# Patient Record
Sex: MALE | Race: BLACK OR AFRICAN AMERICAN | Employment: FULL TIME | URBAN - NONMETROPOLITAN AREA
[De-identification: names, ages, dates, MRNs, and addresses within clinical notes are randomized per-mention and may not be internally consistent; named-entity substitution may affect disease eponyms.]

---

## 2020-09-14 ENCOUNTER — HOSPITAL ENCOUNTER (OUTPATIENT)
Dept: CARDIAC REHAB | Age: 50
Discharge: HOME | End: 2020-09-14
Payer: COMMERCIAL

## 2020-09-14 ENCOUNTER — APPOINTMENT (OUTPATIENT)
Dept: CARDIAC REHAB | Age: 50
End: 2020-09-14

## 2020-09-14 VITALS — WEIGHT: 236.8 LBS

## 2020-09-14 PROCEDURE — 93798 PHYS/QHP OP CAR RHAB W/ECG: CPT

## 2020-09-16 ENCOUNTER — APPOINTMENT (OUTPATIENT)
Dept: CARDIAC REHAB | Age: 50
End: 2020-09-16

## 2020-09-18 ENCOUNTER — APPOINTMENT (OUTPATIENT)
Dept: CARDIAC REHAB | Age: 50
End: 2020-09-18
Payer: COMMERCIAL

## 2020-09-18 ENCOUNTER — APPOINTMENT (OUTPATIENT)
Dept: CARDIAC REHAB | Age: 50
End: 2020-09-18

## 2020-09-21 ENCOUNTER — APPOINTMENT (OUTPATIENT)
Dept: CARDIAC REHAB | Age: 50
End: 2020-09-21

## 2020-09-21 ENCOUNTER — HOSPITAL ENCOUNTER (OUTPATIENT)
Dept: CARDIAC REHAB | Age: 50
Discharge: HOME | End: 2020-09-21
Payer: COMMERCIAL

## 2020-09-21 VITALS — WEIGHT: 236.8 LBS

## 2020-09-21 PROCEDURE — 93798 PHYS/QHP OP CAR RHAB W/ECG: CPT

## 2020-09-23 ENCOUNTER — HOSPITAL ENCOUNTER (OUTPATIENT)
Dept: CARDIAC REHAB | Age: 50
Discharge: HOME | End: 2020-09-23
Payer: COMMERCIAL

## 2020-09-23 ENCOUNTER — APPOINTMENT (OUTPATIENT)
Dept: CARDIAC REHAB | Age: 50
End: 2020-09-23

## 2020-09-23 VITALS — WEIGHT: 236 LBS

## 2020-09-23 PROCEDURE — 93797 PHYS/QHP OP CAR RHAB WO ECG: CPT

## 2020-09-23 PROCEDURE — 93798 PHYS/QHP OP CAR RHAB W/ECG: CPT

## 2020-09-25 ENCOUNTER — APPOINTMENT (OUTPATIENT)
Dept: CARDIAC REHAB | Age: 50
End: 2020-09-25

## 2020-09-25 ENCOUNTER — HOSPITAL ENCOUNTER (OUTPATIENT)
Dept: CARDIAC REHAB | Age: 50
Discharge: HOME | End: 2020-09-25
Payer: COMMERCIAL

## 2020-09-25 VITALS — WEIGHT: 237.2 LBS

## 2020-09-25 PROCEDURE — 93798 PHYS/QHP OP CAR RHAB W/ECG: CPT

## 2020-09-25 NOTE — CARDIO/PULMONARY
Malik Prasad Completed phase II cardiac rehab and attended 36 sessions from 6/16/20- 9//25/2020. Malik Prasad is interested in maintaining optimal health and will work with Jose Luis Cagle MD. Malik Prasad has improved his endurance and stamina through regular exercise. Blood pressure is WNL. He/She has also improved his/her nutrition, [employer] and depression scores and these were reviewed with patient. Malik Prasad plans to continue exercising (at home, gym, etc) and has set revised goals that include walking 50 minutes twice daily. Christophe Manzanares RN 
9/25/2020

## 2020-09-28 ENCOUNTER — APPOINTMENT (OUTPATIENT)
Dept: CARDIAC REHAB | Age: 50
End: 2020-09-28
Payer: COMMERCIAL

## 2020-09-28 ENCOUNTER — APPOINTMENT (OUTPATIENT)
Dept: CARDIAC REHAB | Age: 50
End: 2020-09-28

## 2020-09-30 ENCOUNTER — APPOINTMENT (OUTPATIENT)
Dept: CARDIAC REHAB | Age: 50
End: 2020-09-30
Payer: COMMERCIAL

## 2020-09-30 ENCOUNTER — APPOINTMENT (OUTPATIENT)
Dept: CARDIAC REHAB | Age: 50
End: 2020-09-30

## 2020-10-02 ENCOUNTER — APPOINTMENT (OUTPATIENT)
Dept: CARDIAC REHAB | Age: 50
End: 2020-10-02

## 2020-10-05 ENCOUNTER — APPOINTMENT (OUTPATIENT)
Dept: CARDIAC REHAB | Age: 50
End: 2020-10-05

## 2020-10-07 ENCOUNTER — APPOINTMENT (OUTPATIENT)
Dept: CARDIAC REHAB | Age: 50
End: 2020-10-07

## 2020-10-09 ENCOUNTER — APPOINTMENT (OUTPATIENT)
Dept: CARDIAC REHAB | Age: 50
End: 2020-10-09

## 2020-10-12 ENCOUNTER — APPOINTMENT (OUTPATIENT)
Dept: CARDIAC REHAB | Age: 50
End: 2020-10-12

## 2020-10-14 ENCOUNTER — APPOINTMENT (OUTPATIENT)
Dept: CARDIAC REHAB | Age: 50
End: 2020-10-14

## 2020-10-16 ENCOUNTER — APPOINTMENT (OUTPATIENT)
Dept: CARDIAC REHAB | Age: 50
End: 2020-10-16

## 2020-10-19 ENCOUNTER — APPOINTMENT (OUTPATIENT)
Dept: CARDIAC REHAB | Age: 50
End: 2020-10-19

## 2020-10-21 ENCOUNTER — APPOINTMENT (OUTPATIENT)
Dept: CARDIAC REHAB | Age: 50
End: 2020-10-21

## 2020-10-23 ENCOUNTER — APPOINTMENT (OUTPATIENT)
Dept: CARDIAC REHAB | Age: 50
End: 2020-10-23

## 2020-10-26 ENCOUNTER — APPOINTMENT (OUTPATIENT)
Dept: CARDIAC REHAB | Age: 50
End: 2020-10-26

## 2020-10-28 ENCOUNTER — APPOINTMENT (OUTPATIENT)
Dept: CARDIAC REHAB | Age: 50
End: 2020-10-28

## 2020-10-30 ENCOUNTER — APPOINTMENT (OUTPATIENT)
Dept: CARDIAC REHAB | Age: 50
End: 2020-10-30

## 2020-11-04 ENCOUNTER — APPOINTMENT (OUTPATIENT)
Dept: CARDIAC REHAB | Age: 50
End: 2020-11-04

## 2020-11-11 ENCOUNTER — APPOINTMENT (OUTPATIENT)
Dept: CARDIAC REHAB | Age: 50
End: 2020-11-11

## 2021-03-16 PROBLEM — I10 HYPERTENSION: Status: ACTIVE | Noted: 2020-01-29

## 2021-03-16 PROBLEM — I50.9 CONGESTIVE HEART FAILURE (HCC): Status: ACTIVE | Noted: 2020-03-25

## 2021-03-16 PROBLEM — M48.00 SPINAL STENOSIS: Status: ACTIVE | Noted: 2020-01-29

## 2021-03-16 PROBLEM — N40.0 BENIGN PROSTATIC HYPERPLASIA: Status: ACTIVE | Noted: 2021-03-16

## 2021-03-16 PROBLEM — N41.9 PROSTATITIS: Status: ACTIVE | Noted: 2021-03-16

## 2021-03-16 PROBLEM — K46.9 ABDOMINAL HERNIA: Status: ACTIVE | Noted: 2020-01-29

## 2021-03-16 PROBLEM — I25.10 CORONARY ARTERIOSCLEROSIS: Status: ACTIVE | Noted: 2020-03-25

## 2021-03-16 PROBLEM — E66.9 OBESITY: Status: ACTIVE | Noted: 2021-03-16

## 2021-03-16 PROBLEM — I25.5 GENERALIZED ISCHEMIC MYOCARDIAL DYSFUNCTION: Status: ACTIVE | Noted: 2021-03-16

## 2021-03-16 PROBLEM — E11.9 DIABETES MELLITUS (HCC): Status: ACTIVE | Noted: 2021-03-16

## 2021-03-16 PROBLEM — Q61.3 CONGENITAL POLYCYSTIC KIDNEY: Status: ACTIVE | Noted: 2021-03-16

## 2021-03-16 PROBLEM — N28.9 KIDNEY DISORDER: Status: ACTIVE | Noted: 2020-01-29

## 2021-03-19 ENCOUNTER — OFFICE VISIT (OUTPATIENT)
Dept: PRIMARY CARE CLINIC | Age: 51
End: 2021-03-19
Payer: COMMERCIAL

## 2021-03-19 DIAGNOSIS — N40.0 BENIGN PROSTATIC HYPERPLASIA WITHOUT LOWER URINARY TRACT SYMPTOMS: ICD-10-CM

## 2021-03-19 DIAGNOSIS — I50.22 CHRONIC SYSTOLIC CONGESTIVE HEART FAILURE (HCC): Primary | ICD-10-CM

## 2021-03-19 DIAGNOSIS — I10 ESSENTIAL HYPERTENSION: ICD-10-CM

## 2021-03-19 DIAGNOSIS — R73.03 PREDIABETES: ICD-10-CM

## 2021-03-19 DIAGNOSIS — I25.10 CAD, MULTIPLE VESSEL: ICD-10-CM

## 2021-03-19 DIAGNOSIS — M48.00 SPINAL STENOSIS, UNSPECIFIED SPINAL REGION: ICD-10-CM

## 2021-03-19 DIAGNOSIS — N18.32 STAGE 3B CHRONIC KIDNEY DISEASE (HCC): ICD-10-CM

## 2021-03-19 DIAGNOSIS — E78.2 MIXED HYPERLIPIDEMIA: ICD-10-CM

## 2021-03-19 DIAGNOSIS — E66.09 CLASS 1 OBESITY DUE TO EXCESS CALORIES WITH SERIOUS COMORBIDITY AND BODY MASS INDEX (BMI) OF 31.0 TO 31.9 IN ADULT: ICD-10-CM

## 2021-03-19 PROCEDURE — 99204 OFFICE O/P NEW MOD 45 MIN: CPT | Performed by: NURSE PRACTITIONER

## 2021-03-19 RX ORDER — CARVEDILOL 25 MG/1
1 TABLET ORAL 2 TIMES DAILY
COMMUNITY
Start: 2021-03-09 | End: 2021-11-16 | Stop reason: ALTCHOICE

## 2021-03-19 RX ORDER — HYDRALAZINE HYDROCHLORIDE 10 MG/1
20 TABLET, FILM COATED ORAL 3 TIMES DAILY
COMMUNITY
Start: 2021-01-19 | End: 2022-05-04 | Stop reason: SDUPTHER

## 2021-03-19 RX ORDER — TAMSULOSIN HYDROCHLORIDE 0.4 MG/1
CAPSULE ORAL
COMMUNITY
Start: 2021-02-28

## 2021-03-19 RX ORDER — HYDROCODONE BITARTRATE AND ACETAMINOPHEN 5; 325 MG/1; MG/1
TABLET ORAL
COMMUNITY
Start: 2021-02-19

## 2021-03-19 RX ORDER — ISOSORBIDE MONONITRATE 30 MG/1
TABLET, EXTENDED RELEASE ORAL
COMMUNITY
Start: 2021-01-19

## 2021-03-19 RX ORDER — CARVEDILOL 12.5 MG/1
TABLET ORAL
COMMUNITY
Start: 2020-12-23 | End: 2021-03-19 | Stop reason: SDUPTHER

## 2021-03-19 RX ORDER — NITROGLYCERIN 0.4 MG/1
TABLET SUBLINGUAL
COMMUNITY
Start: 2021-01-10

## 2021-03-19 RX ORDER — ATORVASTATIN CALCIUM 20 MG/1
TABLET, FILM COATED ORAL
COMMUNITY
Start: 2021-02-12 | End: 2022-04-09

## 2021-03-19 RX ORDER — POTASSIUM CHLORIDE 750 MG/1
20 TABLET, FILM COATED, EXTENDED RELEASE ORAL
COMMUNITY
Start: 2021-02-13 | End: 2022-07-24 | Stop reason: ALTCHOICE

## 2021-03-19 RX ORDER — EZETIMIBE 10 MG/1
TABLET ORAL
COMMUNITY
Start: 2021-01-20

## 2021-03-19 RX ORDER — CARVEDILOL 12.5 MG/1
TABLET ORAL
Qty: 180 TAB | Refills: 0 | Status: SHIPPED | OUTPATIENT
Start: 2021-03-19 | End: 2021-12-16 | Stop reason: ALTCHOICE

## 2021-03-19 RX ORDER — PRASUGREL 10 MG/1
TABLET, FILM COATED ORAL
COMMUNITY
Start: 2021-01-16

## 2021-03-19 RX ORDER — ASPIRIN 81 MG/1
TABLET ORAL
COMMUNITY
Start: 2021-02-12

## 2021-03-19 RX ORDER — FUROSEMIDE 40 MG/1
80 TABLET ORAL DAILY
COMMUNITY
Start: 2021-01-08 | End: 2021-11-29 | Stop reason: ALTCHOICE

## 2021-03-19 RX ORDER — FUROSEMIDE 20 MG/1
TABLET ORAL
COMMUNITY
Start: 2021-01-08 | End: 2021-11-16

## 2021-03-19 NOTE — PROGRESS NOTES
1. Have you been to the ER, urgent care clinic since your last visit? Hospitalized since your last visit? No    2. Have you seen or consulted any other health care providers outside of the [address] [place] [address] since your last visit? Include any pap smears or colon screening.  No    Chief Complaint   Patient presents with    Establish Care     Needs PCP, history of heart attack, hypertension

## 2021-03-19 NOTE — PROGRESS NOTES
Tamiko Bruner is a 52 y.o. male who presents to the office today for the following:    Chief Complaint   Patient presents with   Ottoniel Loser Establish Care     Needs PCP, history of heart attack, hypertension    Hypertension    Cholesterol Problem    Pain (Chronic)       Past Medical History:   Diagnosis Date    Abdominal hernia 1/29/2020    Benign prostatic hyperplasia 3/16/2021    Congenital polycystic kidney 3/16/2021    Congestive heart failure ([facility] [address] 3/25/2020    Coronary arteriosclerosis 3/25/2020    Heart attack ([facility] [address] 3/18/2020 and 06/30/2020    History of COVID-19 3/28/2021    Hypertension     Prediabetes 3/28/2021    Spinal stenosis        Past Surgical History:   Procedure Laterality Date    HX HERNIA REPAIR      [place] CARDIAC SURG PROCEDURE UNLIST  06/30/2020    Stent placement        History reviewed. No pertinent family history. Social History     Tobacco Use    Smoking status: Never Smoker    Smokeless tobacco: Never Used   Substance Use Topics    Alcohol use: Not Currently    Drug use: Never        HPI  Patient here today to establish care with PMH of CAD w/ stents, hyperlipidemia, hypertension, CHF, BPH, prediabetes, CKD, covid 19,chronic back pain and obesity. Reports compliance with medications that are noted in chart. States that he is followed by pain specialist in [place] as well as nephrologist and cardiologist at [facility]. Has not had a primary care due to seeing multiple specialist. No specific concerns or complaints today. Current Outpatient Medications on File Prior to Visit   Medication Sig    aspirin delayed-release 81 mg tablet TAKE 1 TABLET BY MOUTH DAILY    atorvastatin (LIPITOR) 20 mg tablet TAKE 1 TABLET BY MOUTH DAILY    carvediloL (COREG) 25 mg tablet Take 1 Tab by mouth two (2) times a day.     ezetimibe (ZETIA) 10 mg tablet TAKE 1 TABLET BY MOUTH DAILY    furosemide (LASIX) 20 mg tablet TAKE 1 TABLET BY MOUTH DAILY    furosemide (LASIX) 40 mg tablet TAKE 2 TABLETS BY MOUTH TWICE DAILY    hydrALAZINE (APRESOLINE) 10 mg tablet TAKE 2 TABLETS BY MOUTH EVERY 8 HOURS    isosorbide mononitrate ER (IMDUR) 30 mg tablet TAKE 1 TABLET BY MOUTH DAILY    potassium chloride SR (KLOR-CON 10) 10 mEq tablet TAKE 2 TABLETS BY MOUTH TWICE DAILY    prasugreL (EFFIENT) 10 mg tablet TAKE 1 TABLET BY MOUTH DAILY    tamsulosin (FLOMAX) 0.4 mg capsule TAKE 1 CAPSULE BY MOUTH DAILY    HYDROcodone-acetaminophen (NORCO) 5-325 mg per tablet TAKE 1 TABLET BY MOUTH TWICE DAILY    nitroglycerin (NITROSTAT) 0.4 mg SL tablet ONE TABLET UNDER TONGUE AS NEEDED FOR CHEST PAIN     No current facility-administered medications on file prior to visit. Medications Ordered Today   Medications    carvediloL (COREG) 12.5 mg tablet     Sig: TAKE 1 TABLET BY MOUTH TWICE DAILY     Dispense:  180 Tab     Refill:  0        Review of Systems   Constitutional: Negative. Eyes: Negative. Respiratory: Positive for shortness of breath. Negative for cough, hemoptysis, sputum production and wheezing. Cardiovascular: Positive for leg swelling. Negative for chest pain, palpitations, claudication and PND. Gastrointestinal: Negative. Genitourinary: Negative. Musculoskeletal: Positive for back pain and myalgias. Negative for falls. Skin: Negative. Neurological: Negative. Psychiatric/Behavioral: Negative. Visit Vitals  /89 (BP 1 Location: Left upper arm, BP Patient Position: Sitting)   Pulse 94   Temp 97.7 °F (36.5 °C) (Temporal)   Resp 18   Ht 6' 3\" (1.905 m)   Wt 249 lb 2 oz (113 kg)   SpO2 97%   BMI 31.14 kg/m²       Physical Exam  Vitals signs and nursing note reviewed. Constitutional:       Appearance: Normal appearance. He is obese. Eyes:      Pupils: Pupils are equal, round, and reactive to light. Neck:      Vascular: No carotid bruit. Cardiovascular:      Rate and Rhythm: Normal rate and regular rhythm. Pulses: Normal pulses.    Pulmonary:      Effort: Pulmonary effort is normal.      Breath sounds: Normal breath sounds. Abdominal:      General: Bowel sounds are normal.      Palpations: Abdomen is soft. Tenderness: There is no abdominal tenderness. Musculoskeletal: Normal range of motion. Right lower leg: Edema (trace) present. Left lower leg: Edema (trace) present. Lymphadenopathy:      Cervical: No cervical adenopathy. Skin:     General: Skin is warm and dry. Neurological:      Mental Status: He is alert and oriented to person, place, and time. Mental status is at baseline. Psychiatric:         Mood and Affect: Mood normal.         Behavior: Behavior normal.            1. Chronic systolic congestive heart failure [facility])  Patient reports EF 35% in 12/2020  Follows with Dr. Penelope Franz at [facility] cardiology and will request records  - carvediloL (COREG) 12.5 mg tablet; TAKE 1 TABLET BY MOUTH TWICE DAILY  Dispense: 180 Tab; Refill: 0    2. CAD, multiple vessel  Patient has h/o MI and 3 stents placed in 6/2020 per patient   Follows with Dr. Michelle Yoder at [facility] cardiology and will request records    3. Essential hypertension  Blood pressure is controlled and continue medication as directed  Monitor at home and notify provider if > 140/90    4. Benign prostatic hyperplasia without lower urinary tract symptoms  Symptoms reported as stable    5. Prediabetes  Do not have any recent A1c and patient declines having this done today  Has controlled with diet     6. Stage 3b chronic kidney disease  Recent labs available per patient phone shows  CR 2.74 from 12/2020  Follows with Dr. David Gonzales at [facility] and will request last consult notes and labs    7. Spinal stenosis, unspecified spinal region  Symptoms reported as stable and is on chronic pain medication  Follows with pain specialist in [place]    8. Class 1 obesity due to excess calories with serious comorbidity and body mass index (BMI) of 31.0 to 31.9 in adult  Continue to encourage weight loss.  Recommend decreasing excess fat, salt and sugar in diet along with getting regular exercise 3-5 times weekly     9. Mixed hyperlipidemia  Last LDL per patient phone records was 78 in 12/2020 and continues on statin medication as directed            Patient verbalizes understanding of plan of care as discussed above    Follow-up and Dispositions    · Return in about 3 months (around 6/19/2021) for or sooner for worsening symptoms.

## 2021-03-28 VITALS
OXYGEN SATURATION: 97 % | TEMPERATURE: 97.7 F | DIASTOLIC BLOOD PRESSURE: 89 MMHG | SYSTOLIC BLOOD PRESSURE: 139 MMHG | RESPIRATION RATE: 18 BRPM | HEART RATE: 94 BPM | HEIGHT: 75 IN | WEIGHT: 249.13 LBS | BODY MASS INDEX: 30.98 KG/M2

## 2021-03-28 PROBLEM — N40.0 BENIGN PROSTATIC HYPERPLASIA: Status: RESOLVED | Noted: 2021-03-16 | Resolved: 2021-03-28

## 2021-03-28 PROBLEM — Z86.16 HISTORY OF COVID-19: Status: ACTIVE | Noted: 2021-03-28

## 2021-03-28 PROBLEM — R73.03 PREDIABETES: Status: ACTIVE | Noted: 2021-03-28

## 2021-03-28 PROBLEM — E11.9 DIABETES MELLITUS (HCC): Status: RESOLVED | Noted: 2021-03-16 | Resolved: 2021-03-28

## 2021-03-28 PROBLEM — Z86.16 HISTORY OF COVID-19: Status: RESOLVED | Noted: 2021-03-28 | Resolved: 2021-03-28

## 2021-09-21 ENCOUNTER — OFFICE VISIT (OUTPATIENT)
Dept: PRIMARY CARE CLINIC | Age: 51
End: 2021-09-21
Payer: COMMERCIAL

## 2021-09-21 VITALS
OXYGEN SATURATION: 98 % | BODY MASS INDEX: 30.7 KG/M2 | TEMPERATURE: 97.9 F | DIASTOLIC BLOOD PRESSURE: 83 MMHG | HEART RATE: 90 BPM | SYSTOLIC BLOOD PRESSURE: 129 MMHG | RESPIRATION RATE: 18 BRPM | WEIGHT: 245.6 LBS

## 2021-09-21 DIAGNOSIS — N48.1 BALANITIS: ICD-10-CM

## 2021-09-21 DIAGNOSIS — M48.00 SPINAL STENOSIS, UNSPECIFIED SPINAL REGION: ICD-10-CM

## 2021-09-21 DIAGNOSIS — E78.2 MIXED HYPERLIPIDEMIA: ICD-10-CM

## 2021-09-21 DIAGNOSIS — I10 ESSENTIAL HYPERTENSION: ICD-10-CM

## 2021-09-21 DIAGNOSIS — I50.22 CHRONIC SYSTOLIC CONGESTIVE HEART FAILURE (HCC): Primary | ICD-10-CM

## 2021-09-21 DIAGNOSIS — E66.09 CLASS 1 OBESITY DUE TO EXCESS CALORIES WITH SERIOUS COMORBIDITY AND BODY MASS INDEX (BMI) OF 31.0 TO 31.9 IN ADULT: ICD-10-CM

## 2021-09-21 DIAGNOSIS — R73.03 PREDIABETES: ICD-10-CM

## 2021-09-21 DIAGNOSIS — N18.32 STAGE 3B CHRONIC KIDNEY DISEASE (HCC): ICD-10-CM

## 2021-09-21 DIAGNOSIS — N40.0 BENIGN PROSTATIC HYPERPLASIA WITHOUT LOWER URINARY TRACT SYMPTOMS: ICD-10-CM

## 2021-09-21 DIAGNOSIS — K40.90 LEFT INGUINAL HERNIA: ICD-10-CM

## 2021-09-21 DIAGNOSIS — I25.10 CAD, MULTIPLE VESSEL: ICD-10-CM

## 2021-09-21 PROCEDURE — 99214 OFFICE O/P EST MOD 30 MIN: CPT | Performed by: NURSE PRACTITIONER

## 2021-09-21 RX ORDER — CHLORPHENIRAMINE MALEATE 4 MG
TABLET ORAL 2 TIMES DAILY
Qty: 30 G | Refills: 0 | Status: SHIPPED | OUTPATIENT
Start: 2021-09-21 | End: 2021-12-16 | Stop reason: ALTCHOICE

## 2021-09-21 RX ORDER — HYDROCORTISONE 1 %
CREAM (GRAM) TOPICAL 2 TIMES DAILY
Qty: 30 G | Refills: 0 | Status: SHIPPED | OUTPATIENT
Start: 2021-09-21 | End: 2021-12-16 | Stop reason: ALTCHOICE

## 2021-09-21 NOTE — PROGRESS NOTES
Colin Stewart is a 54 y.o. male who presents to the office today for the following:    Chief Complaint   Patient presents with    Hypertension       Past Medical History:   Diagnosis Date    Abdominal hernia 1/29/2020    Benign prostatic hyperplasia 3/16/2021    Congenital polycystic kidney 3/16/2021    Congestive heart failure ([facility] [address] 3/25/2020    Coronary arteriosclerosis 3/25/2020    Heart attack ([facility] [address] 3/18/2020 and 06/30/2020    History of COVID-19 3/28/2021    Hypertension     Prediabetes 3/28/2021    Spinal stenosis        Past Surgical History:   Procedure Laterality Date    HX HERNIA REPAIR      [place] CARDIAC SURG PROCEDURE UNLIST  06/30/2020    Stent placement        History reviewed. No pertinent family history. Social History     Tobacco Use    Smoking status: Never Smoker    Smokeless tobacco: Never Used   Vaping Use    Vaping Use: Never used   Substance Use Topics    Alcohol use: Not Currently    Drug use: Never      HPI  Patient here today to establish care with PMH of CAD w/ stents, hyperlipidemia, hypertension, CHF, BPH, prediabetes, CKD, covid 19,chronic back pain and obesity. Reports compliance with medications that are noted in chart. States that he is followed by pain specialist in [place] as well as nephrologist and cardiologist at [facility]. He reports that for the past several weeks has had white discharge and irritation to penis. States that he has had similar problem in the past but usually clears up on its own. Also wants to discuss having hernia repair done in left groin. Was supposed to have this done prior to have last stent placed but surgery had to be put on hold. Is uncomfortable at times but is reproducible.       Current Outpatient Medications on File Prior to Visit   Medication Sig    aspirin delayed-release 81 mg tablet TAKE 1 TABLET BY MOUTH DAILY    atorvastatin (LIPITOR) 20 mg tablet TAKE 1 TABLET BY MOUTH DAILY    carvediloL (COREG) 25 mg tablet Take 1 Tab by mouth two (2) times a day.  ezetimibe (ZETIA) 10 mg tablet TAKE 1 TABLET BY MOUTH DAILY    furosemide (LASIX) 20 mg tablet TAKE 1 TABLET BY MOUTH DAILY    furosemide (LASIX) 40 mg tablet TAKE 2 TABLETS BY MOUTH TWICE DAILY    hydrALAZINE (APRESOLINE) 10 mg tablet TAKE 2 TABLETS BY MOUTH EVERY 8 HOURS    isosorbide mononitrate ER (IMDUR) 30 mg tablet TAKE 1 TABLET BY MOUTH DAILY    potassium chloride SR (KLOR-CON 10) 10 mEq tablet TAKE 2 TABLETS BY MOUTH TWICE DAILY    prasugreL (EFFIENT) 10 mg tablet TAKE 1 TABLET BY MOUTH DAILY    tamsulosin (FLOMAX) 0.4 mg capsule TAKE 1 CAPSULE BY MOUTH DAILY    HYDROcodone-acetaminophen (NORCO) 5-325 mg per tablet Dr Bryson Lee nitroglycerin (NITROSTAT) 0.4 mg SL tablet ONE TABLET UNDER TONGUE AS NEEDED FOR CHEST PAIN    carvediloL (COREG) 12.5 mg tablet TAKE 1 TABLET BY MOUTH TWICE DAILY     No current facility-administered medications on file prior to visit. Medications Ordered Today   Medications    clotrimazole (LOTRIMIN) 1 % topical cream     Sig: Apply  to affected area two (2) times a day. Dispense:  30 g     Refill:  0    hydrocortisone (CORTAID) 1 % topical cream     Sig: Apply  to affected area two (2) times a day. use thin layer     Dispense:  30 g     Refill:  0        Review of Systems   Constitutional: Negative. Eyes: Negative. Respiratory: Positive for shortness of breath. Negative for cough, hemoptysis, sputum production and wheezing. Cardiovascular: Positive for leg swelling. Negative for chest pain, palpitations, claudication and PND. Gastrointestinal: Negative. Genitourinary: Negative. Negative for dysuria, frequency and urgency. Discharge and rash to penis   Musculoskeletal: Positive for back pain and myalgias. Negative for falls. Neurological: Negative. Psychiatric/Behavioral: Negative.         Visit Vitals  /83 (BP 1 Location: Left upper arm, BP Patient Position: Sitting, BP Cuff Size: Adult)   Pulse 90   Temp 97.9 °F (36.6 °C) (Temporal)   Resp 18   Wt 245 lb 9.6 oz (111.4 kg)   SpO2 98%   BMI 30.70 kg/m²       Physical Exam  Vitals and nursing note reviewed. Exam conducted with a chaperone present. Constitutional:       Appearance: Normal appearance. He is obese. Eyes:      Pupils: Pupils are equal, round, and reactive to light. Neck:      Vascular: No carotid bruit. Cardiovascular:      Rate and Rhythm: Normal rate and regular rhythm. Pulses: Normal pulses. Pulmonary:      Effort: Pulmonary effort is normal.      Breath sounds: Normal breath sounds. Abdominal:      General: Bowel sounds are normal.      Palpations: Abdomen is soft. Tenderness: There is no abdominal tenderness. Hernia: A hernia (left inguinal extending into scrotum) is present. Genitourinary:     Penis: Uncircumcised. Erythema (mild erythema) and discharge present. Comments: Foreskin retracts  Musculoskeletal:         General: Normal range of motion. Right lower leg: Edema (trace) present. Left lower leg: Edema (trace) present. Lymphadenopathy:      Cervical: No cervical adenopathy. Skin:     General: Skin is warm and dry. Neurological:      Mental Status: He is alert and oriented to person, place, and time. Mental status is at baseline. Psychiatric:         Mood and Affect: Mood normal.         Behavior: Behavior normal.            1. Chronic systolic congestive heart failure (HCC)   EF 25-35%   Follows with Dr. Sabi Sparks at [facility] cardiology and reviewed most recent note from 2/2021  Continue care per cardiologist and next appointment 10/28/21      2. CAD, multiple vessel  Patient has h/o MI and 3 stents with last placed in 6/2020  Follows with Dr. Russell Mota at [facility] cardiology and reviewed most recent note from 2/2021  Continue care per cardiologist and next appointment 10/28/21    3.  Essential hypertension  Blood pressure is controlled and continue medication as directed  Monitor at home and notify provider if > 140/90    4. Benign prostatic hyperplasia without lower urinary tract symptoms  Symptoms reported as stable    5. Prediabetes  Do not have any recent A1c but will check today  Has controlled with diet     6. Stage 3b chronic kidney disease  No recent labs available but will request   Follows with Dr. Daniela Leigh at [facility]     7. Spinal stenosis, unspecified spinal region  Symptoms reported as stable and is on chronic pain medication  Follows with pain specialist in [place]    8. Class 1 obesity due to excess calories with serious comorbidity and body mass index (BMI) of 31.0 to 31.9 in adult  Continue to encourage weight loss. Recommend decreasing excess fat, salt and sugar in diet along with getting regular exercise 3-5 times weekly     9. Mixed hyperlipidemia  Last LDL per patient phone records was 78 in 12/2020 and continues on statin medication as directed    10. Left inguinal hernia  This has been present and was supposed to have repair in 7/2020 but had stents placed prior to getting procedure and could not proceed  He does report intermittent discomfort but no change in size   Wants to seek possible intervention now but will need to discuss with cardiologist  Will get him set up to see Dr. Robert Carreon    11. Balanitis  Treat with topical lotrimin and hydrocortisone as directed  Re-evaluate in 1 week or sooner for any worsening symptoms  - URINALYSIS W/ RFLX MICROSCOPIC  - CULTURE, URINE  - clotrimazole (LOTRIMIN) 1 % topical cream; Apply  to affected area two (2) times a day. Dispense: 30 g; Refill: 0  - hydrocortisone (CORTAID) 1 % topical cream; Apply  to affected area two (2) times a day. use thin layer  Dispense: 30 g; Refill: 0      Patient verbalizes understanding of plan of care as discussed above    Follow-up and Dispositions    · Return in about 1 week (around 9/28/2021) for or sooner for worsening symptoms.

## 2021-09-21 NOTE — PROGRESS NOTES
Chief Complaint   Patient presents with    Hypertension     Pt did not bring meds but went over with pt and wife stated everything was correct

## 2021-09-23 LAB
APPEARANCE UR: CLEAR
BACTERIA #/AREA URNS HPF: NORMAL /[HPF]
BACTERIA UR CULT: NORMAL
BILIRUB UR QL STRIP: NEGATIVE
CASTS URNS QL MICRO: NORMAL /LPF
COLOR UR: YELLOW
EPI CELLS #/AREA URNS HPF: NORMAL /HPF (ref 0–10)
GLUCOSE UR QL: ABNORMAL
HGB UR QL STRIP: ABNORMAL
KETONES UR QL STRIP: NEGATIVE
LEUKOCYTE ESTERASE UR QL STRIP: NEGATIVE
MICRO URNS: ABNORMAL
NITRITE UR QL STRIP: NEGATIVE
PH UR STRIP: 6.5 [PH] (ref 5–7.5)
PROT UR QL STRIP: ABNORMAL
RBC #/AREA URNS HPF: NORMAL /HPF (ref 0–2)
SP GR UR: 1.01 (ref 1–1.03)
UROBILINOGEN UR STRIP-MCNC: 0.2 MG/DL (ref 0.2–1)
WBC #/AREA URNS HPF: NORMAL /HPF (ref 0–5)

## 2021-09-30 ENCOUNTER — OFFICE VISIT (OUTPATIENT)
Dept: SURGERY | Age: 51
End: 2021-09-30
Payer: COMMERCIAL

## 2021-09-30 VITALS
WEIGHT: 245 LBS | OXYGEN SATURATION: 98 % | TEMPERATURE: 98 F | BODY MASS INDEX: 30.46 KG/M2 | DIASTOLIC BLOOD PRESSURE: 90 MMHG | RESPIRATION RATE: 14 BRPM | SYSTOLIC BLOOD PRESSURE: 140 MMHG | HEIGHT: 75 IN | HEART RATE: 85 BPM

## 2021-09-30 DIAGNOSIS — K40.90 NON-RECURRENT UNILATERAL INGUINAL HERNIA WITHOUT OBSTRUCTION OR GANGRENE: Primary | ICD-10-CM

## 2021-09-30 PROCEDURE — 99204 OFFICE O/P NEW MOD 45 MIN: CPT | Performed by: COLON & RECTAL SURGERY

## 2021-09-30 NOTE — PROGRESS NOTES
OFFICE VISIT NOTE    Kian Morales is a 54 y.o. male who presents to the office today for:    Chief Complaint   Patient presents with    New Patient    Inguinal Hernia     L inguinal hernia       Ms. Ruben Romero is a very pleasant 14-year-old -American gentleman who was referred for evaluation and surgical management of a left inguinal hernia. He states that the hernia has been present for quite some time and is actually scheduled to have surgery July 2020 however he had an MI and required a cardiac catheterization with stent placement June 2020. He does have a fairly extensive cardiac history. He has a history of hypertension, ischemic myocardial dysfunction, congestive heart failure. He is followed by Dr. Ángel Gonzalez at [facility]. In terms of his hernia he reports discomfort at the site denies any obstructive symptoms. He states he is able to reduce the hernia manually but does not spontaneously reduce. His health history is otherwise significant for history of prostatitis, BPH, congenital polycystic kidney, obesity.       Past Medical History:   Diagnosis Date    Abdominal hernia 1/29/2020    Benign prostatic hyperplasia 3/16/2021    Congenital polycystic kidney 3/16/2021    Congestive heart failure ([address] 3/25/2020    Coronary arteriosclerosis 3/25/2020    Heart attack ([facility] [address] 3/18/2020 and 06/30/2020    History of COVID-19 3/28/2021    Hypertension     Inguinal hernia     Prediabetes 3/28/2021    Spinal stenosis        Past Surgical History:   Procedure Laterality Date    HX HERNIA REPAIR  [address]    um[address] hernia    [place] CARDIAC SURG PROCEDURE UNLIST  06/30/2020    Stent placement       Family History   Problem Relation Age of Onset    Hypertension Mother     Diabetes Mother     Kidney Disease Mother     Heart Disease Father        Social History Socioeconomic History    Marital status:      Spouse name: Not on file    Number of children: Not on file    Years of education: Not on file    Highest education level: Not on file   Occupational History    Not on file   Tobacco Use    Smoking status: Never Smoker    Smokeless tobacco: Never Used   Vaping Use    Vaping Use: Never used   Substance and Sexual Activity    Alcohol use: Not Currently    Drug use: Never    Sexual activity: Yes     Partners: Female   Other Topics Concern    Not on file   Social History Narrative    Not on file     Social Determinants of Health     Financial Resource Strain:     Difficulty of Paying Living Expenses:    Food Insecurity:     Worried About Running Out of Food in the Last Year:     [address] in the Last Year:    Transportation Needs:     Lack of Transportation (Medical):  Lack of Transportation (Non-Medical):    Physical Activity:     Days of Exercise per Week:     Minutes of Exercise per Session:    Stress:     Feeling of Stress :    Social Connections:     Frequency of Communication with Friends and Family:     Frequency of Social Gatherings with Friends and Family:     Attends Taoist Services:     Active Member of Clubs or Organizations:     Attends Club or Organization Meetings:     Marital Status:    Intimate Partner Violence:     Fear of Current or Ex-Partner:     Emotionally Abused:     Physically Abused:     Sexually Abused: Allergies   Allergen Reactions    Adhesive Tape-Silicones Rash     Paper tape causes  large blisters       Current Outpatient Medications   Medication Sig    clotrimazole (LOTRIMIN) 1 % topical cream Apply  to affected area two (2) times a day.  hydrocortisone (CORTAID) 1 % topical cream Apply  to affected area two (2) times a day.  use thin layer    aspirin delayed-release 81 mg tablet TAKE 1 TABLET BY MOUTH DAILY    atorvastatin (LIPITOR) 20 mg tablet TAKE 1 TABLET BY MOUTH DAILY  carvediloL (COREG) 25 mg tablet Take 1 Tab by mouth two (2) times a day.  ezetimibe (ZETIA) 10 mg tablet TAKE 1 TABLET BY MOUTH DAILY    furosemide (LASIX) 20 mg tablet TAKE 1 TABLET BY MOUTH DAILY    furosemide (LASIX) 40 mg tablet TAKE 2 TABLETS BY MOUTH TWICE DAILY    hydrALAZINE (APRESOLINE) 10 mg tablet TAKE 2 TABLETS BY MOUTH EVERY 8 HOURS    isosorbide mononitrate ER (IMDUR) 30 mg tablet TAKE 1 TABLET BY MOUTH DAILY    potassium chloride SR (KLOR-CON 10) 10 mEq tablet TAKE 2 TABLETS BY MOUTH TWICE DAILY    prasugreL (EFFIENT) 10 mg tablet TAKE 1 TABLET BY MOUTH DAILY    tamsulosin (FLOMAX) 0.4 mg capsule TAKE 1 CAPSULE BY MOUTH DAILY    HYDROcodone-acetaminophen (NORCO) 5-325 mg per tablet Dr Rissa Natarajan nitroglycerin (NITROSTAT) 0.4 mg SL tablet ONE TABLET UNDER TONGUE AS NEEDED FOR CHEST PAIN    carvediloL (COREG) 12.5 mg tablet TAKE 1 TABLET BY MOUTH TWICE DAILY     No current facility-administered medications for this visit. Review of Systems   All other systems reviewed and are negative. BP (!) 140/90 (BP 1 Location: Left upper arm, BP Patient Position: Sitting, BP Cuff Size: Adult)   Pulse 85   Temp 98 °F (36.7 °C) (Temporal)   Resp 14   Ht 6' 3\" (1.905 m)   Wt 245 lb (111.1 kg)   SpO2 98% Comment: room air  BMI 30.62 kg/m²     Physical Exam  Constitutional:       General: He is not in acute distress. Appearance: Normal appearance. He is obese. He is not ill-appearing. HENT:      Head: Normocephalic and atraumatic. Cardiovascular:      Rate and Rhythm: Normal rate and regular rhythm. Pulmonary:      Effort: Pulmonary effort is normal.      Breath sounds: Normal breath sounds. Abdominal:      General: There is no distension. Palpations: Abdomen is soft. Tenderness: There is no abdominal tenderness. Hernia: A hernia (Large left inguinal hernia reducible) is present. Musculoskeletal:      Cervical back: Normal range of motion.    Skin:     General: Skin is dry. Neurological:      General: No focal deficit present. Mental Status: He is alert and oriented to person, place, and time. Psychiatric:         Mood and Affect: Mood normal.         Thought Content: Thought content normal.         Problem List Items Addressed This Visit        Other    Inguinal hernia - Primary          Assessment and Plan:  I told the patient given the size of the hernia and the fact that is symptomatic and causing discomfort that is reasonable to proceed with repair. He will have have to get clearance from his cardiologist.  He is scheduled to see Dr. Obi Mohr on October 28, 2021. Hopefully he will clear him for surgery. He will need to stop his anticoagulation, will be able to continue on 81 mg aspirin. He will contact my office once he has received clearance. The procedure at length with him and reviewed the risk and benefits of and the risk of infection, bleeding, wound complications, mesh complications, recurrence which I told him is higher in his case because he does smoke, injury to cord structures, testicular loss and or atrophy. Also told him that it would be beneficial if he can stop smoking prior to surgery.             Melissa Colorado MD

## 2021-10-08 DIAGNOSIS — E87.6 HYPOKALEMIA: Primary | ICD-10-CM

## 2021-10-09 DIAGNOSIS — E11.65 TYPE 2 DIABETES MELLITUS WITH HYPERGLYCEMIA, WITHOUT LONG-TERM CURRENT USE OF INSULIN (HCC): Primary | ICD-10-CM

## 2021-10-09 LAB
BUN SERPL-MCNC: 25 MG/DL (ref 6–24)
BUN/CREAT SERPL: 9 (ref 9–20)
CALCIUM SERPL-MCNC: 9.4 MG/DL (ref 8.7–10.2)
CHLORIDE SERPL-SCNC: 88 MMOL/L (ref 96–106)
CO2 SERPL-SCNC: 25 MMOL/L (ref 20–29)
CREAT SERPL-MCNC: 2.69 MG/DL (ref 0.76–1.27)
GLUCOSE SERPL-MCNC: 796 MG/DL (ref 65–99)
POTASSIUM SERPL-SCNC: 4.6 MMOL/L (ref 3.5–5.2)
SODIUM SERPL-SCNC: 128 MMOL/L (ref 134–144)

## 2021-10-09 NOTE — PROGRESS NOTES
Received call from [employer] this morning with a panic value for his glucose of 796. He is not on any diabetic medications and does not have a specific symptom for this although he has CKD and may not notice polyuria. Will need Insulin short term and likely long term as he has the CKD. Discussed this with the patient and the charge nurse at [facility] Department. He agrees to go to the ER for further treatment. Discussed the high risk of life threatening [place] if he does not go to the ER and he voices understanding. I reviewed the history as we know it as well as all the lab values on the Chemistry panel with the charge nurse.

## 2021-10-11 ENCOUNTER — TELEPHONE (OUTPATIENT)
Dept: PRIMARY CARE CLINIC | Age: 51
End: 2021-10-11

## 2021-10-11 NOTE — PROGRESS NOTES
Dr. Krystal Romano has already contacted patient and referred to emergency department. Please follow up with him to see if home and would like to see him for appointment.

## 2021-10-11 NOTE — TELEPHONE ENCOUNTER
Spoke with pt wife and the pt never went to the ED as recommended, the wife is going to make an appt with you, ad she stated he has anppt at the end of this month with his kidney doc and cardio

## 2021-10-11 NOTE — TELEPHONE ENCOUNTER
Inform her that his sugar is in a life threatening range and he could die from this. I explained this to him. He cannot wait until he sees his kidney or heart doctor. If [facility] has an opening tomorrow or Wednesday that it the only option other then going to the ER. We will not be responsible if he dies before then. Make sure you let them know this. I will be retiring and will be unavailable to take care of him down the road.  He could come by for a POC glucose to see if it is any lower then 800

## 2021-10-29 ENCOUNTER — TELEPHONE (OUTPATIENT)
Dept: PRIMARY CARE CLINIC | Age: 51
End: 2021-10-29

## 2021-10-29 NOTE — TELEPHONE ENCOUNTER
Patient has his labs done with specialist and I have not been managing or following for anemia. In past, Dr. Stanislaw Marquez prefers this to be done with hematologist as there is risk of reactions that can occur with this type infusion. I feel they could probably set up in [facility] but to my knowledge any provider can send orders for this to [facility] outpatient.

## 2021-10-29 NOTE — TELEPHONE ENCOUNTER
Ralph Latif called (LVM) from the [facility] heart failure clinic and asked about you helping them get the pt iron infusions, they tried to do it with [facility] but was told the orders had to come from the PCP they would not accept their orders.  I called her back an dLVM for her to call me back to get more details

## 2021-11-07 ENCOUNTER — HOSPITAL ENCOUNTER (EMERGENCY)
Age: 51
Discharge: ANOTHER HOSPITAL | End: 2021-11-08
Attending: EMERGENCY MEDICINE
Payer: COMMERCIAL

## 2021-11-07 ENCOUNTER — APPOINTMENT (OUTPATIENT)
Dept: GENERAL RADIOLOGY | Age: 51
End: 2021-11-07
Attending: EMERGENCY MEDICINE
Payer: COMMERCIAL

## 2021-11-07 DIAGNOSIS — I24.9 ACS (ACUTE CORONARY SYNDROME) (HCC): Primary | ICD-10-CM

## 2021-11-07 LAB
ALBUMIN SERPL-MCNC: 2.6 G/DL (ref 3.5–5)
ALBUMIN/GLOB SERPL: 0.7 {RATIO} (ref 1.1–2.2)
ALP SERPL-CCNC: 74 U/L (ref 45–117)
ALT SERPL-CCNC: 23 U/L (ref 12–78)
ANION GAP SERPL CALC-SCNC: 7 MMOL/L (ref 5–15)
AST SERPL W P-5'-P-CCNC: 17 U/L (ref 15–37)
BASOPHILS # BLD: 0 K/UL (ref 0–0.2)
BASOPHILS NFR BLD: 0 % (ref 0–2.5)
BILIRUB SERPL-MCNC: 0.3 MG/DL (ref 0.2–1)
BNP SERPL-MCNC: 4538 PG/ML
BUN SERPL-MCNC: 29 MG/DL (ref 6–20)
BUN/CREAT SERPL: 7 (ref 12–20)
CA-I BLD-MCNC: 8.1 MG/DL (ref 8.5–10.1)
CHLORIDE SERPL-SCNC: 99 MMOL/L (ref 97–108)
CO2 SERPL-SCNC: 28 MMOL/L (ref 21–32)
CREAT SERPL-MCNC: 4.02 MG/DL (ref 0.7–1.3)
EOSINOPHIL # BLD: 0.2 K/UL (ref 0–0.7)
EOSINOPHIL NFR BLD: 2 % (ref 0.9–2.9)
ERYTHROCYTE [DISTWIDTH] IN BLOOD BY AUTOMATED COUNT: 14.2 % (ref 11.5–14.5)
GLOBULIN SER CALC-MCNC: 3.5 G/DL (ref 2–4)
GLUCOSE BLD STRIP.AUTO-MCNC: 504 MG/DL (ref 65–117)
GLUCOSE BLD STRIP.AUTO-MCNC: 519 MG/DL (ref 65–117)
GLUCOSE SERPL-MCNC: 522 MG/DL (ref 65–100)
HCT VFR BLD AUTO: 37.2 % (ref 41–53)
HGB BLD-MCNC: 12.2 G/DL (ref 13.5–17.5)
INR PPP: 1.1 (ref 0.9–1.1)
LYMPHOCYTES # BLD: 1.1 K/UL (ref 1–4.8)
LYMPHOCYTES NFR BLD: 11 % (ref 20.5–51.1)
MCH RBC QN AUTO: 26.3 PG (ref 31–34)
MCHC RBC AUTO-ENTMCNC: 33 G/DL (ref 31–36)
MCV RBC AUTO: 80 FL (ref 80–100)
MONOCYTES # BLD: 0.5 K/UL (ref 0.2–2.4)
MONOCYTES NFR BLD: 6 % (ref 1.7–9.3)
NEUTS SEG # BLD: 8.2 K/UL (ref 1.8–7.7)
NEUTS SEG NFR BLD: 81 % (ref 42–75)
NRBC # BLD: 0.01 K/UL
NRBC BLD-RTO: 0.1 PER 100 WBC
PERFORMED BY, TECHID: ABNORMAL
PERFORMED BY, TECHID: ABNORMAL
PLATELET # BLD AUTO: 175 K/UL (ref 150–400)
PMV BLD AUTO: 9 FL (ref 6.5–11.5)
POTASSIUM SERPL-SCNC: 4.3 MMOL/L (ref 3.5–5.1)
PROT SERPL-MCNC: 6.1 G/DL (ref 6.4–8.2)
PROTHROMBIN TIME: 10.4 SEC (ref 9–11.1)
RBC # BLD AUTO: 4.7 M/UL (ref 4.5–5.9)
SODIUM SERPL-SCNC: 134 MMOL/L (ref 136–145)
TROPONIN-HIGH SENSITIVITY: 319 NG/L (ref 0–76)
TROPONIN-HIGH SENSITIVITY: 38 NG/L (ref 0–76)
WBC # BLD AUTO: 10.1 K/UL (ref 4.4–11.3)

## 2021-11-07 PROCEDURE — 85610 PROTHROMBIN TIME: CPT

## 2021-11-07 PROCEDURE — 96375 TX/PRO/DX INJ NEW DRUG ADDON: CPT

## 2021-11-07 PROCEDURE — 96374 THER/PROPH/DIAG INJ IV PUSH: CPT

## 2021-11-07 PROCEDURE — 74011000250 HC RX REV CODE- 250: Performed by: EMERGENCY MEDICINE

## 2021-11-07 PROCEDURE — 82962 GLUCOSE BLOOD TEST: CPT

## 2021-11-07 PROCEDURE — 84484 ASSAY OF TROPONIN QUANT: CPT

## 2021-11-07 PROCEDURE — 93005 ELECTROCARDIOGRAM TRACING: CPT

## 2021-11-07 PROCEDURE — 71045 X-RAY EXAM CHEST 1 VIEW: CPT

## 2021-11-07 PROCEDURE — 85730 THROMBOPLASTIN TIME PARTIAL: CPT

## 2021-11-07 PROCEDURE — 85025 COMPLETE CBC W/AUTO DIFF WBC: CPT

## 2021-11-07 PROCEDURE — 36415 COLL VENOUS BLD VENIPUNCTURE: CPT

## 2021-11-07 PROCEDURE — 83880 ASSAY OF NATRIURETIC PEPTIDE: CPT

## 2021-11-07 PROCEDURE — 99285 EMERGENCY DEPT VISIT HI MDM: CPT

## 2021-11-07 PROCEDURE — 74011636637 HC RX REV CODE- 636/637: Performed by: EMERGENCY MEDICINE

## 2021-11-07 PROCEDURE — 74011250636 HC RX REV CODE- 250/636: Performed by: EMERGENCY MEDICINE

## 2021-11-07 PROCEDURE — 80053 COMPREHEN METABOLIC PANEL: CPT

## 2021-11-07 RX ORDER — NITROGLYCERIN 20 MG/100ML
0-20 INJECTION INTRAVENOUS
Status: DISCONTINUED | OUTPATIENT
Start: 2021-11-07 | End: 2021-11-08 | Stop reason: HOSPADM

## 2021-11-07 RX ORDER — ONDANSETRON 2 MG/ML
4 INJECTION INTRAMUSCULAR; INTRAVENOUS ONCE
Status: COMPLETED | OUTPATIENT
Start: 2021-11-07 | End: 2021-11-07

## 2021-11-07 RX ORDER — MORPHINE SULFATE 4 MG/ML
4 INJECTION INTRAVENOUS ONCE
Status: COMPLETED | OUTPATIENT
Start: 2021-11-07 | End: 2021-11-07

## 2021-11-07 RX ADMIN — INSULIN HUMAN 14 UNITS: 100 INJECTION, SOLUTION PARENTERAL at 22:38

## 2021-11-07 RX ADMIN — NITROGLYCERIN 15 MCG/MIN: 20 INJECTION INTRAVENOUS at 21:41

## 2021-11-07 RX ADMIN — ONDANSETRON 4 MG: 2 INJECTION INTRAMUSCULAR; INTRAVENOUS at 21:41

## 2021-11-07 RX ADMIN — MORPHINE SULFATE 4 MG: 4 INJECTION, SOLUTION INTRAMUSCULAR; INTRAVENOUS at 21:32

## 2021-11-08 VITALS
HEART RATE: 78 BPM | HEIGHT: 75 IN | WEIGHT: 240 LBS | BODY MASS INDEX: 29.84 KG/M2 | OXYGEN SATURATION: 96 % | SYSTOLIC BLOOD PRESSURE: 122 MMHG | TEMPERATURE: 98.2 F | DIASTOLIC BLOOD PRESSURE: 85 MMHG | RESPIRATION RATE: 18 BRPM

## 2021-11-08 LAB
APTT PPP: 22.3 SEC (ref 22.1–31)
THERAPEUTIC RANGE,PTTT: NORMAL SEC (ref 82–109)

## 2021-11-08 PROCEDURE — 93005 ELECTROCARDIOGRAM TRACING: CPT

## 2021-11-08 PROCEDURE — 96376 TX/PRO/DX INJ SAME DRUG ADON: CPT

## 2021-11-08 PROCEDURE — 96375 TX/PRO/DX INJ NEW DRUG ADDON: CPT

## 2021-11-08 PROCEDURE — 74011250636 HC RX REV CODE- 250/636: Performed by: EMERGENCY MEDICINE

## 2021-11-08 RX ORDER — HEPARIN SODIUM 10000 [USP'U]/100ML
12-25 INJECTION, SOLUTION INTRAVENOUS
Status: DISCONTINUED | OUTPATIENT
Start: 2021-11-08 | End: 2021-11-08 | Stop reason: HOSPADM

## 2021-11-08 RX ORDER — HEPARIN SODIUM 1000 [USP'U]/ML
4000 INJECTION, SOLUTION INTRAVENOUS; SUBCUTANEOUS ONCE
Status: COMPLETED | OUTPATIENT
Start: 2021-11-08 | End: 2021-11-08

## 2021-11-08 RX ORDER — HEPARIN SODIUM 1000 [USP'U]/ML
4000 INJECTION, SOLUTION INTRAVENOUS; SUBCUTANEOUS AS NEEDED
Status: DISCONTINUED | OUTPATIENT
Start: 2021-11-08 | End: 2021-11-08 | Stop reason: HOSPADM

## 2021-11-08 RX ORDER — HEPARIN SODIUM 10000 [USP'U]/100ML
12-25 INJECTION, SOLUTION INTRAVENOUS
Status: DISCONTINUED | OUTPATIENT
Start: 2021-11-08 | End: 2021-11-08

## 2021-11-08 RX ORDER — HEPARIN SODIUM 1000 [USP'U]/ML
2000 INJECTION, SOLUTION INTRAVENOUS; SUBCUTANEOUS AS NEEDED
Status: DISCONTINUED | OUTPATIENT
Start: 2021-11-08 | End: 2021-11-08 | Stop reason: HOSPADM

## 2021-11-08 RX ADMIN — HEPARIN SODIUM 12 UNITS/KG/HR: 10000 INJECTION, SOLUTION INTRAVENOUS at 01:00

## 2021-11-08 RX ADMIN — HEPARIN SODIUM 4000 UNITS: 1000 INJECTION INTRAVENOUS; SUBCUTANEOUS at 00:58

## 2021-11-08 NOTE — ED TRIAGE NOTES
Patient presents to ED via EMS reporting substernal chest pain that started a couple hours PTA. EMS administered 324mg of ASA and 0.4mg sublingual nitro tabs x7 with minimal relief. Patient denies shortness of breath, cough, and fever.

## 2021-11-08 NOTE — ED PROVIDER NOTES
EMERGENCY DEPARTMENT HISTORY AND PHYSICAL EXAM  ?    Date: 11/7/2021  Patient Name: Mckayla Thomas    History of Presenting Illness    Patient presents with:  Chest Pain      History Provided By: Patient    HPI: Mckayla Thomas, 54 y.o. male with a past medical history significant diabetes, hypertension, hyperlipidemia and myocardial infarction presents to the ED with cc of substernal chest pressure that started 1-1/2 hours ago. Pain was rated 10 out of 10 radiating to the left shoulder. That improved to 6 out of 10 with several and sublingual nitroglycerin. There are no other complaints, changes, or physical findings at this time. PCP: Lauro Lesch, NP    Current Facility-Administered Medications:  morphine injection 4 mg, 4 mg, IntraVENous, ONCE, Mike Necessary, MD  ondansetron [facility]) injection 4 mg, 4 mg, IntraVENous, ONCE, Mike Necessary, MD  nitroglycerin (Tridil) 200 mcg/ml infusion, 0-20 mcg/min, IntraVENous, TITRATE, Mike Necessary, MD    Current Outpatient Medications:  clotrimazole (LOTRIMIN) 1 % topical cream, Apply  to affected area two (2) times a day., Disp: 30 g, Rfl: 0  hydrocortisone (CORTAID) 1 % topical cream, Apply  to affected area two (2) times a day.  use thin layer, Disp: 30 g, Rfl: 0  aspirin delayed-release 81 mg tablet, TAKE 1 TABLET BY MOUTH DAILY, Disp: , Rfl:   atorvastatin (LIPITOR) 20 mg tablet, TAKE 1 TABLET BY MOUTH DAILY, Disp: , Rfl:   carvediloL (COREG) 25 mg tablet, Take 1 Tab by mouth two (2) times a day., Disp: , Rfl:   ezetimibe (ZETIA) 10 mg tablet, TAKE 1 TABLET BY MOUTH DAILY, Disp: , Rfl:   furosemide (LASIX) 20 mg tablet, TAKE 1 TABLET BY MOUTH DAILY, Disp: , Rfl:   furosemide (LASIX) 40 mg tablet, TAKE 2 TABLETS BY MOUTH TWICE DAILY, Disp: , Rfl:   hydrALAZINE (APRESOLINE) 10 mg tablet, TAKE 2 TABLETS BY MOUTH EVERY 8 HOURS, Disp: , Rfl:   isosorbide mononitrate ER (IMDUR) 30 mg tablet, TAKE 1 TABLET BY MOUTH DAILY, Disp: , Rfl:   potassium chloride SR (KLOR-CON 10) 10 mEq tablet, TAKE 2 TABLETS BY MOUTH TWICE DAILY, Disp: , Rfl:   prasugreL (EFFIENT) 10 mg tablet, TAKE 1 TABLET BY MOUTH DAILY, Disp: , Rfl:   tamsulosin (FLOMAX) 0.4 mg capsule, TAKE 1 CAPSULE BY MOUTH DAILY, Disp: , Rfl:   HYDROcodone-acetaminophen (NORCO) 5-325 mg per tablet, Dr Fadi Valiente, Disp: , Rfl:   nitroglycerin (NITROSTAT) 0.4 mg SL tablet, ONE TABLET UNDER TONGUE AS NEEDED FOR CHEST PAIN, Disp: , Rfl:   carvediloL (COREG) 12.5 mg tablet, TAKE 1 TABLET BY MOUTH TWICE DAILY, Disp: 180 Tab, Rfl: 0        Past History    Past Medical History:  Past Medical History:  1/29/2020:  Abdominal hernia  3/16/2021: Benign prostatic hyperplasia  3/16/2021: Congenital polycystic kidney  3/25/2020: Congestive heart failure ([facility] [address]  3/25/2020: Coronary arteriosclerosis  3/18/2020 and 06/30/2020: Heart attack ([facility] [address]  3/28/2021: History of COVID-19  No date: Hypertension  No date: Inguinal hernia  3/28/2021: Prediabetes  No date: Spinal stenosis    Past Surgical History:  Past Surgical History:  1999: HX HERNIA REPAIR     Comment:  umbiblical hernia  95/01/7873: [place] CARDIAC SURG PROCEDURE UNLIST     Comment:  Stent placement    Family History:  Review of patient's family history indicates:  Problem: Hypertension     Relation: Mother         Age of Onset: (Not Specified)  Problem: Diabetes     Relation: Mother         Age of Onset: (Not Specified)  Problem: Kidney Disease     Relation: Mother         Age of Onset: (Not Specified)  Problem: Heart Disease     Relation: Father         Age of Onset: (Not Specified)      Social History:  Social History   Tobacco Use     Smoking status: Never Smoker     Smokeless tobacco: Never Used   Vaping Use     Vaping Use: Never used   Alcohol use: Not Currently   Drug use: Never      Allergies:  -- Adhesive Tape-Silicones -- Rash   --  Paper tape causes  large blisters      Review of Systems  [unfilled]    Physical Exam  [unfilled]    Diagnostic Study Results    Labs -  Recent Results (from the past 15 hour(s))  -EKG, 12 LEAD, INITIAL:   Collection Time: 11/07/21  9:56 PM      Result                      Value             Ref Range          Ventricular Rate            83                BPM                Atrial Rate                 83                BPM                P-R Interval                191               ms                 QRS Duration                121               ms                 Q-T Interval                394               ms                 QTC Calculation (Bezet)     463               ms                 Calculated P Axis           39                degrees            Calculated R Axis           14                degrees            Calculated T Axis           121               degrees            Diagnosis                                                    Sinus rhythm Probable left atrial enlargement Nonspecific intraventricular conduction delay Probable anterior infarct, age indeterminate Baseline wander in lead(s) III     Radiologic Studies -   XR CHEST PORT  Final Result   No evidence of an acute cardiopulmonary process. CT Results  (Last 48 hours)   None     CXR Results  (Last 48 hours)             11/07/21 2116  XR CHEST PORT Final result   Impression:  No evidence of an acute cardiopulmonary process. Narrative:  XR CHEST PORT      Comparison:  6/16/2015. Single view: The lungs are clear. No pneumothorax or pleural effusion   apparent. The cardiomediastinum is unremarkable. There is no evidence of acute   cardiac   decompensation. Medical Decision Making and ED Course  I am the first provider for this patient. I reviewed the vital signs, available nursing notes, past medical history, past surgical history, family history and social history. Vital Signs-Reviewed the patient's vital signs. Empty flowsheet group. EKG interpretation: (Preliminary)  Rhythm: normal sinus rhythm; and regular . Rate (approx.): 100;  Axis: normal; PA interval: normal; QRS interval: normal ; ST/T wave: elevated and V1 V2 V3; Other findings: IVCD, ST elevation anteriorly. Records Reviewed: Nursing Notes, Old Medical Records and Previous electrocardiograms    Provider Notes (Medical Decision Making):   Rule out ACS    The patient presents with differential diagnosis of ACS, chest wall pain, GERD. ED Course:   First EKG image was sent to Dr. Edenilson Martinez for review. His interpretation was - no STEMI, IVCD. Physical exam shows reproducible tenderness at the left lower sternal border which patient says is the pain he is having. Pain is not improving with IV nitroglycerin. Repeat troponin is markedly elevated. Patient agrees to transfer to [facility] for further evaluation. Initial assessment performed. The patients presenting problems have been discussed, and they are in agreement with the care plan formulated and outlined with them. I have encouraged them to ask questions as they arise throughout their visit. CRITICAL CARE NOTE :  9:34 PM  Amount of Critical Care Time: __45__(minutes)__    IMPENDING DETERIORATION -Cardiovascular  ASSOCIATED RISK FACTORS - Dysrhythmia and Vascular Compromise  MANAGEMENT- Bedside Assessment, supervision, transfer  INTERPRETATION -  ECG, Blood Pressure and Cardiac Output Measures   INTERVENTIONS - hemodynamic mngmt and Metobolic interventions  CASE REVIEW -ER physician  TREATMENT RESPONSE -Stable  PERFORMED BY - Self    NOTES   :  I have spent critical care time involved in lab review, consultations with specialist, family decision- making, bedside attention and documentation. This time excludes time spent in any separate billed procedures. During this entire length of time I was immediately available to the patient .     Yoan Miller MD        Disposition    Transfer to another facility              Diagnosis    Clinical Impression:  ACS        Yoan Miller MD    Please note that this dictation was completed with Fondu, the ProNerve voice recognition software. Quite often unanticipated grammatical, syntax, homophones, and other interpretive errors are inadvertently transcribed by the computer software. Please disregard these errors. Please excuse any errors that have escaped final proofreading. Thank you. Past Medical History:   Diagnosis Date    Abdominal hernia 1/29/2020    Benign prostatic hyperplasia 3/16/2021    Congenital polycystic kidney 3/16/2021    Congestive heart failure ([facility] [address] 3/25/2020    Coronary arteriosclerosis 3/25/2020    Heart attack ([facility] [address] 3/18/2020 and 06/30/2020    History of COVID-19 3/28/2021    Hypertension     Inguinal hernia     Prediabetes 3/28/2021    Spinal stenosis        Past Surgical History:   Procedure Laterality Date    HX HERNIA REPAIR  5490    umbiblical hernia    [place] CARDIAC SURG PROCEDURE UNLIST  06/30/2020    Stent placement         Family History:   Problem Relation Age of Onset    Hypertension Mother     Diabetes Mother     Kidney Disease Mother     Heart Disease Father        Social History     Socioeconomic History    Marital status:      Spouse name: Not on file    Number of children: Not on file    Years of education: Not on file    Highest education level: Not on file   Occupational History    Not on file   Tobacco Use    Smoking status: Never Smoker    Smokeless tobacco: Never Used   Vaping Use    Vaping Use: Never used   Substance and Sexual Activity    Alcohol use: Not Currently    Drug use: Never    Sexual activity: Yes     Partners: Female   Other Topics Concern    Not on file   Social History Narrative    Not on file     Social Determinants of Health     Financial Resource Strain:     Difficulty of Paying Living Expenses: Not on file   Food Insecurity:     Worried About Running Out of Food in the Last Year: Not on file    Shira of Food in the Last Year: Not on file   Transportation Needs:     Lack of Transportation (Medical):  Not on file    Lack of Transportation (Non-Medical): Not on file   Physical Activity:     Days of Exercise per Week: Not on file    Minutes of Exercise per Session: Not on file   Stress:     Feeling of Stress : Not on file   Social Connections:     Frequency of Communication with Friends and Family: Not on file    Frequency of Social Gatherings with Friends and Family: Not on file    Attends [employer] Services: Not on file    Active Member of [address] [place] [address] or Organizations: Not on file    Attends Club or Organization Meetings: Not on file    Marital Status: Not on file   Intimate Partner Violence:     Fear of Current or Ex-Partner: Not on file    Emotionally Abused: Not on file    Physically Abused: Not on file    Sexually Abused: Not on file   Housing Stability:     Unable to Pay for Housing in the Last Year: Not on file    Number of [place] in the Last Year: Not on file    Unstable Housing in the Last Year: Not on file         ALLERGIES: Adhesive tape-silicones    Review of Systems   Constitutional: Negative. HENT: Negative. Eyes: Negative. Respiratory: Negative. Cardiovascular: Positive for chest pain. Negative for palpitations and leg swelling. Gastrointestinal: Negative. Endocrine: Negative. Genitourinary: Negative. Musculoskeletal: Negative. Skin: Negative. Neurological: Negative. Hematological: Negative. Vitals:    11/07/21 2057   BP: (!) 138/99   Pulse: 84   Resp: 16   Temp: 98.2 °F (36.8 °C)   SpO2: 98%   Weight: 108.9 kg (240 lb)   Height: 6' 3\" (1.905 m)            Physical Exam  Vitals and nursing note reviewed. Constitutional:       General: He is in acute distress. Appearance: Normal appearance. He is well-developed and normal weight. HENT:      Head: Normocephalic and atraumatic.       Right Ear: Tympanic membrane, ear canal and external ear normal.      Left Ear: Tympanic membrane, ear canal and external ear normal.      Nose: Nose normal. Mouth/Throat:      Mouth: Mucous membranes are moist.      Pharynx: Oropharynx is clear. Eyes:      Extraocular Movements: Extraocular movements intact. Conjunctiva/sclera: Conjunctivae normal.      Pupils: Pupils are equal, round, and reactive to light. Cardiovascular:      Rate and Rhythm: Normal rate and regular rhythm. Pulses: Normal pulses. Heart sounds: Normal heart sounds. Pulmonary:      Effort: Pulmonary effort is normal.      Breath sounds: Normal breath sounds. Chest:      Chest wall: Tenderness present. Comments: Reproducible tenderness  Abdominal:      General: Abdomen is flat. Bowel sounds are normal.      Palpations: Abdomen is soft. Musculoskeletal:         General: Normal range of motion. Cervical back: Normal range of motion and neck supple. Skin:     General: Skin is warm and dry. Neurological:      General: No focal deficit present. Mental Status: He is alert and oriented to person, place, and time.           MDM  Number of Diagnoses or Management Options     Amount and/or Complexity of Data Reviewed  Clinical lab tests: reviewed  Tests in the radiology section of CPT®: reviewed    Risk of Complications, Morbidity, and/or Mortality  Presenting problems: high  Diagnostic procedures: high  Management options: high    Patient Progress  Patient progress: stable         Procedures

## 2021-11-08 NOTE — ED NOTES
Report called to Jonn Kilpatrick RN at [address] [place] [address] ED. No further questions or concerns noted. Patient transported to [address] [place] [address] ED via [facility] in stable condition at this time.

## 2021-11-09 LAB
ATRIAL RATE: 100 BPM
ATRIAL RATE: 83 BPM
CALCULATED P AXIS, ECG09: 39 DEGREES
CALCULATED P AXIS, ECG09: 50 DEGREES
CALCULATED R AXIS, ECG10: -33 DEGREES
CALCULATED R AXIS, ECG10: 14 DEGREES
CALCULATED T AXIS, ECG11: 121 DEGREES
CALCULATED T AXIS, ECG11: 148 DEGREES
DIAGNOSIS, 93000: NORMAL
DIAGNOSIS, 93000: NORMAL
P-R INTERVAL, ECG05: 191 MS
P-R INTERVAL, ECG05: 194 MS
Q-T INTERVAL, ECG07: 350 MS
Q-T INTERVAL, ECG07: 394 MS
QRS DURATION, ECG06: 121 MS
QRS DURATION, ECG06: 121 MS
QTC CALCULATION (BEZET), ECG08: 452 MS
QTC CALCULATION (BEZET), ECG08: 463 MS
VENTRICULAR RATE, ECG03: 100 BPM
VENTRICULAR RATE, ECG03: 83 BPM

## 2021-11-16 ENCOUNTER — OFFICE VISIT (OUTPATIENT)
Dept: PRIMARY CARE CLINIC | Age: 51
End: 2021-11-16
Payer: COMMERCIAL

## 2021-11-16 VITALS
RESPIRATION RATE: 18 BRPM | TEMPERATURE: 97.8 F | DIASTOLIC BLOOD PRESSURE: 75 MMHG | OXYGEN SATURATION: 98 % | WEIGHT: 245 LBS | SYSTOLIC BLOOD PRESSURE: 121 MMHG | BODY MASS INDEX: 30.62 KG/M2 | HEART RATE: 92 BPM

## 2021-11-16 DIAGNOSIS — I50.22 CHRONIC SYSTOLIC CONGESTIVE HEART FAILURE (HCC): ICD-10-CM

## 2021-11-16 DIAGNOSIS — I10 ESSENTIAL HYPERTENSION: ICD-10-CM

## 2021-11-16 DIAGNOSIS — I25.10 CAD, MULTIPLE VESSEL: ICD-10-CM

## 2021-11-16 DIAGNOSIS — M48.00 SPINAL STENOSIS, UNSPECIFIED SPINAL REGION: ICD-10-CM

## 2021-11-16 DIAGNOSIS — E78.2 MIXED HYPERLIPIDEMIA: ICD-10-CM

## 2021-11-16 DIAGNOSIS — N18.4 CKD (CHRONIC KIDNEY DISEASE) STAGE 4, GFR 15-29 ML/MIN (HCC): Primary | ICD-10-CM

## 2021-11-16 DIAGNOSIS — E11.65 TYPE 2 DIABETES MELLITUS WITH HYPERGLYCEMIA, WITHOUT LONG-TERM CURRENT USE OF INSULIN (HCC): ICD-10-CM

## 2021-11-16 DIAGNOSIS — E27.40 ALDOSTERONE DEFICIENCY (HCC): ICD-10-CM

## 2021-11-16 DIAGNOSIS — K40.90 LEFT INGUINAL HERNIA: ICD-10-CM

## 2021-11-16 DIAGNOSIS — E66.09 CLASS 1 OBESITY DUE TO EXCESS CALORIES WITH SERIOUS COMORBIDITY AND BODY MASS INDEX (BMI) OF 31.0 TO 31.9 IN ADULT: ICD-10-CM

## 2021-11-16 DIAGNOSIS — N40.0 BENIGN PROSTATIC HYPERPLASIA WITHOUT LOWER URINARY TRACT SYMPTOMS: ICD-10-CM

## 2021-11-16 DIAGNOSIS — Z09 HOSPITAL DISCHARGE FOLLOW-UP: ICD-10-CM

## 2021-11-16 PROBLEM — E78.5 HYPERLIPIDEMIA, UNSPECIFIED: Status: ACTIVE | Noted: 2021-11-16

## 2021-11-16 PROCEDURE — 1111F DSCHRG MED/CURRENT MED MERGE: CPT | Performed by: NURSE PRACTITIONER

## 2021-11-16 PROCEDURE — 99214 OFFICE O/P EST MOD 30 MIN: CPT | Performed by: NURSE PRACTITIONER

## 2021-11-16 RX ORDER — DAPAGLIFLOZIN 10 MG/1
10 TABLET, FILM COATED ORAL DAILY
COMMUNITY
Start: 2021-10-28 | End: 2021-12-16 | Stop reason: ALTCHOICE

## 2021-11-16 RX ORDER — INSULIN GLARGINE 100 [IU]/ML
INJECTION, SOLUTION SUBCUTANEOUS
COMMUNITY
Start: 2021-11-10 | End: 2022-07-24 | Stop reason: ALTCHOICE

## 2021-11-16 NOTE — LETTER
NOTIFICATION RETURN TO WORK / SCHOOL    11/16/2021 3:54 PM    Mr. Veronica Hawkins  [address] [address]      To Whom It May Concern:    Veronica Hawkins is currently under the care of [facility]. He will return to work/school on: 11/22/21    If there are questions or concerns please have the patient contact our office.         Sincerely,      Benny Simmonds, NP

## 2021-11-17 LAB
ALBUMIN SERPL-MCNC: 3.6 G/DL (ref 4–5)
BASOPHILS # BLD AUTO: 0 X10E3/UL (ref 0–0.2)
BASOPHILS NFR BLD AUTO: 0 %
BUN SERPL-MCNC: 38 MG/DL (ref 6–24)
BUN/CREAT SERPL: 10 (ref 9–20)
CALCIUM SERPL-MCNC: 9 MG/DL (ref 8.7–10.2)
CHLORIDE SERPL-SCNC: 99 MMOL/L (ref 96–106)
CO2 SERPL-SCNC: 23 MMOL/L (ref 20–29)
CREAT SERPL-MCNC: 3.66 MG/DL (ref 0.76–1.27)
EOSINOPHIL # BLD AUTO: 0.2 X10E3/UL (ref 0–0.4)
EOSINOPHIL NFR BLD AUTO: 2 %
ERYTHROCYTE [DISTWIDTH] IN BLOOD BY AUTOMATED COUNT: 13.4 % (ref 11.6–15.4)
FERRITIN SERPL-MCNC: 41 NG/ML (ref 30–400)
GLUCOSE SERPL-MCNC: 254 MG/DL (ref 65–99)
HCT VFR BLD AUTO: 38.6 % (ref 37.5–51)
HGB BLD-MCNC: 12.2 G/DL (ref 13–17.7)
IMM GRANULOCYTES # BLD AUTO: 0 X10E3/UL (ref 0–0.1)
IMM GRANULOCYTES NFR BLD AUTO: 0 %
LYMPHOCYTES # BLD AUTO: 1.2 X10E3/UL (ref 0.7–3.1)
LYMPHOCYTES NFR BLD AUTO: 13 %
MCH RBC QN AUTO: 25.7 PG (ref 26.6–33)
MCHC RBC AUTO-ENTMCNC: 31.6 G/DL (ref 31.5–35.7)
MCV RBC AUTO: 81 FL (ref 79–97)
MONOCYTES # BLD AUTO: 1 X10E3/UL (ref 0.1–0.9)
MONOCYTES NFR BLD AUTO: 11 %
NEUTROPHILS # BLD AUTO: 6.7 X10E3/UL (ref 1.4–7)
NEUTROPHILS NFR BLD AUTO: 74 %
PHOSPHATE SERPL-MCNC: 5.2 MG/DL (ref 2.8–4.1)
PLATELET # BLD AUTO: 200 X10E3/UL (ref 150–450)
POTASSIUM SERPL-SCNC: 4 MMOL/L (ref 3.5–5.2)
RBC # BLD AUTO: 4.74 X10E6/UL (ref 4.14–5.8)
SODIUM SERPL-SCNC: 136 MMOL/L (ref 134–144)
WBC # BLD AUTO: 9.1 X10E3/UL (ref 3.4–10.8)

## 2021-11-22 NOTE — PROGRESS NOTES
Please let patient know that anemia is stable. His kidney functions are improved from 2 weeks ago but still not returned to prior baseline. I have sent referral to see Dr. Rob Barger as this will be closer for him. Please make sure patient has appointment and that he carries a copy of these labs.

## 2021-11-26 ENCOUNTER — TELEPHONE (OUTPATIENT)
Dept: SURGERY | Age: 51
End: 2021-11-26

## 2021-11-26 NOTE — TELEPHONE ENCOUNTER
Spoke with MrHector And  patient is back on his blood thinner since he had another heart attack.  signed off for cardiac clearance but wrote that the patient should've stopped his blood thinner but that is not the case.  is following up with  on November 30th so we will wait until then. I informed the patient I will also discuss this with  and give them a call after their appointment with Topher Phillip.

## 2021-11-29 NOTE — PROGRESS NOTES
Gemma Daniels is a 54 y.o. male who presents to the office today for the following:    Chief Complaint   Patient presents with   [facility] Follow Up    Surgical Follow-up       Past Medical History:   Diagnosis Date    Abdominal hernia 1/29/2020    Benign prostatic hyperplasia 3/16/2021    Congenital polycystic kidney 3/16/2021    Congestive heart failure ([facility] [address] 3/25/2020    Coronary arteriosclerosis 3/25/2020    Heart attack ([facility] [address] 3/18/2020 and 06/30/2020    History of COVID-19 3/28/2021    Hypertension     Inguinal hernia     Prediabetes 3/28/2021    Spinal stenosis        Past Surgical History:   Procedure Laterality Date    HX HERNIA REPAIR  4717    umbiblical hernia    [place] CARDIAC SURG PROCEDURE UNLIST  06/30/2020    Stent placement        Family History   Problem Relation Age of Onset    Hypertension Mother     Diabetes Mother     Kidney Disease Mother     Heart Disease Father         Social History     Tobacco Use    Smoking status: Never Smoker    Smokeless tobacco: Never Used   Vaping Use    Vaping Use: Never used   Substance Use Topics    Alcohol use: Not Currently    Drug use: Never      HPI  Patient  with PMH of CAD w/ stents, hyperlipidemia, hypertension, CHF, BPH, prediabetes, CKD, covid 19,chronic back pain and obesity who is here for hospital follow up at [address] [place] [address] due to uncontrolled sugars and NSTEMI on 11/8/21-11/10/21. States that he is feeling better since hospitalization and taking medicines as directed. Wife states that sugars have been down below 200 since being home. No further chest pain. Is seeing his cardiologist and pain specialist. Has not had recent follow up with nephrologist and wants to be set up with someone closer than [place]. Current Outpatient Medications on File Prior to Visit   Medication Sig    Lantus Solostar U-100 Insulin 100 unit/mL (3 mL) inpn ADMINISTER 30 UNITS UNDER THE SKIN DAILY    Farxiga 10 mg tab tablet Take 10 mg by mouth daily.     clotrimazole (LOTRIMIN) 1 % topical cream Apply  to affected area two (2) times a day.  hydrocortisone (CORTAID) 1 % topical cream Apply  to affected area two (2) times a day. use thin layer    aspirin delayed-release 81 mg tablet TAKE 1 TABLET BY MOUTH DAILY    atorvastatin (LIPITOR) 20 mg tablet TAKE 1 TABLET BY MOUTH DAILY    ezetimibe (ZETIA) 10 mg tablet TAKE 1 TABLET BY MOUTH DAILY    hydrALAZINE (APRESOLINE) 10 mg tablet TAKE 2 TABLETS BY MOUTH EVERY 8 HOURS    isosorbide mononitrate ER (IMDUR) 30 mg tablet TAKE 1 TABLET BY MOUTH DAILY    potassium chloride SR (KLOR-CON 10) 10 mEq tablet TAKE 2 TABLETS BY MOUTH TWICE DAILY    prasugreL (EFFIENT) 10 mg tablet TAKE 1 TABLET BY MOUTH DAILY    tamsulosin (FLOMAX) 0.4 mg capsule TAKE 1 CAPSULE BY MOUTH DAILY    HYDROcodone-acetaminophen (NORCO) 5-325 mg per tablet Dr Andres Flores nitroglycerin (NITROSTAT) 0.4 mg SL tablet ONE TABLET UNDER TONGUE AS NEEDED FOR CHEST PAIN    carvediloL (COREG) 12.5 mg tablet TAKE 1 TABLET BY MOUTH TWICE DAILY    [DISCONTINUED] furosemide (LASIX) 40 mg tablet 80 mg daily. No current facility-administered medications on file prior to visit. No orders of the defined types were placed in this encounter. Review of Systems   Constitutional: Negative. Eyes: Negative. Respiratory: Positive for shortness of breath. Negative for cough, hemoptysis, sputum production and wheezing. Cardiovascular: Positive for leg swelling. Negative for chest pain, palpitations, claudication and PND. Gastrointestinal: Negative. Genitourinary: Negative. Negative for dysuria, frequency and urgency. Musculoskeletal: Positive for back pain and myalgias. Negative for falls. Neurological: Negative. Psychiatric/Behavioral: Negative.         Visit Vitals  /75 (BP 1 Location: Left upper arm, BP Patient Position: Sitting, BP Cuff Size: Adult)   Pulse 92   Temp 97.8 °F (36.6 °C) (Temporal)   Resp 18   Wt 245 lb (111.1 kg)   SpO2 98%   BMI 30.62 kg/m²       Physical Exam  Vitals and nursing note reviewed. Constitutional:       Appearance: Normal appearance. He is obese. Eyes:      Pupils: Pupils are equal, round, and reactive to light. Neck:      Vascular: No carotid bruit. Cardiovascular:      Rate and Rhythm: Normal rate and regular rhythm. Pulses: Normal pulses. Pulmonary:      Effort: Pulmonary effort is normal.      Breath sounds: Normal breath sounds. Abdominal:      General: Bowel sounds are normal.      Palpations: Abdomen is soft. Tenderness: There is no abdominal tenderness. Hernia: A hernia (left inguinal extending into scrotum) is present. Musculoskeletal:         General: Normal range of motion. Right lower leg: Edema (trace) present. Left lower leg: Edema (trace) present. Lymphadenopathy:      Cervical: No cervical adenopathy. Skin:     General: Skin is warm and dry. Neurological:      Mental Status: He is alert and oriented to person, place, and time. Mental status is at baseline. Psychiatric:         Mood and Affect: Mood normal.         Behavior: Behavior normal.            1. Chronic systolic congestive heart failure (HCC)   EF 20-25% on review of notes from [facility] 11/8/21  Euvolemic on exam and lasix was reduced with starting farxiga 10mg during hospital stay  Follows with Dr. Ingrid To at [address] [place] [address] cardiology and will continue care with them    2. CAD, multiple vessel  Patient has h/o MI and 3 stents with last 2 placed in 6/2020  Positive troponin and admitted for evaluation and monitoring  Symptoms improved  Remains on effient and asa  Follows with Dr. Wilda Sagastume at [address] [place] [address] cardiology and will continue care with them    3. Essential hypertension  Blood pressure is controlled and continue medication as directed  Monitor at home and notify provider if > 140/90    4. Benign prostatic hyperplasia without lower urinary tract symptoms  Symptoms reported as stable    5.  Type 2 diabetes mellitus with hyperglycemia, without long-term current use of insulin ([facility])  A1c 11/8/21 > 14 at [facility] with prior 6.5 in 7/2021  Started on farxiga 10mg daily and tolerating well  Also started on lantus 30 units nightly  Fasting sugars are under 200 now with no low sugars reported  He is working on following diabetic diet and staying active  Discussed diabetic eye and foot exams  Encourage to check sugars 1-2 times daily and bring to next visit in 1 month    6. Spinal stenosis, unspecified spinal region  Symptoms reported as stable and is on chronic pain medication  Follows with pain specialist in [place]    7. Class 1 obesity due to excess calories with serious comorbidity and body mass index (BMI) of 31.0 to 31.9 in adult  Continue to encourage weight loss. Recommend decreasing excess fat, salt and sugar in diet along with getting regular exercise 3-5 times weekly     8. Mixed hyperlipidemia  Last LDL per patient phone records was 79 in 12/2020 and continues on atorvastatin 40mg as directed (highest dose tolerated). 9. Left inguinal hernia  Has discomfort at site still and has to manually reduce  Was referred to Dr. Pamela Boston who has recommended repair but will have to see cardiac clearance is provided by Dr. Bryson Castillo given recent hospitalization. 10. Stage 3b chronic kidney disease ([address]  Lab Results   Component Value Date/Time    Creatinine 3.66 (H) 11/16/2021 04:14 PM   Will set up with [place] kidney specialist for follow up  - CBC WITH AUTOMATED DIFF  - RENAL FUNCTION PANEL  - FERRITIN  - REFERRAL TO NEPHROLOGY    11. Aldosterone deficiency ([facility] [address]  Was recommended by endocrinology at [facility] to have further work up for potential aldosterone insufficiency. [address] [place] [address] discharge follow up    13.  Anemia  Lab Results   Component Value Date/Time    WBC 9.1 11/16/2021 04:14 PM    HGB 12.2 (L) 11/16/2021 04:14 PM    HCT 38.6 11/16/2021 04:14 PM    PLATELET 315 76/83/8928 04:14 PM    MCV 81 11/16/2021 04:14 PM Lab Results   Component Value Date/Time    Ferritin 41 11/16/2021 04:14 PM   Stable    Patient/wife verbalizes understanding of plan of care as discussed above    Follow-up and Dispositions    · Return in about 4 weeks (around 12/14/2021) for or sooner for worsening symptoms.

## 2021-11-30 ENCOUNTER — HOSPITAL ENCOUNTER (OUTPATIENT)
Dept: CARDIAC REHAB | Age: 51
Discharge: HOME | End: 2021-11-30
Payer: COMMERCIAL

## 2021-11-30 VITALS — BODY MASS INDEX: 30.66 KG/M2 | WEIGHT: 246.6 LBS | HEIGHT: 75 IN

## 2021-11-30 PROCEDURE — 93797 PHYS/QHP OP CAR RHAB WO ECG: CPT

## 2021-11-30 PROCEDURE — 93798 PHYS/QHP OP CAR RHAB W/ECG: CPT

## 2021-11-30 RX ORDER — FUROSEMIDE 40 MG/1
40 TABLET ORAL DAILY
COMMUNITY
End: 2022-04-09

## 2021-11-30 RX ORDER — FUROSEMIDE 20 MG/1
20 TABLET ORAL DAILY
Status: ON HOLD | COMMUNITY
End: 2022-04-09 | Stop reason: SDUPTHER

## 2021-11-30 NOTE — CARDIO/PULMONARY
Dominik Chang   54 y.o.    presented to cardiac rehab for orientation and exercise tolerance test today with a primary diagnosis of PCI (thrombosis to LAD). Dominik Chang has a history of MI, PCI, HF, DM, congenital polycystic kidney disease, obesity, prostatitis, BPH, CAD, HTN, CKD stage 4, spinal stenosis. Cardiac risk factors include DM, HTN, hyperlipdemia, obesity. Dominik Cahng is   and lives with Rio Pastor his wife. PHQ9, depression score, is 0 and this is considered normal. The result was discussed with patient who affirms score to be accurate and no intervention required. Patient denied chest pain or SOB during 6 minute walk test and was in SR-ST w/ BBB occ PVC. Exercise prescription developed around patient stated goals, to be supplemented with home exercise recommendations. EF is 25-30%. Education manual given to patient and reviewed.  Patient will attend cardiac rehab 2-3 times/week and education

## 2021-12-01 ENCOUNTER — HOSPITAL ENCOUNTER (OUTPATIENT)
Dept: CARDIAC REHAB | Age: 51
Discharge: HOME | End: 2021-12-01
Payer: COMMERCIAL

## 2021-12-01 VITALS — BODY MASS INDEX: 30.5 KG/M2 | WEIGHT: 244 LBS

## 2021-12-01 PROCEDURE — 93798 PHYS/QHP OP CAR RHAB W/ECG: CPT

## 2021-12-02 NOTE — TELEPHONE ENCOUNTER
Spoke with  she states that  has to reschedule his appointment with  due to an emergency  had. She is still waiting for a call from their office and once she hears from them she will give me a call back. I told her that I would stay in touch as well.

## 2021-12-08 ENCOUNTER — APPOINTMENT (OUTPATIENT)
Dept: CARDIAC REHAB | Age: 51
End: 2021-12-08
Payer: COMMERCIAL

## 2021-12-13 ENCOUNTER — APPOINTMENT (OUTPATIENT)
Dept: CARDIAC REHAB | Age: 51
End: 2021-12-13
Payer: COMMERCIAL

## 2021-12-15 ENCOUNTER — APPOINTMENT (OUTPATIENT)
Dept: CARDIAC REHAB | Age: 51
End: 2021-12-15
Payer: COMMERCIAL

## 2021-12-16 ENCOUNTER — OFFICE VISIT (OUTPATIENT)
Dept: PRIMARY CARE CLINIC | Age: 51
End: 2021-12-16
Payer: COMMERCIAL

## 2021-12-16 VITALS
TEMPERATURE: 97.6 F | BODY MASS INDEX: 31.62 KG/M2 | RESPIRATION RATE: 18 BRPM | HEART RATE: 92 BPM | WEIGHT: 253 LBS | SYSTOLIC BLOOD PRESSURE: 132 MMHG | OXYGEN SATURATION: 98 % | DIASTOLIC BLOOD PRESSURE: 81 MMHG

## 2021-12-16 DIAGNOSIS — E11.65 TYPE 2 DIABETES MELLITUS WITH HYPERGLYCEMIA, WITHOUT LONG-TERM CURRENT USE OF INSULIN (HCC): Primary | ICD-10-CM

## 2021-12-16 PROCEDURE — 99213 OFFICE O/P EST LOW 20 MIN: CPT | Performed by: NURSE PRACTITIONER

## 2021-12-16 RX ORDER — CARVEDILOL 12.5 MG/1
3 TABLET ORAL 2 TIMES DAILY WITH MEALS
COMMUNITY

## 2021-12-16 NOTE — PROGRESS NOTES
Marcelle Winkler is a 54 y.o. male who presents to the office today for the following:    Chief Complaint   Patient presents with    Diabetes       Past Medical History:   Diagnosis Date    Abdominal hernia 1/29/2020    Benign prostatic hyperplasia 3/16/2021    Chronic kidney disease     congenital polycytic kidneys    Congenital polycystic kidney 3/16/2021    Congestive heart failure ([address] 3/25/2020    Coronary arteriosclerosis 3/25/2020    Heart attack ([address] 3/18/2020 and 06/30/2020    History of COVID-19 3/28/2021    Hypertension     Ill-defined condition     BPH, prostatitis    Ill-defined condition     hernia    Ill-defined condition     ischemic cardiomyopathy    Ill-defined condition     (r) knee pain    Inguinal hernia     Morbid obesity ([address]     Prediabetes 3/28/2021    Spinal stenosis        Past Surgical History:   Procedure Laterality Date    HX HERNIA REPAIR  3118    umbiblical hernia    [place] CARDIAC SURG PROCEDURE UNLIST  06/30/2020    Stent placement        Family History   Problem Relation Age of Onset    Hypertension Mother     Diabetes Mother     Kidney Disease Mother     Heart Disease Father     Hypertension Sister     Diabetes Brother     Kidney Disease Brother     No Known Problems Sister         Social History     Tobacco Use    Smoking status: Never Smoker    Smokeless tobacco: Never Used   Vaping Use    Vaping Use: Never used   Substance Use Topics    Alcohol use: Not Currently    Drug use: Never      HPI  Patient here today for 1 month follow up of elevated sugars with  PMH of CAD w/ stents, hyperlipidemia, hypertension, CHF, BPH, prediabetes, CKD, covid 19,chronic back pain and obesity. Reports compliance with medications that are noted in chart but did stop the farxiga that was previously started. States that he did not \"feel well\" with taking. Is taking the lantus however. Sugars have been under 200 fasting even being off the farxiga.  No sugars > 350 or < 70.    Current Outpatient Medications on File Prior to Visit   Medication Sig    carvediloL (COREG) 12.5 mg tablet Take 3 Tablets by mouth two (2) times daily (with meals).  furosemide (LASIX) 40 mg tablet Take 40 mg by mouth daily.  furosemide (LASIX) 20 mg tablet Take 20 mg by mouth daily. 1200    Lantus Solostar U-100 Insulin 100 unit/mL (3 mL) inpn ADMINISTER 30 UNITS UNDER THE SKIN DAILY    aspirin delayed-release 81 mg tablet TAKE 1 TABLET BY MOUTH DAILY    atorvastatin (LIPITOR) 20 mg tablet TAKE 1 TABLET BY MOUTH DAILY    ezetimibe (ZETIA) 10 mg tablet TAKE 1 TABLET BY MOUTH DAILY    hydrALAZINE (APRESOLINE) 10 mg tablet Take 20 mg by mouth three (3) times daily.  isosorbide mononitrate ER (IMDUR) 30 mg tablet TAKE 1 TABLET BY MOUTH DAILY    potassium chloride SR (KLOR-CON 10) 10 mEq tablet 20 mEq.  prasugreL (EFFIENT) 10 mg tablet TAKE 1 TABLET BY MOUTH DAILY    tamsulosin (FLOMAX) 0.4 mg capsule TAKE 1 CAPSULE BY MOUTH DAILY    HYDROcodone-acetaminophen (NORCO) 5-325 mg per tablet Dr Vivi Caputo nitroglycerin (NITROSTAT) 0.4 mg SL tablet ONE TABLET UNDER TONGUE AS NEEDED FOR CHEST PAIN     No current facility-administered medications on file prior to visit. No orders of the defined types were placed in this encounter. Review of Systems   Constitutional: Negative. Eyes: Negative. Respiratory: Negative for cough, hemoptysis, sputum production and wheezing. Cardiovascular: Positive for leg swelling. Negative for chest pain, palpitations, claudication and PND. Gastrointestinal: Negative. Genitourinary: Negative. Negative for dysuria, frequency and urgency. Musculoskeletal: Positive for back pain and myalgias. Negative for falls. Neurological: Negative. Psychiatric/Behavioral: Negative.         Visit Vitals  /81 (BP 1 Location: Left upper arm, BP Patient Position: Sitting, BP Cuff Size: Adult)   Pulse 92   Temp 97.6 °F (36.4 °C) (Temporal)   Resp 18   Wt 253 lb (114.8 kg)   SpO2 98%   BMI 31.62 kg/m²       Physical Exam  Vitals and nursing note reviewed. Exam conducted with a chaperone present. Constitutional:       Appearance: Normal appearance. He is obese. Cardiovascular:      Rate and Rhythm: Normal rate and regular rhythm. Pulses: Normal pulses. Pulmonary:      Effort: Pulmonary effort is normal.      Breath sounds: Normal breath sounds. Abdominal:      General: Bowel sounds are normal.      Palpations: Abdomen is soft. Tenderness: There is no abdominal tenderness. Musculoskeletal:         General: Normal range of motion. Right lower leg: Edema (trace) present. Left lower leg: Edema (trace) present. Skin:     General: Skin is warm and dry. Neurological:      Mental Status: He is alert and oriented to person, place, and time. Mental status is at baseline. 5. Type 2 diabetes mellitus with hyperglycemia, without long-term current use of insulin ([facility])  A1c 11/8/21 > 14 at [facility] with prior 6.5 in 7/2021  Stopped Lauree Humbles due to side effects and declines trial with another medication  Reports fasting sugars < 200  Is continuing the lantus 30 units nightly  Also continuing to work on  following diabetic diet and staying active  Advised if fastings are going above 200 or less than 70, follow up with provider  Encourage regular eye  and foot exams  Encourage to check sugars 1-2 times daily and bring to next visit in 2 months       Patient/wife verbalizes understanding of plan of care as discussed above    Follow-up and Dispositions    · Return in about 2 months (around 2/16/2022), or if symptoms worsen or fail to improve.

## 2021-12-20 ENCOUNTER — APPOINTMENT (OUTPATIENT)
Dept: CARDIAC REHAB | Age: 51
End: 2021-12-20
Payer: COMMERCIAL

## 2021-12-22 ENCOUNTER — APPOINTMENT (OUTPATIENT)
Dept: CARDIAC REHAB | Age: 51
End: 2021-12-22
Payer: COMMERCIAL

## 2021-12-27 ENCOUNTER — APPOINTMENT (OUTPATIENT)
Dept: CARDIAC REHAB | Age: 51
End: 2021-12-27
Payer: COMMERCIAL

## 2021-12-29 ENCOUNTER — APPOINTMENT (OUTPATIENT)
Dept: CARDIAC REHAB | Age: 51
End: 2021-12-29
Payer: COMMERCIAL

## 2022-01-03 ENCOUNTER — APPOINTMENT (OUTPATIENT)
Dept: CARDIAC REHAB | Age: 52
End: 2022-01-03

## 2022-01-05 ENCOUNTER — APPOINTMENT (OUTPATIENT)
Dept: CARDIAC REHAB | Age: 52
End: 2022-01-05

## 2022-01-10 ENCOUNTER — APPOINTMENT (OUTPATIENT)
Dept: CARDIAC REHAB | Age: 52
End: 2022-01-10

## 2022-01-12 ENCOUNTER — APPOINTMENT (OUTPATIENT)
Dept: CARDIAC REHAB | Age: 52
End: 2022-01-12

## 2022-01-17 ENCOUNTER — APPOINTMENT (OUTPATIENT)
Dept: CARDIAC REHAB | Age: 52
End: 2022-01-17

## 2022-01-19 ENCOUNTER — APPOINTMENT (OUTPATIENT)
Dept: CARDIAC REHAB | Age: 52
End: 2022-01-19

## 2022-01-24 ENCOUNTER — APPOINTMENT (OUTPATIENT)
Dept: CARDIAC REHAB | Age: 52
End: 2022-01-24

## 2022-01-26 ENCOUNTER — APPOINTMENT (OUTPATIENT)
Dept: CARDIAC REHAB | Age: 52
End: 2022-01-26

## 2022-01-31 ENCOUNTER — APPOINTMENT (OUTPATIENT)
Dept: CARDIAC REHAB | Age: 52
End: 2022-01-31

## 2022-02-02 ENCOUNTER — APPOINTMENT (OUTPATIENT)
Dept: CARDIAC REHAB | Age: 52
End: 2022-02-02

## 2022-02-07 ENCOUNTER — APPOINTMENT (OUTPATIENT)
Dept: CARDIAC REHAB | Age: 52
End: 2022-02-07

## 2022-02-09 ENCOUNTER — APPOINTMENT (OUTPATIENT)
Dept: CARDIAC REHAB | Age: 52
End: 2022-02-09

## 2022-02-14 ENCOUNTER — APPOINTMENT (OUTPATIENT)
Dept: CARDIAC REHAB | Age: 52
End: 2022-02-14

## 2022-02-16 ENCOUNTER — APPOINTMENT (OUTPATIENT)
Dept: CARDIAC REHAB | Age: 52
End: 2022-02-16

## 2022-02-21 ENCOUNTER — APPOINTMENT (OUTPATIENT)
Dept: CARDIAC REHAB | Age: 52
End: 2022-02-21

## 2022-02-23 ENCOUNTER — APPOINTMENT (OUTPATIENT)
Dept: CARDIAC REHAB | Age: 52
End: 2022-02-23

## 2022-02-28 ENCOUNTER — APPOINTMENT (OUTPATIENT)
Dept: CARDIAC REHAB | Age: 52
End: 2022-02-28

## 2022-03-02 ENCOUNTER — APPOINTMENT (OUTPATIENT)
Dept: CARDIAC REHAB | Age: 52
End: 2022-03-02

## 2022-03-07 ENCOUNTER — APPOINTMENT (OUTPATIENT)
Dept: CARDIAC REHAB | Age: 52
End: 2022-03-07

## 2022-03-09 ENCOUNTER — APPOINTMENT (OUTPATIENT)
Dept: CARDIAC REHAB | Age: 52
End: 2022-03-09

## 2022-03-14 ENCOUNTER — APPOINTMENT (OUTPATIENT)
Dept: CARDIAC REHAB | Age: 52
End: 2022-03-14

## 2022-03-16 ENCOUNTER — APPOINTMENT (OUTPATIENT)
Dept: CARDIAC REHAB | Age: 52
End: 2022-03-16

## 2022-03-18 PROBLEM — I25.5 GENERALIZED ISCHEMIC MYOCARDIAL DYSFUNCTION: Status: ACTIVE | Noted: 2021-03-16

## 2022-03-18 PROBLEM — Q61.3 CONGENITAL POLYCYSTIC KIDNEY: Status: ACTIVE | Noted: 2021-03-16

## 2022-03-18 PROBLEM — N41.9 PROSTATITIS: Status: ACTIVE | Noted: 2021-03-16

## 2022-03-19 PROBLEM — K46.9 ABDOMINAL HERNIA: Status: ACTIVE | Noted: 2020-01-29

## 2022-03-19 PROBLEM — E78.5 HYPERLIPIDEMIA, UNSPECIFIED: Status: ACTIVE | Noted: 2021-11-16

## 2022-03-19 PROBLEM — R73.03 PREDIABETES: Status: ACTIVE | Noted: 2021-03-28

## 2022-03-19 PROBLEM — I10 HYPERTENSION: Status: ACTIVE | Noted: 2020-01-29

## 2022-03-19 PROBLEM — E66.9 OBESITY: Status: ACTIVE | Noted: 2021-03-16

## 2022-03-19 PROBLEM — M48.00 SPINAL STENOSIS: Status: ACTIVE | Noted: 2020-01-29

## 2022-03-19 PROBLEM — I25.10 CORONARY ARTERIOSCLEROSIS: Status: ACTIVE | Noted: 2020-03-25

## 2022-03-20 PROBLEM — I50.9 CONGESTIVE HEART FAILURE (HCC): Status: ACTIVE | Noted: 2020-03-25

## 2022-03-20 PROBLEM — N40.0 BENIGN PROSTATIC HYPERPLASIA: Status: ACTIVE | Noted: 2021-03-16

## 2022-03-21 ENCOUNTER — APPOINTMENT (OUTPATIENT)
Dept: CARDIAC REHAB | Age: 52
End: 2022-03-21

## 2022-03-21 ENCOUNTER — TELEPHONE (OUTPATIENT)
Dept: PRIMARY CARE CLINIC | Age: 52
End: 2022-03-21

## 2022-03-21 ENCOUNTER — OFFICE VISIT (OUTPATIENT)
Dept: PRIMARY CARE CLINIC | Age: 52
End: 2022-03-21
Payer: COMMERCIAL

## 2022-03-21 VITALS
SYSTOLIC BLOOD PRESSURE: 141 MMHG | HEART RATE: 94 BPM | HEIGHT: 75 IN | BODY MASS INDEX: 31.21 KG/M2 | DIASTOLIC BLOOD PRESSURE: 88 MMHG | RESPIRATION RATE: 18 BRPM | WEIGHT: 251 LBS | OXYGEN SATURATION: 96 % | TEMPERATURE: 98.4 F

## 2022-03-21 DIAGNOSIS — M48.00 SPINAL STENOSIS, UNSPECIFIED SPINAL REGION: ICD-10-CM

## 2022-03-21 DIAGNOSIS — I50.22 CHRONIC SYSTOLIC CONGESTIVE HEART FAILURE (HCC): ICD-10-CM

## 2022-03-21 DIAGNOSIS — I10 ESSENTIAL HYPERTENSION: ICD-10-CM

## 2022-03-21 DIAGNOSIS — N40.0 BENIGN PROSTATIC HYPERPLASIA WITHOUT LOWER URINARY TRACT SYMPTOMS: ICD-10-CM

## 2022-03-21 DIAGNOSIS — D64.9 ANEMIA, UNSPECIFIED TYPE: ICD-10-CM

## 2022-03-21 DIAGNOSIS — J01.00 ACUTE NON-RECURRENT MAXILLARY SINUSITIS: ICD-10-CM

## 2022-03-21 DIAGNOSIS — E27.40 ALDOSTERONE DEFICIENCY (HCC): ICD-10-CM

## 2022-03-21 DIAGNOSIS — E78.2 MIXED HYPERLIPIDEMIA: ICD-10-CM

## 2022-03-21 DIAGNOSIS — E66.09 CLASS 1 OBESITY DUE TO EXCESS CALORIES WITH SERIOUS COMORBIDITY AND BODY MASS INDEX (BMI) OF 31.0 TO 31.9 IN ADULT: ICD-10-CM

## 2022-03-21 DIAGNOSIS — I25.10 CAD, MULTIPLE VESSEL: ICD-10-CM

## 2022-03-21 DIAGNOSIS — K40.90 LEFT INGUINAL HERNIA: ICD-10-CM

## 2022-03-21 DIAGNOSIS — N18.4 CKD (CHRONIC KIDNEY DISEASE) STAGE 4, GFR 15-29 ML/MIN (HCC): ICD-10-CM

## 2022-03-21 DIAGNOSIS — E11.65 TYPE 2 DIABETES MELLITUS WITH HYPERGLYCEMIA, WITHOUT LONG-TERM CURRENT USE OF INSULIN (HCC): Primary | ICD-10-CM

## 2022-03-21 LAB — HBA1C MFR BLD HPLC: 14 %

## 2022-03-21 PROCEDURE — 99214 OFFICE O/P EST MOD 30 MIN: CPT | Performed by: NURSE PRACTITIONER

## 2022-03-21 PROCEDURE — 3046F HEMOGLOBIN A1C LEVEL >9.0%: CPT | Performed by: NURSE PRACTITIONER

## 2022-03-21 PROCEDURE — 83036 HEMOGLOBIN GLYCOSYLATED A1C: CPT | Performed by: NURSE PRACTITIONER

## 2022-03-21 RX ORDER — DOXYCYCLINE 100 MG/1
100 TABLET ORAL 2 TIMES DAILY
Qty: 20 TABLET | Refills: 0 | Status: SHIPPED | OUTPATIENT
Start: 2022-03-21 | End: 2022-03-31

## 2022-03-21 RX ORDER — INSULIN LISPRO 100 [IU]/ML
INJECTION, SOLUTION INTRAVENOUS; SUBCUTANEOUS
Qty: 4050 EACH | Refills: 0 | Status: SHIPPED | OUTPATIENT
Start: 2022-03-21 | End: 2022-03-24 | Stop reason: ALTCHOICE

## 2022-03-21 NOTE — PROGRESS NOTES
Aristides Calles is a 54 y.o. male who presents to the office today for the following:    Chief Complaint   Patient presents with    Diabetes     f/u     Cold Symptoms       Past Medical History:   Diagnosis Date    Abdominal hernia 1/29/2020    Benign prostatic hyperplasia 3/16/2021    Chronic kidney disease     congenital polycytic kidneys    Congenital polycystic kidney 3/16/2021    Congestive heart failure ([address] 3/25/2020    Coronary arteriosclerosis 3/25/2020    Heart attack ([facility] [address] 3/18/2020 and 06/30/2020    History of COVID-19 3/28/2021    Hypertension     Ill-defined condition     BPH, prostatitis    Ill-defined condition     hernia    Ill-defined condition     ischemic cardiomyopathy    Ill-defined condition     (r) knee pain    Inguinal hernia     Morbid obesity ([address]     Prediabetes 3/28/2021    Spinal stenosis        Past Surgical History:   Procedure Laterality Date    HX HERNIA REPAIR  2834    umbiblical hernia    MT CARDIAC SURG PROCEDURE UNLIST  06/30/2020    Stent placement        Family History   Problem Relation Age of Onset    Hypertension Mother     Diabetes Mother     Kidney Disease Mother     Heart Disease Father     Hypertension Sister     Diabetes Brother     Kidney Disease Brother     No Known Problems Sister         Social History     Tobacco Use    Smoking status: Never Smoker    Smokeless tobacco: Never Used   Vaping Use    Vaping Use: Never used   Substance Use Topics    Alcohol use: Not Currently    Drug use: Never      HPI  Patient  with PMH of CAD w/ stents, hyperlipidemia, hypertension, CHF, BPH, prediabetes, CKD, covid 19,chronic back pain, hernia and obesity who is here for follow up of chronic conditions. States that he is taking medications as directed. Follows with cardiology and nephrology. Also has recently seen surgeon about hernia.  Reports that sugars have been doing better in the last couple of weeks but had been fluctuating up in the 350-400 range. Admits that he had not been monitoring his diet as closely and was eating more carbs. Since changing diet again, feels that his morning sugars have been under 200 when checking. Is complaining of increased sinus congestion, post nasal drip and cough which hae been worsening in the past few weeks. Feels that he has had a cough since having covid but now is more productive. Denies any recent fever or increased sob. Current Outpatient Medications on File Prior to Visit   Medication Sig    insulin glargine (LANTUS,BASAGLAR) 100 unit/mL (3 mL) inpn 40 Units by SubCUTAneous route daily.  carvediloL (COREG) 12.5 mg tablet Take 3 Tablets by mouth two (2) times daily (with meals).  furosemide (LASIX) 40 mg tablet Take 40 mg by mouth daily.  furosemide (LASIX) 20 mg tablet Take 20 mg by mouth daily. 1200    Lantus Solostar U-100 Insulin 100 unit/mL (3 mL) inpn ADMINISTER 30 UNITS UNDER THE SKIN DAILY    aspirin delayed-release 81 mg tablet TAKE 1 TABLET BY MOUTH DAILY    atorvastatin (LIPITOR) 20 mg tablet TAKE 1 TABLET BY MOUTH DAILY    ezetimibe (ZETIA) 10 mg tablet TAKE 1 TABLET BY MOUTH DAILY    hydrALAZINE (APRESOLINE) 10 mg tablet Take 20 mg by mouth three (3) times daily.  isosorbide mononitrate ER (IMDUR) 30 mg tablet TAKE 1 TABLET BY MOUTH DAILY    potassium chloride SR (KLOR-CON 10) 10 mEq tablet 20 mEq.  prasugreL (EFFIENT) 10 mg tablet TAKE 1 TABLET BY MOUTH DAILY    tamsulosin (FLOMAX) 0.4 mg capsule TAKE 1 CAPSULE BY MOUTH DAILY    HYDROcodone-acetaminophen (NORCO) 5-325 mg per tablet Dr Baron Jj nitroglycerin (NITROSTAT) 0.4 mg SL tablet ONE TABLET UNDER TONGUE AS NEEDED FOR CHEST PAIN     No current facility-administered medications on file prior to visit. Medications Ordered Today   Medications    doxycycline (ADOXA) 100 mg tablet     Sig: Take 1 Tablet by mouth two (2) times a day for 10 days.      Dispense:  20 Tablet     Refill:  0    DISCONTD: insulin lispro (HUMALOG) 100 unit/mL kwikpen     Sig: Inject insulin subcutaneously three times daily before meals per sliding scale. If blood sugar < 150- 0 units, if 150-199- 3 units, if 200-249-6 units, 250-299-9 units, 300-349-12 units, > 350 15 units and contact provider. Total daily units is 45. Dispense:  4258 Each     Refill:  0        Review of Systems   Constitutional: Negative. HENT: Positive for congestion and sinus pain. Negative for ear pain and sore throat. Eyes: Negative. Respiratory: Positive for cough and shortness of breath. Negative for hemoptysis, wheezing and stridor. Cardiovascular: Positive for leg swelling. Negative for chest pain, palpitations, claudication and PND. Gastrointestinal: Negative. Genitourinary: Negative. Negative for dysuria, frequency and urgency. Musculoskeletal: Positive for back pain and myalgias. Negative for falls. Neurological: Negative. Psychiatric/Behavioral: Negative. Visit Vitals  BP (!) 141/88 (BP 1 Location: Left arm, BP Patient Position: Sitting)   Pulse 94   Temp 98.4 °F (36.9 °C) (Oral)   Resp 18   Ht 6' 3\" (1.905 m)   Wt 251 lb (113.9 kg)   SpO2 96%   BMI 31.37 kg/m²       Physical Exam  Vitals and nursing note reviewed. Constitutional:       Appearance: Normal appearance. He is obese. HENT:      Right Ear: Tympanic membrane normal.      Left Ear: Tympanic membrane normal.      Nose: Congestion present. Mouth/Throat:      Mouth: Mucous membranes are moist.      Pharynx: Oropharynx is clear. Eyes:      Pupils: Pupils are equal, round, and reactive to light. Neck:      Vascular: No carotid bruit. Cardiovascular:      Rate and Rhythm: Normal rate and regular rhythm. Pulses: Normal pulses. Pulmonary:      Effort: Pulmonary effort is normal.      Breath sounds: Normal breath sounds. Abdominal:      General: Bowel sounds are normal.      Palpations: Abdomen is soft. Tenderness: There is no abdominal tenderness. Hernia: A hernia (left inguinal extending into scrotum) is present. Musculoskeletal:         General: Normal range of motion. Right lower leg: Edema (trace) present. Left lower leg: Edema (trace) present. Lymphadenopathy:      Cervical: No cervical adenopathy. Skin:     General: Skin is warm and dry. Neurological:      Mental Status: He is alert and oriented to person, place, and time. Mental status is at baseline. Psychiatric:         Mood and Affect: Mood normal.         Behavior: Behavior normal.            1. Chronic systolic congestive heart failure (HCC)   EF 20-25% on review of notes from [facility] 11/8/21  Euvolemic on exam and lasix was reduced with starting farxiga 10mg during hospital stay  Follows with Dr. Patrick Vasquez at [address] [place] [address] and will continue care with them    2. CAD, multiple vessel  Patient has h/o MI and 3 stents with last 2 placed in 6/2020  Remains on effient and asa  Follows with Dr. Leta Nicole at [address] [place] [address] cardiology and will continue care with them    3. Essential hypertension  Blood pressure is essentially controlled and continue medication as directed  Monitor at home and notify provider if > 140/90    4. Benign prostatic hyperplasia without lower urinary tract symptoms  No current symptoms and is on flomax    5.  Type 2 diabetes mellitus with hyperglycemia, without long-term current use of insulin ([facility])  Lab Results   Component Value Date/Time    Hemoglobin A1c (POC) 14.0 03/21/2022 02:23 PM     A1c 11/8/21 > 14 at [address] [place] [address] with prior 6.5 in 7/2021  Did tolerate farxiga or Pat Comment previously  Was reporting fasting sugars under 200 last visit but today A1c remains high  No polyuria or polydipsia sx  Has not been monitoring diet as closely but did start reducing his carbs again in the past couple of weeks which he feels has helped sugars again    Increase lantus to 40 units nightly  Add sliding scale humalog as directed prior to meals  Check sugars 2-3 times daily and bring meter to next appointment  Counseled dietary changes that I feel will be beneficial as well as encourage regular exercise 3-5 times weekly  Encourage diabetic eye and foot exams    6. Spinal stenosis, unspecified spinal region  Symptoms reported as stable and is on chronic pain medication  Follows with pain specialist in [place]    7. Class 1 obesity due to excess calories with serious comorbidity and body mass index (BMI) of 31.0 to 31.9 in adult  Continue to encourage weight loss. Recommend decreasing excess fat, salt and sugar in diet along with getting regular exercise 3-5 times weekly     8. Mixed hyperlipidemia  ontinues on atorvastatin 40mg as directed (highest dose tolerated). 9. Left inguinal hernia  Has discomfort at site still and has to manually reduce  Was referred to Dr. Lesia Gore who has recommended repair but  Will have to have cardiac clearance from Dr. Pat Nova    10. Stage 4 chronic kidney disease ([facility] [address]  Lab Results   Component Value Date/Time    Creatinine 3.66 (H) 11/16/2021 04:14 PM   Has been referred to nephrology and will request copy of notes and labs    11. Aldosterone deficiency ([facility] [address]  Was recommended by endocrinology at [facility] to have further work up for potential aldosterone insufficiency but has still not contacted to schedule with endocrinology    13. Anemia  Lab Results   Component Value Date/Time    WBC 9.1 11/16/2021 04:14 PM    HGB 12.2 (L) 11/16/2021 04:14 PM    HCT 38.6 11/16/2021 04:14 PM    PLATELET 259 15/00/8496 04:14 PM    MCV 81 11/16/2021 04:14 PM     Lab Results   Component Value Date/Time    Ferritin 41 11/16/2021 04:14 PM       13. Acute non-recurrent maxillary sinusitis  Will treat for sinusitis w/ antibiotic due to prolonged symptoms. Recommended using OTC mucinex to help clear congestion. Supportive care encouraged including rest, increased oral fluids, cool mist humidifier and Tylenol prn.    Advised to follow up for persistent or worsening symptoms  - doxycycline (ADOXA) 100 mg tablet; Take 1 Tablet by mouth two (2) times a day for 10 days. Dispense: 20 Tablet; Refill: 0      Patient/wife verbalizes understanding of plan of care as discussed above    Follow-up and Dispositions    · Return in about 2 weeks (around 4/4/2022) for or sooner for worsening symptoms.

## 2022-03-21 NOTE — PROGRESS NOTES
Chief Complaint   Patient presents with    Diabetes     f/u     Cold Symptoms     non productive cough since Covid-19     1. Have you been to the ER, urgent care clinic since your last visit? Hospitalized since your last visit? No    2. Have you seen or consulted any other health care providers outside of the [address] [place] [address] since your last visit? Include any pap smears or colon screening. No   Visit Vitals  BP (!) 141/88 (BP 1 Location: Left arm, BP Patient Position: Sitting)   Pulse 94   Temp 98.4 °F (36.9 °C) (Oral)   Resp 18   Ht 6' 3\" (1.905 m)   Wt 251 lb (113.9 kg)   SpO2 96%   BMI 31.37 kg/m²       refused colonoscopy at this time. refused Covid, flu and pnemo as well.

## 2022-03-23 ENCOUNTER — APPOINTMENT (OUTPATIENT)
Dept: CARDIAC REHAB | Age: 52
End: 2022-03-23

## 2022-03-24 ENCOUNTER — TELEPHONE (OUTPATIENT)
Dept: PRIMARY CARE CLINIC | Age: 52
End: 2022-03-24

## 2022-03-24 DIAGNOSIS — E11.65 TYPE 2 DIABETES MELLITUS WITH HYPERGLYCEMIA, WITHOUT LONG-TERM CURRENT USE OF INSULIN (HCC): Primary | ICD-10-CM

## 2022-03-24 PROCEDURE — 3046F HEMOGLOBIN A1C LEVEL >9.0%: CPT | Performed by: NURSE PRACTITIONER

## 2022-03-24 RX ORDER — INSULIN ASPART 100 [IU]/ML
INJECTION, SUSPENSION SUBCUTANEOUS
Qty: 15 ADJUSTABLE DOSE PRE-FILLED PEN SYRINGE | Refills: 1 | OUTPATIENT
Start: 2022-03-24

## 2022-03-24 RX ORDER — INSULIN ASPART 100 [IU]/ML
INJECTION, SOLUTION INTRAVENOUS; SUBCUTANEOUS
Qty: 45 ML | Refills: 1 | Status: SHIPPED | OUTPATIENT
Start: 2022-03-24 | End: 2022-04-09

## 2022-03-24 NOTE — TELEPHONE ENCOUNTER
Received notification from pharmacy that insurance will not pay for Lispro(humalog). They will however pay for Novolog according to [employer] . Can you please send. Thank you. How Severe Are Your Lesions?: mild Is This A New Presentation, Or A Follow-Up?: Follow Up Sebaceous Hyperplasia

## 2022-03-27 RX ORDER — INSULIN GLARGINE 100 [IU]/ML
40 INJECTION, SOLUTION SUBCUTANEOUS DAILY
COMMUNITY
End: 2022-04-09

## 2022-03-28 ENCOUNTER — TELEPHONE (OUTPATIENT)
Dept: PRIMARY CARE CLINIC | Age: 52
End: 2022-03-28

## 2022-03-28 ENCOUNTER — APPOINTMENT (OUTPATIENT)
Dept: CARDIAC REHAB | Age: 52
End: 2022-03-28

## 2022-03-28 NOTE — TELEPHONE ENCOUNTER
----- Message from Alisa Roman NP sent at 3/27/2022  8:18 PM EDT -----  Please call patient and find out when he had nephrology appointment and labs so we can get copy.  Looking for some more recent than 11/2021

## 2022-04-06 DIAGNOSIS — N18.4 CKD (CHRONIC KIDNEY DISEASE) STAGE 4, GFR 15-29 ML/MIN (HCC): Primary | ICD-10-CM

## 2022-04-08 ENCOUNTER — APPOINTMENT (OUTPATIENT)
Dept: GENERAL RADIOLOGY | Age: 52
End: 2022-04-08
Attending: EMERGENCY MEDICINE
Payer: COMMERCIAL

## 2022-04-08 ENCOUNTER — HOSPITAL ENCOUNTER (EMERGENCY)
Age: 52
Discharge: ANOTHER HOSPITAL | End: 2022-04-08
Attending: EMERGENCY MEDICINE
Payer: COMMERCIAL

## 2022-04-08 ENCOUNTER — HOSPITAL ENCOUNTER (INPATIENT)
Age: 52
LOS: 1 days | Discharge: HOME | DRG: 247 | End: 2022-04-09
Attending: EMERGENCY MEDICINE | Admitting: INTERNAL MEDICINE
Payer: COMMERCIAL

## 2022-04-08 VITALS
SYSTOLIC BLOOD PRESSURE: 118 MMHG | HEART RATE: 73 BPM | DIASTOLIC BLOOD PRESSURE: 95 MMHG | OXYGEN SATURATION: 96 % | TEMPERATURE: 97.8 F | RESPIRATION RATE: 19 BRPM

## 2022-04-08 DIAGNOSIS — E87.70 HYPERVOLEMIA, UNSPECIFIED HYPERVOLEMIA TYPE: ICD-10-CM

## 2022-04-08 DIAGNOSIS — R60.9 PERIPHERAL EDEMA: ICD-10-CM

## 2022-04-08 DIAGNOSIS — R77.8 ELEVATED TROPONIN: ICD-10-CM

## 2022-04-08 DIAGNOSIS — I20.0 UNSTABLE ANGINA (HCC): ICD-10-CM

## 2022-04-08 DIAGNOSIS — I24.9 ACS (ACUTE CORONARY SYNDROME) (HCC): Primary | ICD-10-CM

## 2022-04-08 DIAGNOSIS — R07.9 ACUTE CHEST PAIN: Primary | ICD-10-CM

## 2022-04-08 DIAGNOSIS — N28.9 ACUTE RENAL INSUFFICIENCY: ICD-10-CM

## 2022-04-08 LAB
ALBUMIN SERPL-MCNC: 2.6 G/DL (ref 3.5–5)
ALBUMIN/GLOB SERPL: 0.7 {RATIO} (ref 1.1–2.2)
ALP SERPL-CCNC: 65 U/L (ref 45–117)
ALT SERPL-CCNC: 27 U/L (ref 12–78)
ANION GAP SERPL CALC-SCNC: 8 MMOL/L (ref 5–15)
APTT PPP: 29.4 SEC (ref 21.2–34.1)
APTT PPP: 41.8 SEC (ref 21.2–34.1)
APTT PPP: 58.4 SEC (ref 21.2–34.1)
AST SERPL W P-5'-P-CCNC: 18 U/L (ref 15–37)
ATRIAL RATE: 80 BPM
BASOPHILS # BLD: 0 K/UL (ref 0–0.1)
BASOPHILS # BLD: 0.1 K/UL (ref 0–0.2)
BASOPHILS NFR BLD: 0 % (ref 0–1)
BASOPHILS NFR BLD: 1 % (ref 0–2.5)
BILIRUB SERPL-MCNC: 0.7 MG/DL (ref 0.2–1)
BUN SERPL-MCNC: 53 MG/DL (ref 6–20)
BUN/CREAT SERPL: 14 (ref 12–20)
CA-I BLD-MCNC: 7.9 MG/DL (ref 8.5–10.1)
CALCULATED P AXIS, ECG09: 36 DEGREES
CALCULATED R AXIS, ECG10: 34 DEGREES
CALCULATED T AXIS, ECG11: -140 DEGREES
CHLORIDE SERPL-SCNC: 104 MMOL/L (ref 97–108)
CO2 SERPL-SCNC: 25 MMOL/L (ref 21–32)
CREAT SERPL-MCNC: 3.71 MG/DL (ref 0.7–1.3)
DIAGNOSIS, 93000: NORMAL
DIFFERENTIAL METHOD BLD: ABNORMAL
EOSINOPHIL # BLD: 0.1 K/UL (ref 0–0.4)
EOSINOPHIL # BLD: 0.1 K/UL (ref 0–0.7)
EOSINOPHIL NFR BLD: 1 % (ref 0.9–2.9)
EOSINOPHIL NFR BLD: 1 % (ref 0–7)
ERYTHROCYTE [DISTWIDTH] IN BLOOD BY AUTOMATED COUNT: 13.7 % (ref 11.5–14.5)
ERYTHROCYTE [DISTWIDTH] IN BLOOD BY AUTOMATED COUNT: 14.3 % (ref 11.5–14.5)
GLOBULIN SER CALC-MCNC: 3.7 G/DL (ref 2–4)
GLUCOSE BLD STRIP.AUTO-MCNC: 184 MG/DL (ref 65–117)
GLUCOSE BLD STRIP.AUTO-MCNC: 194 MG/DL (ref 65–117)
GLUCOSE BLD STRIP.AUTO-MCNC: 273 MG/DL (ref 65–117)
GLUCOSE SERPL-MCNC: 294 MG/DL (ref 65–100)
HCT VFR BLD AUTO: 35.1 % (ref 41–53)
HCT VFR BLD AUTO: 38.8 % (ref 36.6–50.3)
HGB BLD-MCNC: 10.9 G/DL (ref 13.5–17.5)
HGB BLD-MCNC: 12 G/DL (ref 12.1–17)
IMM GRANULOCYTES # BLD AUTO: 0 K/UL (ref 0–0.04)
IMM GRANULOCYTES NFR BLD AUTO: 0 % (ref 0–0.5)
LYMPHOCYTES # BLD: 0.7 K/UL (ref 1–4.8)
LYMPHOCYTES # BLD: 1 K/UL (ref 0.8–3.5)
LYMPHOCYTES NFR BLD: 11 % (ref 12–49)
LYMPHOCYTES NFR BLD: 8 % (ref 20.5–51.1)
MCH RBC QN AUTO: 23.5 PG (ref 31–34)
MCH RBC QN AUTO: 24.4 PG (ref 26–34)
MCHC RBC AUTO-ENTMCNC: 30.9 G/DL (ref 30–36.5)
MCHC RBC AUTO-ENTMCNC: 30.9 G/DL (ref 31–36)
MCV RBC AUTO: 76.1 FL (ref 80–100)
MCV RBC AUTO: 79 FL (ref 80–99)
MONOCYTES # BLD: 0.7 K/UL (ref 0.2–2.4)
MONOCYTES # BLD: 0.7 K/UL (ref 0–1)
MONOCYTES NFR BLD: 8 % (ref 1.7–9.3)
MONOCYTES NFR BLD: 8 % (ref 5–13)
NEUTS SEG # BLD: 6.9 K/UL (ref 1.8–8)
NEUTS SEG # BLD: 7.2 K/UL (ref 1.8–7.7)
NEUTS SEG NFR BLD: 80 % (ref 32–75)
NEUTS SEG NFR BLD: 82 % (ref 42–75)
NRBC # BLD: 0 K/UL
NRBC # BLD: 0 K/UL (ref 0–0.01)
NRBC BLD-RTO: 0 PER 100 WBC
NRBC BLD-RTO: 0 PER 100 WBC
P-R INTERVAL, ECG05: 197 MS
PERFORMED BY, TECHID: ABNORMAL
PLATELET # BLD AUTO: 184 K/UL (ref 150–400)
PLATELET # BLD AUTO: 254 K/UL (ref 150–400)
PMV BLD AUTO: 12.5 FL (ref 8.9–12.9)
PMV BLD AUTO: 9.3 FL (ref 6.5–11.5)
POTASSIUM SERPL-SCNC: 4 MMOL/L (ref 3.5–5.1)
PROT SERPL-MCNC: 6.3 G/DL (ref 6.4–8.2)
Q-T INTERVAL, ECG07: 387 MS
QRS DURATION, ECG06: 126 MS
QTC CALCULATION (BEZET), ECG08: 450 MS
RBC # BLD AUTO: 4.61 M/UL (ref 4.5–5.9)
RBC # BLD AUTO: 4.91 M/UL (ref 4.1–5.7)
SODIUM SERPL-SCNC: 137 MMOL/L (ref 136–145)
THERAPEUTIC RANGE,PTTT: ABNORMAL SEC
THERAPEUTIC RANGE,PTTT: ABNORMAL SEC (ref 82–109)
THERAPEUTIC RANGE,PTTT: NORMAL SEC (ref 82–109)
TROPONIN-HIGH SENSITIVITY: 110 NG/L (ref 0–76)
TROPONIN-HIGH SENSITIVITY: 118 NG/L (ref 0–76)
TROPONIN-HIGH SENSITIVITY: 171 NG/L (ref 0–76)
TROPONIN-HIGH SENSITIVITY: 406 NG/L (ref 0–76)
VENTRICULAR RATE, ECG03: 81 BPM
WBC # BLD AUTO: 8.6 K/UL (ref 4.1–11.1)
WBC # BLD AUTO: 8.7 K/UL (ref 4.4–11.3)

## 2022-04-08 PROCEDURE — C1887 CATHETER, GUIDING: HCPCS | Performed by: STUDENT IN AN ORGANIZED HEALTH CARE EDUCATION/TRAINING PROGRAM

## 2022-04-08 PROCEDURE — 96361 HYDRATE IV INFUSION ADD-ON: CPT

## 2022-04-08 PROCEDURE — 77030029997 HC DEV COM RDL R BND TELE -B: Performed by: STUDENT IN AN ORGANIZED HEALTH CARE EDUCATION/TRAINING PROGRAM

## 2022-04-08 PROCEDURE — 85730 THROMBOPLASTIN TIME PARTIAL: CPT

## 2022-04-08 PROCEDURE — 74011250637 HC RX REV CODE- 250/637: Performed by: STUDENT IN AN ORGANIZED HEALTH CARE EDUCATION/TRAINING PROGRAM

## 2022-04-08 PROCEDURE — 74011250637 HC RX REV CODE- 250/637: Performed by: INTERNAL MEDICINE

## 2022-04-08 PROCEDURE — 96374 THER/PROPH/DIAG INJ IV PUSH: CPT

## 2022-04-08 PROCEDURE — 85347 COAGULATION TIME ACTIVATED: CPT

## 2022-04-08 PROCEDURE — 99152 MOD SED SAME PHYS/QHP 5/>YRS: CPT | Performed by: STUDENT IN AN ORGANIZED HEALTH CARE EDUCATION/TRAINING PROGRAM

## 2022-04-08 PROCEDURE — C1725 CATH, TRANSLUMIN NON-LASER: HCPCS | Performed by: STUDENT IN AN ORGANIZED HEALTH CARE EDUCATION/TRAINING PROGRAM

## 2022-04-08 PROCEDURE — C1874 STENT, COATED/COV W/DEL SYS: HCPCS | Performed by: STUDENT IN AN ORGANIZED HEALTH CARE EDUCATION/TRAINING PROGRAM

## 2022-04-08 PROCEDURE — 74011000250 HC RX REV CODE- 250: Performed by: INTERNAL MEDICINE

## 2022-04-08 PROCEDURE — 74011250636 HC RX REV CODE- 250/636: Performed by: EMERGENCY MEDICINE

## 2022-04-08 PROCEDURE — C1769 GUIDE WIRE: HCPCS | Performed by: STUDENT IN AN ORGANIZED HEALTH CARE EDUCATION/TRAINING PROGRAM

## 2022-04-08 PROCEDURE — 93458 L HRT ARTERY/VENTRICLE ANGIO: CPT | Performed by: STUDENT IN AN ORGANIZED HEALTH CARE EDUCATION/TRAINING PROGRAM

## 2022-04-08 PROCEDURE — 36415 COLL VENOUS BLD VENIPUNCTURE: CPT

## 2022-04-08 PROCEDURE — 74011000250 HC RX REV CODE- 250: Performed by: STUDENT IN AN ORGANIZED HEALTH CARE EDUCATION/TRAINING PROGRAM

## 2022-04-08 PROCEDURE — 93005 ELECTROCARDIOGRAM TRACING: CPT

## 2022-04-08 PROCEDURE — 99285 EMERGENCY DEPT VISIT HI MDM: CPT

## 2022-04-08 PROCEDURE — 99153 MOD SED SAME PHYS/QHP EA: CPT | Performed by: STUDENT IN AN ORGANIZED HEALTH CARE EDUCATION/TRAINING PROGRAM

## 2022-04-08 PROCEDURE — 96375 TX/PRO/DX INJ NEW DRUG ADDON: CPT

## 2022-04-08 PROCEDURE — 71045 X-RAY EXAM CHEST 1 VIEW: CPT

## 2022-04-08 PROCEDURE — 74011000250 HC RX REV CODE- 250: Performed by: EMERGENCY MEDICINE

## 2022-04-08 PROCEDURE — 74011000636 HC RX REV CODE- 636: Performed by: STUDENT IN AN ORGANIZED HEALTH CARE EDUCATION/TRAINING PROGRAM

## 2022-04-08 PROCEDURE — 77030008542 HC TBNG MON PRSS EDWD -A: Performed by: STUDENT IN AN ORGANIZED HEALTH CARE EDUCATION/TRAINING PROGRAM

## 2022-04-08 PROCEDURE — 85025 COMPLETE CBC W/AUTO DIFF WBC: CPT

## 2022-04-08 PROCEDURE — 84484 ASSAY OF TROPONIN QUANT: CPT

## 2022-04-08 PROCEDURE — 74011250636 HC RX REV CODE- 250/636: Performed by: STUDENT IN AN ORGANIZED HEALTH CARE EDUCATION/TRAINING PROGRAM

## 2022-04-08 PROCEDURE — 77030013516 HC DEV INFL ANGI MRTM -B: Performed by: STUDENT IN AN ORGANIZED HEALTH CARE EDUCATION/TRAINING PROGRAM

## 2022-04-08 PROCEDURE — 77030040934 HC CATH DIAG DXTERITY MEDT -A: Performed by: STUDENT IN AN ORGANIZED HEALTH CARE EDUCATION/TRAINING PROGRAM

## 2022-04-08 PROCEDURE — C1893 INTRO/SHEATH, FIXED,NON-PEEL: HCPCS | Performed by: STUDENT IN AN ORGANIZED HEALTH CARE EDUCATION/TRAINING PROGRAM

## 2022-04-08 PROCEDURE — 80053 COMPREHEN METABOLIC PANEL: CPT

## 2022-04-08 PROCEDURE — 74011250636 HC RX REV CODE- 250/636: Performed by: INTERNAL MEDICINE

## 2022-04-08 PROCEDURE — 65660000001 HC RM ICU INTERMED STEPDOWN

## 2022-04-08 PROCEDURE — 92928 PRQ TCAT PLMT NTRAC ST 1 LES: CPT | Performed by: STUDENT IN AN ORGANIZED HEALTH CARE EDUCATION/TRAINING PROGRAM

## 2022-04-08 PROCEDURE — 74011636637 HC RX REV CODE- 636/637: Performed by: INTERNAL MEDICINE

## 2022-04-08 PROCEDURE — 77030019698 HC SYR ANGI MDLON MRTM -A: Performed by: STUDENT IN AN ORGANIZED HEALTH CARE EDUCATION/TRAINING PROGRAM

## 2022-04-08 PROCEDURE — 96376 TX/PRO/DX INJ SAME DRUG ADON: CPT

## 2022-04-08 PROCEDURE — 77030025703 HC SYR ANGI VACLOK MRTM -A: Performed by: STUDENT IN AN ORGANIZED HEALTH CARE EDUCATION/TRAINING PROGRAM

## 2022-04-08 PROCEDURE — 76210000017 HC OR PH I REC 1.5 TO 2 HR: Performed by: STUDENT IN AN ORGANIZED HEALTH CARE EDUCATION/TRAINING PROGRAM

## 2022-04-08 PROCEDURE — 4A023N7 MEASUREMENT OF CARDIAC SAMPLING AND PRESSURE, LEFT HEART, PERCUTANEOUS APPROACH: ICD-10-PCS | Performed by: STUDENT IN AN ORGANIZED HEALTH CARE EDUCATION/TRAINING PROGRAM

## 2022-04-08 PROCEDURE — 74011250637 HC RX REV CODE- 250/637: Performed by: EMERGENCY MEDICINE

## 2022-04-08 PROCEDURE — B2111ZZ FLUOROSCOPY OF MULTIPLE CORONARY ARTERIES USING LOW OSMOLAR CONTRAST: ICD-10-PCS | Performed by: STUDENT IN AN ORGANIZED HEALTH CARE EDUCATION/TRAINING PROGRAM

## 2022-04-08 PROCEDURE — 027034Z DILATION OF CORONARY ARTERY, ONE ARTERY WITH DRUG-ELUTING INTRALUMINAL DEVICE, PERCUTANEOUS APPROACH: ICD-10-PCS | Performed by: STUDENT IN AN ORGANIZED HEALTH CARE EDUCATION/TRAINING PROGRAM

## 2022-04-08 PROCEDURE — 82962 GLUCOSE BLOOD TEST: CPT

## 2022-04-08 DEVICE — IMPLANTABLE DEVICE: Type: IMPLANTABLE DEVICE | Status: FUNCTIONAL

## 2022-04-08 RX ORDER — ACETAMINOPHEN 650 MG/1
650 SUPPOSITORY RECTAL
Status: DISCONTINUED | OUTPATIENT
Start: 2022-04-08 | End: 2022-04-09 | Stop reason: HOSPADM

## 2022-04-08 RX ORDER — HEPARIN SODIUM 1000 [USP'U]/ML
4000 INJECTION, SOLUTION INTRAVENOUS; SUBCUTANEOUS ONCE
Status: COMPLETED | OUTPATIENT
Start: 2022-04-08 | End: 2022-04-08

## 2022-04-08 RX ORDER — HYDROCODONE BITARTRATE AND ACETAMINOPHEN 5; 325 MG/1; MG/1
1 TABLET ORAL
Status: DISCONTINUED | OUTPATIENT
Start: 2022-04-08 | End: 2022-04-09 | Stop reason: HOSPADM

## 2022-04-08 RX ORDER — ASPIRIN 81 MG/1
81 TABLET ORAL DAILY
Status: DISCONTINUED | OUTPATIENT
Start: 2022-04-08 | End: 2022-04-09 | Stop reason: HOSPADM

## 2022-04-08 RX ORDER — NITROGLYCERIN 20 MG/100ML
0-200 INJECTION INTRAVENOUS
Status: DISCONTINUED | OUTPATIENT
Start: 2022-04-08 | End: 2022-04-08

## 2022-04-08 RX ORDER — NITROGLYCERIN 20 MG/100ML
0-20 INJECTION INTRAVENOUS
Status: DISCONTINUED | OUTPATIENT
Start: 2022-04-08 | End: 2022-04-08 | Stop reason: HOSPADM

## 2022-04-08 RX ORDER — LIDOCAINE HYDROCHLORIDE 10 MG/ML
INJECTION INFILTRATION; PERINEURAL AS NEEDED
Status: DISCONTINUED | OUTPATIENT
Start: 2022-04-08 | End: 2022-04-08 | Stop reason: HOSPADM

## 2022-04-08 RX ORDER — ONDANSETRON 2 MG/ML
4 INJECTION INTRAMUSCULAR; INTRAVENOUS ONCE
Status: COMPLETED | OUTPATIENT
Start: 2022-04-08 | End: 2022-04-08

## 2022-04-08 RX ORDER — NITROGLYCERIN 0.4 MG/1
0.4 TABLET SUBLINGUAL AS NEEDED
Status: DISCONTINUED | OUTPATIENT
Start: 2022-04-08 | End: 2022-04-09 | Stop reason: HOSPADM

## 2022-04-08 RX ORDER — HYDRALAZINE HYDROCHLORIDE 10 MG/1
20 TABLET, FILM COATED ORAL 3 TIMES DAILY
Status: DISCONTINUED | OUTPATIENT
Start: 2022-04-08 | End: 2022-04-09 | Stop reason: HOSPADM

## 2022-04-08 RX ORDER — HEPARIN SODIUM 1000 [USP'U]/ML
2000 INJECTION, SOLUTION INTRAVENOUS; SUBCUTANEOUS AS NEEDED
Status: DISCONTINUED | OUTPATIENT
Start: 2022-04-08 | End: 2022-04-09 | Stop reason: HOSPADM

## 2022-04-08 RX ORDER — ONDANSETRON 4 MG/1
4 TABLET, ORALLY DISINTEGRATING ORAL
Status: DISCONTINUED | OUTPATIENT
Start: 2022-04-08 | End: 2022-04-09 | Stop reason: HOSPADM

## 2022-04-08 RX ORDER — SODIUM CHLORIDE 9 MG/ML
75 INJECTION, SOLUTION INTRAVENOUS ONCE
Status: COMPLETED | OUTPATIENT
Start: 2022-04-08 | End: 2022-04-08

## 2022-04-08 RX ORDER — ISOSORBIDE MONONITRATE 30 MG/1
30 TABLET, EXTENDED RELEASE ORAL DAILY
Status: DISCONTINUED | OUTPATIENT
Start: 2022-04-08 | End: 2022-04-09 | Stop reason: HOSPADM

## 2022-04-08 RX ORDER — FUROSEMIDE 40 MG/1
20 TABLET ORAL DAILY
Status: DISCONTINUED | OUTPATIENT
Start: 2022-04-08 | End: 2022-04-08

## 2022-04-08 RX ORDER — HEPARIN SODIUM 10000 [USP'U]/100ML
8-25 INJECTION, SOLUTION INTRAVENOUS
Status: DISCONTINUED | OUTPATIENT
Start: 2022-04-08 | End: 2022-04-09 | Stop reason: HOSPADM

## 2022-04-08 RX ORDER — NITROGLYCERIN 0.4 MG/1
0.4 TABLET SUBLINGUAL
Status: COMPLETED | OUTPATIENT
Start: 2022-04-08 | End: 2022-04-08

## 2022-04-08 RX ORDER — VERAPAMIL HYDROCHLORIDE 2.5 MG/ML
INJECTION, SOLUTION INTRAVENOUS AS NEEDED
Status: DISCONTINUED | OUTPATIENT
Start: 2022-04-08 | End: 2022-04-08 | Stop reason: HOSPADM

## 2022-04-08 RX ORDER — HYDROCODONE BITARTRATE AND ACETAMINOPHEN 5; 325 MG/1; MG/1
1 TABLET ORAL
Status: DISCONTINUED | OUTPATIENT
Start: 2022-04-08 | End: 2022-04-08 | Stop reason: SDUPTHER

## 2022-04-08 RX ORDER — SODIUM CHLORIDE 0.9 % (FLUSH) 0.9 %
5-40 SYRINGE (ML) INJECTION AS NEEDED
Status: DISCONTINUED | OUTPATIENT
Start: 2022-04-08 | End: 2022-04-09 | Stop reason: HOSPADM

## 2022-04-08 RX ORDER — FENTANYL CITRATE 50 UG/ML
INJECTION, SOLUTION INTRAMUSCULAR; INTRAVENOUS AS NEEDED
Status: DISCONTINUED | OUTPATIENT
Start: 2022-04-08 | End: 2022-04-08 | Stop reason: HOSPADM

## 2022-04-08 RX ORDER — FUROSEMIDE 40 MG/1
40 TABLET ORAL DAILY
Status: DISCONTINUED | OUTPATIENT
Start: 2022-04-08 | End: 2022-04-08

## 2022-04-08 RX ORDER — ATORVASTATIN CALCIUM 20 MG/1
20 TABLET, FILM COATED ORAL
Status: DISCONTINUED | OUTPATIENT
Start: 2022-04-08 | End: 2022-04-08

## 2022-04-08 RX ORDER — DEXTROSE MONOHYDRATE 100 MG/ML
0-250 INJECTION, SOLUTION INTRAVENOUS AS NEEDED
Status: DISCONTINUED | OUTPATIENT
Start: 2022-04-08 | End: 2022-04-09 | Stop reason: HOSPADM

## 2022-04-08 RX ORDER — MORPHINE SULFATE 4 MG/ML
4 INJECTION INTRAVENOUS ONCE
Status: COMPLETED | OUTPATIENT
Start: 2022-04-08 | End: 2022-04-08

## 2022-04-08 RX ORDER — HEPARIN SODIUM 1000 [USP'U]/ML
4000 INJECTION, SOLUTION INTRAVENOUS; SUBCUTANEOUS AS NEEDED
Status: DISCONTINUED | OUTPATIENT
Start: 2022-04-08 | End: 2022-04-09 | Stop reason: HOSPADM

## 2022-04-08 RX ORDER — NITROGLYCERIN 5 MG/ML
INJECTION, SOLUTION INTRAVENOUS AS NEEDED
Status: DISCONTINUED | OUTPATIENT
Start: 2022-04-08 | End: 2022-04-08 | Stop reason: HOSPADM

## 2022-04-08 RX ORDER — MAGNESIUM SULFATE 100 %
4 CRYSTALS MISCELLANEOUS AS NEEDED
Status: DISCONTINUED | OUTPATIENT
Start: 2022-04-08 | End: 2022-04-09 | Stop reason: HOSPADM

## 2022-04-08 RX ORDER — INSULIN GLARGINE 100 [IU]/ML
30 INJECTION, SOLUTION SUBCUTANEOUS DAILY
Status: DISCONTINUED | OUTPATIENT
Start: 2022-04-08 | End: 2022-04-09 | Stop reason: HOSPADM

## 2022-04-08 RX ORDER — PRASUGREL 10 MG/1
TABLET, FILM COATED ORAL AS NEEDED
Status: DISCONTINUED | OUTPATIENT
Start: 2022-04-08 | End: 2022-04-08 | Stop reason: HOSPADM

## 2022-04-08 RX ORDER — PRASUGREL 10 MG/1
10 TABLET, FILM COATED ORAL DAILY
Status: DISCONTINUED | OUTPATIENT
Start: 2022-04-09 | End: 2022-04-09 | Stop reason: HOSPADM

## 2022-04-08 RX ORDER — ONDANSETRON 2 MG/ML
4 INJECTION INTRAMUSCULAR; INTRAVENOUS
Status: DISCONTINUED | OUTPATIENT
Start: 2022-04-08 | End: 2022-04-09 | Stop reason: HOSPADM

## 2022-04-08 RX ORDER — ATORVASTATIN CALCIUM 40 MG/1
40 TABLET, FILM COATED ORAL
Status: DISCONTINUED | OUTPATIENT
Start: 2022-04-08 | End: 2022-04-09 | Stop reason: HOSPADM

## 2022-04-08 RX ORDER — TAMSULOSIN HYDROCHLORIDE 0.4 MG/1
0.4 CAPSULE ORAL DAILY
Status: DISCONTINUED | OUTPATIENT
Start: 2022-04-08 | End: 2022-04-09 | Stop reason: HOSPADM

## 2022-04-08 RX ORDER — MIDAZOLAM HYDROCHLORIDE 1 MG/ML
INJECTION INTRAMUSCULAR; INTRAVENOUS AS NEEDED
Status: DISCONTINUED | OUTPATIENT
Start: 2022-04-08 | End: 2022-04-08 | Stop reason: HOSPADM

## 2022-04-08 RX ORDER — ACETAMINOPHEN 325 MG/1
650 TABLET ORAL
Status: DISCONTINUED | OUTPATIENT
Start: 2022-04-08 | End: 2022-04-09 | Stop reason: HOSPADM

## 2022-04-08 RX ORDER — CARVEDILOL 12.5 MG/1
37.5 TABLET ORAL 2 TIMES DAILY WITH MEALS
Status: DISCONTINUED | OUTPATIENT
Start: 2022-04-08 | End: 2022-04-09 | Stop reason: HOSPADM

## 2022-04-08 RX ORDER — POLYETHYLENE GLYCOL 3350 17 G/17G
17 POWDER, FOR SOLUTION ORAL DAILY PRN
Status: DISCONTINUED | OUTPATIENT
Start: 2022-04-08 | End: 2022-04-09 | Stop reason: HOSPADM

## 2022-04-08 RX ORDER — EZETIMIBE 10 MG/1
10 TABLET ORAL DAILY
Status: DISCONTINUED | OUTPATIENT
Start: 2022-04-08 | End: 2022-04-09 | Stop reason: HOSPADM

## 2022-04-08 RX ORDER — HEPARIN SODIUM 10000 [USP'U]/100ML
12-25 INJECTION, SOLUTION INTRAVENOUS
Status: DISCONTINUED | OUTPATIENT
Start: 2022-04-08 | End: 2022-04-08 | Stop reason: HOSPADM

## 2022-04-08 RX ORDER — HEPARIN SODIUM 1000 [USP'U]/ML
INJECTION, SOLUTION INTRAVENOUS; SUBCUTANEOUS AS NEEDED
Status: DISCONTINUED | OUTPATIENT
Start: 2022-04-08 | End: 2022-04-08 | Stop reason: HOSPADM

## 2022-04-08 RX ORDER — FUROSEMIDE 40 MG/1
80 TABLET ORAL DAILY
Status: DISCONTINUED | OUTPATIENT
Start: 2022-04-08 | End: 2022-04-09 | Stop reason: HOSPADM

## 2022-04-08 RX ORDER — INSULIN LISPRO 100 [IU]/ML
INJECTION, SOLUTION INTRAVENOUS; SUBCUTANEOUS
Status: DISCONTINUED | OUTPATIENT
Start: 2022-04-08 | End: 2022-04-09 | Stop reason: HOSPADM

## 2022-04-08 RX ORDER — FUROSEMIDE 40 MG/1
20 TABLET ORAL
Status: DISCONTINUED | OUTPATIENT
Start: 2022-04-08 | End: 2022-04-08 | Stop reason: SDUPTHER

## 2022-04-08 RX ORDER — FUROSEMIDE 40 MG/1
20 TABLET ORAL EVERY 24 HOURS
Status: DISCONTINUED | OUTPATIENT
Start: 2022-04-08 | End: 2022-04-09 | Stop reason: HOSPADM

## 2022-04-08 RX ORDER — SODIUM CHLORIDE 0.9 % (FLUSH) 0.9 %
5-40 SYRINGE (ML) INJECTION EVERY 8 HOURS
Status: DISCONTINUED | OUTPATIENT
Start: 2022-04-08 | End: 2022-04-09 | Stop reason: HOSPADM

## 2022-04-08 RX ORDER — FUROSEMIDE 10 MG/ML
INJECTION INTRAMUSCULAR; INTRAVENOUS AS NEEDED
Status: DISCONTINUED | OUTPATIENT
Start: 2022-04-08 | End: 2022-04-08 | Stop reason: HOSPADM

## 2022-04-08 RX ADMIN — SODIUM CHLORIDE, PRESERVATIVE FREE 10 ML: 5 INJECTION INTRAVENOUS at 11:31

## 2022-04-08 RX ADMIN — CARVEDILOL 37.5 MG: 12.5 TABLET, FILM COATED ORAL at 18:10

## 2022-04-08 RX ADMIN — ISOSORBIDE MONONITRATE 30 MG: 30 TABLET, EXTENDED RELEASE ORAL at 13:32

## 2022-04-08 RX ADMIN — ACETAMINOPHEN 650 MG: 325 TABLET ORAL at 20:52

## 2022-04-08 RX ADMIN — HEPARIN SODIUM AND DEXTROSE 8 UNITS/KG/HR: 10000; 5 INJECTION INTRAVENOUS at 06:38

## 2022-04-08 RX ADMIN — SODIUM CHLORIDE, PRESERVATIVE FREE 10 ML: 5 INJECTION INTRAVENOUS at 18:15

## 2022-04-08 RX ADMIN — HYDRALAZINE HYDROCHLORIDE 20 MG: 10 TABLET, FILM COATED ORAL at 18:10

## 2022-04-08 RX ADMIN — CARVEDILOL 37.5 MG: 12.5 TABLET, FILM COATED ORAL at 10:13

## 2022-04-08 RX ADMIN — HYDRALAZINE HYDROCHLORIDE 20 MG: 10 TABLET, FILM COATED ORAL at 20:52

## 2022-04-08 RX ADMIN — NITROGLYCERIN 10 MCG/MIN: 20 INJECTION INTRAVENOUS at 07:28

## 2022-04-08 RX ADMIN — ATORVASTATIN CALCIUM 40 MG: 40 TABLET, FILM COATED ORAL at 20:52

## 2022-04-08 RX ADMIN — SODIUM CHLORIDE 75 ML/HR: 9 INJECTION, SOLUTION INTRAVENOUS at 04:45

## 2022-04-08 RX ADMIN — NITROGLYCERIN 10 MCG/MIN: 20 INJECTION INTRAVENOUS at 09:02

## 2022-04-08 RX ADMIN — ASPIRIN 81 MG: 81 TABLET, COATED ORAL at 10:13

## 2022-04-08 RX ADMIN — MORPHINE SULFATE 4 MG: 4 INJECTION, SOLUTION INTRAMUSCULAR; INTRAVENOUS at 06:11

## 2022-04-08 RX ADMIN — INSULIN LISPRO 3 UNITS: 100 INJECTION, SOLUTION INTRAVENOUS; SUBCUTANEOUS at 21:01

## 2022-04-08 RX ADMIN — NITROGLYCERIN 5 MCG/MIN: 20 INJECTION INTRAVENOUS at 06:30

## 2022-04-08 RX ADMIN — NITROGLYCERIN 0.4 MG: 0.4 TABLET, ORALLY DISINTEGRATING SUBLINGUAL at 04:45

## 2022-04-08 RX ADMIN — MORPHINE SULFATE 4 MG: 4 INJECTION, SOLUTION INTRAMUSCULAR; INTRAVENOUS at 04:44

## 2022-04-08 RX ADMIN — TAMSULOSIN HYDROCHLORIDE 0.4 MG: 0.4 CAPSULE ORAL at 10:14

## 2022-04-08 RX ADMIN — HEPARIN SODIUM AND DEXTROSE 8 UNITS/KG/HR: 10000; 5 INJECTION INTRAVENOUS at 09:01

## 2022-04-08 RX ADMIN — HEPARIN SODIUM 2000 UNITS: 1000 INJECTION, SOLUTION INTRAVENOUS; SUBCUTANEOUS at 13:47

## 2022-04-08 RX ADMIN — FUROSEMIDE 80 MG: 40 TABLET ORAL at 10:13

## 2022-04-08 RX ADMIN — ONDANSETRON 4 MG: 2 INJECTION INTRAMUSCULAR; INTRAVENOUS at 04:44

## 2022-04-08 RX ADMIN — HEPARIN SODIUM 4000 UNITS: 1000 INJECTION INTRAVENOUS; SUBCUTANEOUS at 06:47

## 2022-04-08 RX ADMIN — EZETIMIBE 10 MG: 10 TABLET ORAL at 13:32

## 2022-04-08 NOTE — Clinical Note
Multiple views of the right coronary artery obtained using hand injection.  Post balloon inflation picture

## 2022-04-08 NOTE — ED NOTES
Patient arrived via [facility]. Placed on monitor. Resting in a position of comfort. Denies chest pain or shortness of breath at this time.  Awaiting new orders

## 2022-04-08 NOTE — ED NOTES
Dr. Jennifer Hinds asked that nitro be turned down to 5.  And that he would be having a cath procedure

## 2022-04-08 NOTE — CONSULTS
CONSULTATION    REASON FOR CONSULT:  Chest pain    REQUESTING PROVIDER:  See chart    CHIEF COMPLAINT:    Chief Complaint   Patient presents with    Chest Pain         HISTORY OF PRESENT ILLNESS:  Kyle Elias is a 54y.o. year-old male with past medical history significant for congestive heart failure with reduced ejection fraction ( EF 10-15%), coronary artery disease, hypertension, polycystic kidney disease, hypertension, hyperlipidemia, and pre-diabetes who was admitted to the hospital for further evaluation of chest pain. Patient was seen and examined in the emergency department. He is awake and alert, does not appear to be in any acute distress. Patient states that he developed chest pain that woke him at approximately 3:00 AM. Patient self administered 3- 81 mg asa and 2 sublingual nitroglycerin with some relief. Started on nitroglycerin drip at 10 mcgs. Patient states that he has noticed some lower extremity swelling and abdominal fullness, however he is able to lie relatively flat. He offers no other complaints. Family member at the bedside. Significant labs include: HS troponin 110 > 118> 171, pro-BNP 4538, chest -xray: showed vascular congestion  EKG: sinus rhythm; heart rate 81; left atria enlargement; nonspecific intraventricular conduction delay, non-specific T wave abnormalities in lateral leads. Records from hospital admission course thus far reviewed.         PAST MEDICAL HISTORY:    Past Medical History:   Diagnosis Date    Abdominal hernia 1/29/2020    Benign prostatic hyperplasia 3/16/2021    Chronic kidney disease     congenital polycytic kidneys    Congenital polycystic kidney 3/16/2021    Congestive heart failure ([address] 3/25/2020    Coronary arteriosclerosis 3/25/2020    Heart attack ([facility] 3/18/2020 and 06/30/2020    History of COVID-19 3/28/2021    Hypertension     Ill-defined condition     BPH, prostatitis    Ill-defined condition     hernia    Ill-defined condition ischemic cardiomyopathy    Ill-defined condition     (r) knee pain    Inguinal hernia     Morbid obesity ([address]     Prediabetes 3/28/2021    Spinal stenosis        PAST SURGICAL HISTORY:   Past Surgical History:   Procedure Laterality Date    HX HERNIA REPAIR  1564    umbiblical hernia    [place] CARDIAC SURG PROCEDURE UNLIST  06/30/2020    Stent placement       ALLERGIES:    Allergies   Allergen Reactions    Adhesive Tape-Silicones Rash     Paper tape causes  large blisters       FAMILY HISTORY:    Family History   Problem Relation Age of Onset    Hypertension Mother     Diabetes Mother     Kidney Disease Mother     Heart Disease Father     Hypertension Sister     Diabetes Brother     Kidney Disease Brother     No Known Problems Sister        SOCIAL HISTORY:    Tobacco: Never smoker  Drugs: Not documented  ETOH: Not documented    HOME MEDICATIONS:    Prior to Admission Medications   Prescriptions Last Dose Informant Patient Reported? Taking? HYDROcodone-acetaminophen (NORCO) 5-325 mg per tablet   Yes No   Sig: Dr Oswaldo Orozco U-100 Insulin 100 unit/mL (3 mL) inpn   Yes No   Sig: ADMINISTER 30 UNITS UNDER THE SKIN DAILY   aspirin delayed-release 81 mg tablet   Yes No   Sig: TAKE 1 TABLET BY MOUTH DAILY   atorvastatin (LIPITOR) 20 mg tablet   Yes No   Sig: TAKE 1 TABLET BY MOUTH DAILY   carvediloL (COREG) 12.5 mg tablet   Yes No   Sig: Take 3 Tablets by mouth two (2) times daily (with meals). ezetimibe (ZETIA) 10 mg tablet   Yes No   Sig: TAKE 1 TABLET BY MOUTH DAILY   furosemide (LASIX) 20 mg tablet   Yes No   Sig: Take 20 mg by mouth daily. 1200   furosemide (LASIX) 40 mg tablet   Yes No   Sig: Take 40 mg by mouth daily. hydrALAZINE (APRESOLINE) 10 mg tablet   Yes No   Sig: Take 20 mg by mouth three (3) times daily. insulin aspart U-100 (NOVOLOG) 100 unit/mL (3 mL) inpn   No No   Sig: Inject insulin subcutaneously three times daily before meals per sliding scale.  If blood sugar < 150- 0 units, if 150-199- 3 units, if 200-249-6 units, 250-299-9 units, 300-349-12 units, > 350 15 units and contact provider. Total daily units is 45. insulin glargine (LANTUS,BASAGLAR) 100 unit/mL (3 mL) inpn   Yes No   Si Units by SubCUTAneous route daily. isosorbide mononitrate ER (IMDUR) 30 mg tablet   Yes No   Sig: TAKE 1 TABLET BY MOUTH DAILY   nitroglycerin (NITROSTAT) 0.4 mg SL tablet   Yes No   Sig: ONE TABLET UNDER TONGUE AS NEEDED FOR CHEST PAIN   potassium chloride SR (KLOR-CON 10) 10 mEq tablet   Yes No   Si mEq.   prasugreL (EFFIENT) 10 mg tablet   Yes No   Sig: TAKE 1 TABLET BY MOUTH DAILY   tamsulosin (FLOMAX) 0.4 mg capsule   Yes No   Sig: TAKE 1 CAPSULE BY MOUTH DAILY      Facility-Administered Medications: None       REVIEW OF SYSTEMS:  Complete review of systems performed, pertinents noted above, all other systems are negative. Patient Vitals for the past 24 hrs:   Temp Pulse Resp BP SpO2   22 1129 -- 72 18 (!) 123/93 94 %   22 0916 97.8 °F (36.6 °C) 73 18 (!) 133/106 95 %       PHYSICAL EXAMINATION:    General: NAD, A&O  HEENT: Normocephalic, PERRL, no drainage  Neck: Supple, Trachea midline, No JVD  RESP: CTA bilaterally. + Symmetrical chest movement. No SOB or distress. On O2 per protocol  Cardiovascular: RRR no MRG  PVS: No rubor, cyanosis, mild LE edema  ABD: protuberant, soft, NT, Normoactive BS  Derm: Warm/Dry/Intact with no lesions  Neuro: A&O PPTS, No focal deficits  PSYCH: No anxiety or agitation    Recent labs results and imaging reviewed.       Recent Results (from the past 24 hour(s))   EKG, 12 LEAD, INITIAL    Collection Time: 22  4:23 AM   Result Value Ref Range    Ventricular Rate 81 BPM    Atrial Rate 80 BPM    P-R Interval 197 ms    QRS Duration 126 ms    Q-T Interval 387 ms    QTC Calculation (Bezet) 450 ms    Calculated P Axis 36 degrees    Calculated R Axis 34 degrees    Calculated T Axis -140 degrees    Diagnosis       Sinus rhythm  Probable left atrial enlargement  Nonspecific intraventricular conduction delay  Nonspecific T abnormalities, lateral leads  Baseline wander in lead(s) V2    Confirmed by Lyle Bauer M.D., Sherry Magaña ([id number]) on 4/8/2022 8:32:49 AM     CBC WITH AUTOMATED DIFF    Collection Time: 04/08/22  4:45 AM   Result Value Ref Range    WBC 8.7 4.4 - 11.3 K/uL    RBC 4.61 4.50 - 5.90 M/uL    HGB 10.9 (L) 13.5 - 17.5 g/dL    HCT 35.1 (L) 41 - 53 %    MCV 76.1 (L) 80 - 100 FL    MCH 23.5 (L) 31 - 34 PG    MCHC 30.9 (L) 31.0 - 36.0 g/dL    RDW 14.3 11.5 - 14.5 %    PLATELET 242 993 - 159 K/uL    MPV 9.3 6.5 - 11.5 FL    NRBC 0.0  WBC    ABSOLUTE NRBC 0.00 K/uL    NEUTROPHILS 82 (H) 42 - 75 %    LYMPHOCYTES 8 (L) 20.5 - 51.1 %    MONOCYTES 8 1.7 - 9.3 %    EOSINOPHILS 1 0.9 - 2.9 %    BASOPHILS 1 0.0 - 2.5 %    ABS. NEUTROPHILS 7.2 1.8 - 7.7 K/UL    ABS. LYMPHOCYTES 0.7 (L) 1.0 - 4.8 K/UL    ABS. MONOCYTES 0.7 0.2 - 2.4 K/UL    ABS. EOSINOPHILS 0.1 0.0 - 0.7 K/UL    ABS. BASOPHILS 0.1 0.0 - 0.2 K/UL   METABOLIC PANEL, COMPREHENSIVE    Collection Time: 04/08/22  4:45 AM   Result Value Ref Range    Sodium 137 136 - 145 mmol/L    Potassium 4.0 3.5 - 5.1 mmol/L    Chloride 104 97 - 108 mmol/L    CO2 25 21 - 32 mmol/L    Anion gap 8 5 - 15 mmol/L    Glucose 294 (H) 65 - 100 mg/dL    BUN 53 (H) 6 - 20 mg/dL    Creatinine 3.71 (H) 0.70 - 1.30 mg/dL    BUN/Creatinine ratio 14 12 - 20      GFR est AA 21 (L) >60 ml/min/1.73m2    GFR est non-AA 17 (L) >60 ml/min/1.73m2    Calcium 7.9 (L) 8.5 - 10.1 mg/dL    Bilirubin, total 0.7 0.2 - 1.0 mg/dL    AST (SGOT) 18 15 - 37 U/L    ALT (SGPT) 27 12 - 78 U/L    Alk.  phosphatase 65 45 - 117 U/L    Protein, total 6.3 (L) 6.4 - 8.2 g/dL    Albumin 2.6 (L) 3.5 - 5.0 g/dL    Globulin 3.7 2.0 - 4.0 g/dL    A-G Ratio 0.7 (L) 1.1 - 2.2     TROPONIN-HIGH SENSITIVITY    Collection Time: 04/08/22  4:45 AM   Result Value Ref Range    Troponin-High Sensitivity 110 (HH) 0 - 76 ng/L   PTT    Collection Time: 04/08/22 4:45 AM   Result Value Ref Range    aPTT 29.4 21.2 - 34.1 sec    aPTT, therapeutic range   82 - 109 sec   TROPONIN-HIGH SENSITIVITY    Collection Time: 04/08/22  6:00 AM   Result Value Ref Range    Troponin-High Sensitivity 118 (HH) 0 - 76 ng/L   TROPONIN-HIGH SENSITIVITY    Collection Time: 04/08/22  8:52 AM   Result Value Ref Range    Troponin-High Sensitivity 171 (HH) 0 - 76 ng/L   PTT    Collection Time: 04/08/22  8:52 AM   Result Value Ref Range    aPTT 58.4 (H) 21.2 - 34.1 sec    aPTT, therapeutic range   82 - 109 sec   CBC WITH AUTOMATED DIFF    Collection Time: 04/08/22  8:52 AM   Result Value Ref Range    WBC 8.6 4.1 - 11.1 K/uL    RBC 4.91 4.10 - 5.70 M/uL    HGB 12.0 (L) 12.1 - 17.0 g/dL    HCT 38.8 36.6 - 50.3 %    MCV 79.0 (L) 80.0 - 99.0 FL    MCH 24.4 (L) 26.0 - 34.0 PG    MCHC 30.9 30.0 - 36.5 g/dL    RDW 13.7 11.5 - 14.5 %    PLATELET 868 387 - 837 K/uL    MPV 12.5 8.9 - 12.9 FL    NRBC 0.0 0.0  WBC    ABSOLUTE NRBC 0.00 0.00 - 0.01 K/uL    NEUTROPHILS 80 (H) 32 - 75 %    LYMPHOCYTES 11 (L) 12 - 49 %    MONOCYTES 8 5 - 13 %    EOSINOPHILS 1 0 - 7 %    BASOPHILS 0 0 - 1 %    IMMATURE GRANULOCYTES 0 0 - 0.5 %    ABS. NEUTROPHILS 6.9 1.8 - 8.0 K/UL    ABS. LYMPHOCYTES 1.0 0.8 - 3.5 K/UL    ABS. MONOCYTES 0.7 0.0 - 1.0 K/UL    ABS. EOSINOPHILS 0.1 0.0 - 0.4 K/UL    ABS. BASOPHILS 0.0 0.0 - 0.1 K/UL    ABS. IMM. GRANS. 0.0 0.00 - 0.04 K/UL    DF AUTOMATED     GLUCOSE, POC    Collection Time: 04/08/22 11:37 AM   Result Value Ref Range    Glucose (POC) 194 (H) 65 - 117 mg/dL    Performed by EV MANJRAREZ        XR Results (maximum last 3): Results from Hospital Encounter encounter on 04/08/22    XR CHEST PORT    Impression  In the acute setting the findings are most suggestive of  CHF/pulmonary edema with axial interstitial and pulmonary parenchymal components  of edema. .      Results from [place] encounter on 11/07/21    XR CHEST PORT    Impression  No evidence of an acute cardiopulmonary process. Nuclear Medicine Results (maximum last 3):   Results from Abstract encounter on 02/18/22    NM MYOCARDIAL PERFUSION MULTIPLE            Current Facility-Administered Medications:     nitroglycerin (Tridil) 200 mcg/ml infusion, 0-200 mcg/min, IntraVENous, TITRATE, Audelia Angel MD, Last Rate: 1.5 mL/hr at 04/08/22 0939, 5 mcg/min at 04/08/22 0939    heparin 25,000 units in D5W 250 ml infusion, 8-25 Units/kg/hr, IntraVENous, TITRATEAudelia MD, Last Rate: 9.2 mL/hr at 04/08/22 0901, 8 Units/kg/hr at 04/08/22 0901    heparin (porcine) 1,000 unit/mL injection 4,000 Units, 4,000 Units, IntraVENous, PRN **OR** heparin (porcine) 1,000 unit/mL injection 2,000 Units, 2,000 Units, IntraVENous, PRN, Audelia Angel MD    sodium chloride (NS) flush 5-40 mL, 5-40 mL, IntraVENous, Q8H, Fernanda Vega MD, 10 mL at 04/08/22 1131    sodium chloride (NS) flush 5-40 mL, 5-40 mL, IntraVENous, PRN, Audelia Angel MD    acetaminophen (TYLENOL) tablet 650 mg, 650 mg, Oral, Q6H PRN **OR** acetaminophen (TYLENOL) suppository 650 mg, 650 mg, Rectal, Q6H PRN, Audelia Angel MD    polyethylene glycol (MIRALAX) packet 17 g, 17 g, Oral, DAILY PRN, Audelia Angel MD    ondansetron (ZOFRAN ODT) tablet 4 mg, 4 mg, Oral, Q8H PRN **OR** ondansetron (ZOFRAN) injection 4 mg, 4 mg, IntraVENous, Q6H PRN, Audelia Angel MD    aspirin delayed-release tablet 81 mg, 81 mg, Oral, DAILY, Fernanda Vega MD, 81 mg at 04/08/22 1013    carvediloL (COREG) tablet 37.5 mg, 37.5 mg, Oral, BID WITH MEALS, Fernanda Vega MD, 37.5 mg at 04/08/22 1013    ezetimibe (ZETIA) tablet 10 mg, 10 mg, Oral, DAILY, Audelia Angel MD    hydrALAZINE (APRESOLINE) tablet 20 mg, 20 mg, Oral, TID, Audelia Angel MD    isosorbide mononitrate ER (IMDUR) tablet 30 mg, 30 mg, Oral, DAILY, Fernanda Vega MD    nitroglycerin (NITROSTAT) tablet 0.4 mg, 0.4 mg, SubLINGual, PRN, MD Darion Jensen ON 4/9/2022] prasugreL (EFFIENT) tablet 10 mg, 10 mg, Oral, DAILY, Wendi Allen MD    [facility]) capsule 0.4 mg, 0.4 mg, Oral, DAILY, Fernanda Vega MD, 0.4 mg at 04/08/22 1014    furosemide (LASIX) tablet 80 mg, 80 mg, Oral, DAILY, Wendi Allen MD, 80 mg at 04/08/22 1013    glucose chewable tablet 16 g, 4 Tablet, Oral, PRN, Wendi Allen MD    dextrose 10% infusion 0-250 mL, 0-250 mL, IntraVENous, PRN, Wendi Allen MD    glucagon (GLUCAGEN) injection 1 mg, 1 mg, IntraMUSCular, PRN, Wendi Allen MD    insulin glargine (LANTUS) injection 30 Units, 30 Units, SubCUTAneous, DAILY, Fernanda Vega MD    insulin lispro (HUMALOG) injection, , SubCUTAneous, TIDAC, Wendi Allen MD    HYDROcodone-acetaminophen (NORCO) 5-325 mg per tablet 1 Tablet, 1 Tablet, Oral, BID PRN, Wendi Allen MD    furosemide (LASIX) tablet 20 mg, 20 mg, Oral, Q24H, Fernanda Vega MD    atorvastatin (LIPITOR) tablet 40 mg, 40 mg, Oral, QHS, Wendi Allen MD    Current Outpatient Medications:     insulin glargine (LANTUS,BASAGLAR) 100 unit/mL (3 mL) inpn, 40 Units by SubCUTAneous route daily. , Disp: , Rfl:     insulin aspart U-100 (NOVOLOG) 100 unit/mL (3 mL) inpn, Inject insulin subcutaneously three times daily before meals per sliding scale. If blood sugar < 150- 0 units, if 150-199- 3 units, if 200-249-6 units, 250-299-9 units, 300-349-12 units, > 350 15 units and contact provider. Total daily units is 45., Disp: 45 mL, Rfl: 1    carvediloL (COREG) 12.5 mg tablet, Take 3 Tablets by mouth two (2) times daily (with meals). , Disp: , Rfl:     furosemide (LASIX) 40 mg tablet, Take 40 mg by mouth daily. , Disp: , Rfl:     furosemide (LASIX) 20 mg tablet, Take 20 mg by mouth daily.  1200, Disp: , Rfl:     Lantus Solostar U-100 Insulin 100 unit/mL (3 mL) inpn, ADMINISTER 30 UNITS UNDER THE SKIN DAILY, Disp: , Rfl:     aspirin delayed-release 81 mg tablet, TAKE 1 TABLET BY MOUTH DAILY, Disp: , Rfl:    atorvastatin (LIPITOR) 20 mg tablet, TAKE 1 TABLET BY MOUTH DAILY, Disp: , Rfl:     ezetimibe (ZETIA) 10 mg tablet, TAKE 1 TABLET BY MOUTH DAILY, Disp: , Rfl:     hydrALAZINE (APRESOLINE) 10 mg tablet, Take 20 mg by mouth three (3) times daily. , Disp: , Rfl:     isosorbide mononitrate ER (IMDUR) 30 mg tablet, TAKE 1 TABLET BY MOUTH DAILY, Disp: , Rfl:     potassium chloride SR (KLOR-CON 10) 10 mEq tablet, 20 mEq., Disp: , Rfl:     prasugreL (EFFIENT) 10 mg tablet, TAKE 1 TABLET BY MOUTH DAILY, Disp: , Rfl:     tamsulosin (FLOMAX) 0.4 mg capsule, TAKE 1 CAPSULE BY MOUTH DAILY, Disp: , Rfl:     HYDROcodone-acetaminophen (NORCO) 5-325 mg per tablet, Dr Tamiko Avitia, Disp: , Rfl:     nitroglycerin (NITROSTAT) 0.4 mg SL tablet, ONE TABLET UNDER TONGUE AS NEEDED FOR CHEST PAIN, Disp: , Rfl:     Case discussed with collaborating physician Dr. Nate Romero and our impression and recommendations are as follows:     1. Elevated troponin:   - patient with active chest pain that started at 0300 this am, patient 9/10, self administered 324 asa and nitrol SL x 1 dose, pain improved to 6/10, started on nitro gtt 10 mcg/min and transferred to [facility] for higher level of care. - HS troponin currently 171, repeat pending. Troponin elevation in the setting of CKD r/t polycystic kidney disease and HFrEF with volume overload. - Patient is tentatively scheduled for a cardiac catheterization today pending, repeat troponin and nephrology consult, Cr 3.71.   - Written consent was obtained and patient remains NPO.     2. NSTEMI with hx of ICM:   - 11/8/21: [facility] with in stent thrombosis to the LAD, lesion to the RPLB and  to the PDA  - 6/20 s/p PAVEL to mid/distal LCx   - 3/20 PCI to LAD  - currently on asa and prasugrel 10 mg daily    3. Congestive heart failure with reduced ejection fraction (HFrEF):   - 01/22 echo: EF 10-15%, moderately dilated LV,  grade III diastolic dysfunction, sever global hypokinesis, Moderate MR.    - will repeat echocardiogram  - appreciate nephrology recommendations for diuresis, currently on lasix lasix 40 mg in the am and lunchtime, an additional dose of 20 mg at bedtime.   - hold ACE/ARB/ARNi history of polycystic kidney disease, Cr 3.71       4. Coronary artery disease:   - continue asa 81 mg daily, atorvastatin 40 mg daily, coreg 37.5 mg BID    5. Uncontrolled hypertension:   - blood pressure at goal with nitro gtt. Will discontinue. - hydralazine 10 mg TID, Coreg 37.5 mg BID, lasix 100 mg daily    6. Polycystic kidney disease:   - nephrology consult pending, appreciate recommendations, possible cardiac cath pending.   - total of 100 mg lasix daily, Cr 3.71, according to patient this is his baseline Cr. Thank you for involving us in the care of this patient. Please do not hesitate to call if additional questions arise. If after hours please call [phone]. Dr. Elio [facility]  [address] [place], [address] [facility] [place]  [phone]

## 2022-04-08 NOTE — ED PROVIDER NOTES
EMERGENCY DEPARTMENT HISTORY AND PHYSICAL EXAM  ?    Date: 4/8/2022  Patient Name: Rbuen Garcia    History of Presenting Illness    Patient presents with:  Chest Pain      History Provided By: Patient    HPI: Ruben Garcia, 54 y.o. male with a past medical history significant for diabetes, CKD, CAD, coronary stents, and myocardial infarction presents to the ED with cc of waking up with chest pain at 3 AM.  Pain was 10 out of 10 he took 3 baby nitroglycerin. Pain with at that time is rated 6 out of 10. He took 1 nitroglycerin in route. Pain when he took the nitroglycerin was 9 and came down to 6. He denies diaphoresis, shortness of breath or nausea. There are no other complaints, changes, or physical findings at this time. PCP: Marco Estrada NP    Current Facility-Administered Medications:  nitroglycerin (NITROSTAT) tablet 0.4 mg, 0.4 mg, SubLINGual, NOW, Lucian Dewey MD  morphine injection 4 mg, 4 mg, IntraVENous, ONCE, Lucian Dewey MD  ondansetron [facility]) injection 4 mg, 4 mg, IntraVENous, ONCE, Lucian eDwey MD  0.9% sodium chloride infusion, 75 mL/hr, IntraVENous, ONCE, Lucian Dewey MD    Current Outpatient Medications:  insulin glargine (LANTUS,BASAGLAR) 100 unit/mL (3 mL) inpn, 40 Units by SubCUTAneous route daily. , Disp: , Rfl:   insulin aspart U-100 (NOVOLOG) 100 unit/mL (3 mL) inpn, Inject insulin subcutaneously three times daily before meals per sliding scale. If blood sugar < 150- 0 units, if 150-199- 3 units, if 200-249-6 units, 250-299-9 units, 300-349-12 units, > 350 15 units and contact provider. Total daily units is 45., Disp: 45 mL, Rfl: 1  carvediloL (COREG) 12.5 mg tablet, Take 3 Tablets by mouth two (2) times daily (with meals). , Disp: , Rfl:   furosemide (LASIX) 40 mg tablet, Take 40 mg by mouth daily. , Disp: , Rfl:   furosemide (LASIX) 20 mg tablet, Take 20 mg by mouth daily.  1200, Disp: , Rfl:   Lantus Solostar U-100 Insulin 100 unit/mL (3 mL) inpn, ADMINISTER 30 UNITS UNDER THE SKIN DAILY, Disp: , Rfl:   aspirin delayed-release 81 mg tablet, TAKE 1 TABLET BY MOUTH DAILY, Disp: , Rfl:   atorvastatin (LIPITOR) 20 mg tablet, TAKE 1 TABLET BY MOUTH DAILY, Disp: , Rfl:   ezetimibe (ZETIA) 10 mg tablet, TAKE 1 TABLET BY MOUTH DAILY, Disp: , Rfl:   hydrALAZINE (APRESOLINE) 10 mg tablet, Take 20 mg by mouth three (3) times daily. , Disp: , Rfl:   isosorbide mononitrate ER (IMDUR) 30 mg tablet, TAKE 1 TABLET BY MOUTH DAILY, Disp: , Rfl:   potassium chloride SR (KLOR-CON 10) 10 mEq tablet, 20 mEq., Disp: , Rfl:   prasugreL (EFFIENT) 10 mg tablet, TAKE 1 TABLET BY MOUTH DAILY, Disp: , Rfl:   tamsulosin (FLOMAX) 0.4 mg capsule, TAKE 1 CAPSULE BY MOUTH DAILY, Disp: , Rfl:   HYDROcodone-acetaminophen (NORCO) 5-325 mg per tablet, Dr Rosemary Yi, Disp: , Rfl:   nitroglycerin (NITROSTAT) 0.4 mg SL tablet, ONE TABLET UNDER TONGUE AS NEEDED FOR CHEST PAIN, Disp: , Rfl:         Past History    Past Medical History:  Past Medical History:  1/29/2020: Abdominal hernia  3/16/2021: Benign prostatic hyperplasia  No date: Chronic kidney disease     Comment:  congenital polycytic kidneys  3/16/2021: Congenital polycystic kidney  3/25/2020: Congestive heart failure ([facility] [address]  3/25/2020: Coronary arteriosclerosis  3/18/2020 and 06/30/2020: Heart attack ([facility] [address]  3/28/2021: History of COVID-19  No date: Hypertension  No date: Ill-defined condition     Comment:  BPH, prostatitis  No date: Ill-defined condition     Comment:  hernia  No date: Ill-defined condition     Comment:  ischemic cardiomyopathy  No date: Ill-defined condition     Comment:  (r) knee pain  No date: Inguinal hernia  No date:  Morbid obesity ([facility] [address]  3/28/2021: Prediabetes  No date: Spinal stenosis    Past Surgical History:  Past Surgical History:  1999: HX HERNIA REPAIR     Comment:  umbiblical hernia  31/39/6484: [place] CARDIAC SURG PROCEDURE UNLIST     Comment:  Stent placement    Family History:  Review of patient's family history indicates:  Problem: Hypertension Relation: Mother         Age of Onset: (Not Specified)  Problem: Diabetes     Relation: Mother         Age of Onset: (Not Specified)  Problem: Kidney Disease     Relation: Mother         Age of Onset: (Not Specified)  Problem: Heart Disease     Relation: Father         Age of Onset: (Not Specified)  Problem: Hypertension     Relation: Sister         Age of Onset: (Not Specified)  Problem: Diabetes     Relation: Brother         Age of Onset: (Not Specified)  Problem: Kidney Disease     Relation: Brother         Age of Onset: (Not Specified)  Problem: No Known Problems     Relation: Sister         Age of Onset: (Not Specified)      Social History:  Social History   Tobacco Use     Smoking status: Never Smoker     Smokeless tobacco: Never Used   Vaping Use     Vaping Use: Never used   Alcohol use: Not Currently   Drug use: Never      Allergies:  -- Adhesive Tape-Silicones -- Rash   --  Paper tape causes  large blisters      Review of Systems  [unfilled]    Physical Exam  [unfilled]    Diagnostic Study Results    Labs -  Recent Results (from the past 12 hour(s))  -EKG, 12 LEAD, INITIAL:   Collection Time: 04/08/22  4:23 AM      Result                      Value             Ref Range          Ventricular Rate            81                BPM                Atrial Rate                 80                BPM                P-R Interval                197               ms                 QRS Duration                126               ms                 Q-T Interval                387               ms                 QTC Calculation (Bezet)     450               ms                 Calculated P Axis           36                degrees            Calculated R Axis           34                degrees            Calculated T Axis           -140              degrees            Diagnosis                                                    Sinus rhythm Probable left atrial enlargement Nonspecific intraventricular conduction delay Nonspecific T abnormalities, lateral leads Baseline wander in lead(s) V2     Radiologic Studies -   XR CHEST PORT    (Results Pending)  CT Results  (Last 48 hours)   None     CXR Results  (Last 48 hours)   None       Medical Decision Making and ED Course  I am the first provider for this patient. I reviewed the vital signs, available nursing notes, past medical history, past surgical history, family history and social history. Vital Signs-Reviewed the patient's vital signs. Empty flowsheet group. EKG interpretation: (Preliminary)  Rhythm: normal sinus rhythm; and regular . Rate (approx.): 81; Axis: normal; NV interval: normal; QRS interval: prolonged; ST/T wave: T wave inverted; Other findings: abnormal ekg. Records Reviewed: Nursing Notes and Old Medical Records    Provider Notes (Medical Decision Making):   Rule out ACS. The patient presents with differential diagnosis of ACS, aortic dissection. ED Course:   7:30 recheck -chest pain is rated 3 out of 10. Nitroglycerin is at 5 mcgs and will increase to 10 mcgs. We will transfer patient to [facility]. Initial assessment performed. The patients presenting problems have been discussed, and they are in agreement with the care plan formulated and outlined with them. I have encouraged them to ask questions as they arise throughout their visit. CRITICAL CARE NOTE :  4:38 AM  Amount of Critical Care Time: ___45_(minutes)__    IMPENDING DETERIORATION -Cardiovascular  ASSOCIATED RISK FACTORS - Dysrhythmia and Vascular Compromise  MANAGEMENT- Bedside Assessment and Supervision of Care  INTERPRETATION -  Blood Pressure and Cardiac Output Measures   INTERVENTIONS - hemodynamic mngmt  CASE REVIEW - Medical Sub-Specialist  TREATMENT RESPONSE -Stable  PERFORMED BY - Self    NOTES   :  I have spent critical care time involved in lab review, consultations with specialist, family decision- making, bedside attention and documentation.  This time excludes time spent in any separate billed procedures. During this entire length of time I was immediately available to the patient . Char Franz MD        Disposition            DISCHARGE PLAN:  1. Current Discharge Medication List    CONTINUE these medications which have NOT CHANGED    !! insulin glargine (LANTUS,BASAGLAR) 100 unit/mL (3 mL) inpn  40 Units by SubCUTAneous route daily. insulin aspart U-100 (NOVOLOG) 100 unit/mL (3 mL) inpn  Inject insulin subcutaneously three times daily before meals per sliding scale. If blood sugar < 150- 0 units, if 150-199- 3 units, if 200-249-6 units, 250-299-9 units, 300-349-12 units, > 350 15 units and contact provider. Total daily units is 45. Qty: 45 mL Refills: 1  Associated Diagnoses:Type 2 diabetes mellitus with hyperglycemia, without long-term current use of insulin (HCC)    carvediloL (COREG) 12.5 mg tablet  Take 3 Tablets by mouth two (2) times daily (with meals). !! furosemide (LASIX) 40 mg tablet  Take 40 mg by mouth daily. !! furosemide (LASIX) 20 mg tablet  Take 20 mg by mouth daily. 1200    !! Lantus Solostar U-100 Insulin 100 unit/mL (3 mL) inpn  ADMINISTER 30 UNITS UNDER THE SKIN DAILY    aspirin delayed-release 81 mg tablet  TAKE 1 TABLET BY MOUTH DAILY    atorvastatin (LIPITOR) 20 mg tablet  TAKE 1 TABLET BY MOUTH DAILY    ezetimibe (ZETIA) 10 mg tablet  TAKE 1 TABLET BY MOUTH DAILY    hydrALAZINE (APRESOLINE) 10 mg tablet  Take 20 mg by mouth three (3) times daily.     isosorbide mononitrate ER (IMDUR) 30 mg tablet  TAKE 1 TABLET BY MOUTH DAILY    potassium chloride SR (KLOR-CON 10) 10 mEq tablet  20 mEq.    prasugreL (EFFIENT) 10 mg tablet  TAKE 1 TABLET BY MOUTH DAILY    tamsulosin (FLOMAX) 0.4 mg capsule  TAKE 1 CAPSULE BY MOUTH DAILY    HYDROcodone-acetaminophen (NORCO) 5-325 mg per tablet  Dr Ca Miller    nitroglycerin (NITROSTAT) 0.4 mg SL tablet  ONE TABLET UNDER TONGUE AS NEEDED FOR CHEST PAIN    !! - Potential duplicate medications found. Please discuss with provider. 2. Follow-up Information    None    3. Return to ED if worse     Diagnosis    Clinical Impression: Acute coronary syndrome  Attestations:    Ankita Hurd MD    Please note that this dictation was completed with [employer], the computer voice recognition software. Quite often unanticipated grammatical, syntax, homophones, and other interpretive errors are inadvertently transcribed by the computer software. Please disregard these errors. Please excuse any errors that have escaped final proofreading. Thank you.    ?            Past Medical History:   Diagnosis Date    Abdominal hernia 1/29/2020    Benign prostatic hyperplasia 3/16/2021    Chronic kidney disease     congenital polycytic kidneys    Congenital polycystic kidney 3/16/2021    Congestive heart failure ([facility] [address] 3/25/2020    Coronary arteriosclerosis 3/25/2020    Heart attack ([address] 3/18/2020 and 06/30/2020    History of COVID-19 3/28/2021    Hypertension     Ill-defined condition     BPH, prostatitis    Ill-defined condition     hernia    Ill-defined condition     ischemic cardiomyopathy    Ill-defined condition     (r) knee pain    Inguinal hernia     Morbid obesity ([address]     Prediabetes 3/28/2021    Spinal stenosis        Past Surgical History:   Procedure Laterality Date    HX HERNIA REPAIR  3915    umbiblical hernia    [place] CARDIAC SURG PROCEDURE UNLIST  06/30/2020    Stent placement         Family History:   Problem Relation Age of Onset    Hypertension Mother     Diabetes Mother     Kidney Disease Mother     Heart Disease Father     Hypertension Sister     Diabetes Brother     Kidney Disease Brother     No Known Problems Sister        Social History     Socioeconomic History    Marital status:      Spouse name: Ravin Mata Number of children: 6    Years of education: Not on file    Highest education level: Some college, no degree   Occupational History     Comment: [employer]   Tobacco Use    Smoking status: Never Smoker    Smokeless tobacco: Never Used   Vaping Use    Vaping Use: Never used   Substance and Sexual Activity    Alcohol use: Not Currently    Drug use: Never    Sexual activity: Yes     Partners: Female   Other Topics Concern     Service No    Blood Transfusions No    Caffeine Concern No    Occupational Exposure No    Hobby Hazards No    Sleep Concern No    Stress Concern No    Weight Concern No    Special Diet Yes     Comment: low sugar, heart healthy    Back Care Yes     Comment: spinal stenosis    Exercise Yes     Comment: walking    Bike Helmet No    Seat Belt Yes    Self-Exams Yes   Social History Narrative    Not on file     Social Determinants of Health     Financial Resource Strain:     Difficulty of Paying Living Expenses: Not on file   Food Insecurity:     Worried About Running Out of Food in the Last Year: Not on file    Shira of Food in the Last Year: Not on file   Transportation Needs:     Lack of Transportation (Medical): Not on file    Lack of Transportation (Non-Medical):  Not on file   Physical Activity:     Days of Exercise per Week: Not on file    Minutes of Exercise per Session: Not on file   Stress:     Feeling of Stress : Not on file   Social Connections:     Frequency of Communication with Friends and Family: Not on file    Frequency of Social Gatherings with Friends and Family: Not on file    Attends [employer] Services: Not on file    Active Member of Clubs or Organizations: Not on file    Attends Club or Organization Meetings: Not on file    Marital Status: Not on file   Intimate Partner Violence:     Fear of Current or Ex-Partner: Not on file    Emotionally Abused: Not on file    Physically Abused: Not on file    Sexually Abused: Not on file   Housing Stability:     Unable to Pay for Housing in the Last Year: Not on file    Number of Places Lived in the Last Year: Not on file    Unstable Housing in the Last Year: Not on file         ALLERGIES: Adhesive tape-silicones    Review of Systems   Constitutional: Negative. HENT: Negative. Eyes: Negative. Respiratory: Negative. Cardiovascular: Positive for chest pain. Gastrointestinal: Negative. Endocrine: Negative. Genitourinary: Negative. Musculoskeletal: Negative. Allergic/Immunologic: Negative. Neurological: Negative. Hematological: Negative. Psychiatric/Behavioral: Negative. Vitals:    04/08/22 0428   BP: (!) 138/98   Pulse: 82   Resp: 19   Temp: 97.8 °F (36.6 °C)   SpO2: 99%            Physical Exam  Vitals and nursing note reviewed. Constitutional:       Appearance: Normal appearance. He is normal weight. HENT:      Head: Normocephalic and atraumatic. Right Ear: Tympanic membrane, ear canal and external ear normal.      Left Ear: Tympanic membrane, ear canal and external ear normal.      Nose: Nose normal.      Mouth/Throat:      Mouth: Mucous membranes are moist.      Pharynx: Oropharynx is clear. Eyes:      Extraocular Movements: Extraocular movements intact. Conjunctiva/sclera: Conjunctivae normal.      Pupils: Pupils are equal, round, and reactive to light. Cardiovascular:      Rate and Rhythm: Normal rate and regular rhythm. Pulses: Normal pulses. Heart sounds: Normal heart sounds. Pulmonary:      Effort: Pulmonary effort is normal.      Breath sounds: Normal breath sounds. Abdominal:      General: Abdomen is flat. Bowel sounds are normal.      Palpations: Abdomen is soft. Musculoskeletal:         General: Normal range of motion. Cervical back: Normal range of motion and neck supple. Skin:     General: Skin is warm and dry. Neurological:      General: No focal deficit present. Mental Status: He is alert and oriented to person, place, and time.    Psychiatric:         Mood and Affect: Mood normal.         Behavior: Behavior normal.          MDM Procedures

## 2022-04-08 NOTE — Clinical Note
TRANSFER - OUT REPORT:     Verbal report given to: mahesh (at bedside). Report consisted of patient's Situation, Background, Assessment and   Recommendations(SBAR). Opportunity for questions and clarification was provided. Patient transported with a Registered Nurse.

## 2022-04-08 NOTE — H&P
Hospitalist Admission Note    NAME: Natanael Montano   :  1970   MRN:  [id number]     Date/Time:  2022 10:42 AM    Patient PCP: Karyn Alcantara NP  ______________________________________________________________________  Given the patient's current clinical presentation, I have a high level of concern for decompensation if discharged from the emergency department. Complex decision making was performed, which includes reviewing the patient's available past medical records, laboratory results, and x-ray films. Subjective:   CHIEF COMPLAINT: Chest pain    HISTORY OF PRESENT ILLNESS:     Natanael Montano is a 54 y.o. With hx of type II diabetes, CKD, CAD s/p cardiac stentX3, CHFrEF(EF 15% on 22) presented to ED with c/o chest pain. Patient reports that chest pain started around 3 am this morning, chest pain was 10/10 in intensity, sub sternal, non radiating, no aggravating factor, relieved by nitroglycerin. Not associated with shortness of breath, diaphoresis, nausea, vomiting. Patient has been trying to get nephrology appointment, doesn't have one currently. We were asked to admit for work up and evaluation of the above problems.      Past Medical History:   Diagnosis Date    Abdominal hernia 2020    Benign prostatic hyperplasia 3/16/2021    Chronic kidney disease     congenital polycytic kidneys    Congenital polycystic kidney 3/16/2021    Congestive heart failure ([address] 3/25/2020    Coronary arteriosclerosis 3/25/2020    Heart attack ([address] 3/18/2020 and 2020    History of COVID-19 3/28/2021    Hypertension     Ill-defined condition     BPH, prostatitis    Ill-defined condition     hernia    Ill-defined condition     ischemic cardiomyopathy    Ill-defined condition     (r) knee pain    Inguinal hernia     Morbid obesity ([address]     Prediabetes 3/28/2021    Spinal stenosis         Past Surgical History:   Procedure Laterality Date    [address] hernia    [place] CARDIAC SURG PROCEDURE UNLIST  06/30/2020    Stent placement       Social History     Tobacco Use    Smoking status: Never Smoker    Smokeless tobacco: Never Used   Substance Use Topics    Alcohol use: Not Currently        Family History   Problem Relation Age of Onset    Hypertension Mother     Diabetes Mother     Kidney Disease Mother     Heart Disease Father     Hypertension Sister     Diabetes Brother     Kidney Disease Brother     No Known Problems Sister    Family history reviewed and not pertinent to patient's presentation. Allergies   Allergen Reactions    Adhesive Tape-Silicones Rash     Paper tape causes  large blisters        Prior to Admission medications    Medication Sig Start Date End Date Taking? Authorizing Provider   insulin glargine (LANTUS,BASAGLAR) 100 unit/mL (3 mL) inpn 40 Units by SubCUTAneous route daily. Provider, Historical   insulin aspart U-100 (NOVOLOG) 100 unit/mL (3 mL) inpn Inject insulin subcutaneously three times daily before meals per sliding scale. If blood sugar < 150- 0 units, if 150-199- 3 units, if 200-249-6 units, 250-299-9 units, 300-349-12 units, > 350 15 units and contact provider. Total daily units is 45. 3/24/22   Jem English NP   carvediloL (COREG) 12.5 mg tablet Take 3 Tablets by mouth two (2) times daily (with meals). Provider, Historical   furosemide (LASIX) 40 mg tablet Take 40 mg by mouth daily. Provider, Historical   furosemide (LASIX) 20 mg tablet Take 20 mg by mouth daily.  1200    Provider, Historical   Lantus Solostar U-100 Insulin 100 unit/mL (3 mL) inpn ADMINISTER 30 UNITS UNDER THE SKIN DAILY 11/10/21   Provider, Historical   aspirin delayed-release 81 mg tablet TAKE 1 TABLET BY MOUTH DAILY 2/12/21   Provider, Historical   atorvastatin (LIPITOR) 20 mg tablet TAKE 1 TABLET BY MOUTH DAILY 2/12/21   Provider, Historical   ezetimibe (ZETIA) 10 mg tablet TAKE 1 TABLET BY MOUTH DAILY 1/20/21   Provider, Historical hydrALAZINE (APRESOLINE) 10 mg tablet Take 20 mg by mouth three (3) times daily. 1/19/21   Provider, Historical   isosorbide mononitrate ER (IMDUR) 30 mg tablet TAKE 1 TABLET BY MOUTH DAILY 1/19/21   Provider, Historical   potassium chloride SR (KLOR-CON 10) 10 mEq tablet 20 mEq. 2/13/21   Provider, Historical   prasugreL (EFFIENT) 10 mg tablet TAKE 1 TABLET BY MOUTH DAILY 1/16/21   Provider, Historical   tamsulosin (FLOMAX) 0.4 mg capsule TAKE 1 CAPSULE BY MOUTH DAILY 2/28/21   Provider, Historical   HYDROcodone-acetaminophen (NORCO) 5-325 mg per tablet Dr Annette Neely 2/19/21   Provider, Historical   nitroglycerin (NITROSTAT) 0.4 mg SL tablet ONE TABLET UNDER TONGUE AS NEEDED FOR CHEST PAIN 1/10/21   Provider, Historical       REVIEW OF SYSTEMS:     I am not able to complete the review of systems because:    The patient is intubated and sedated    The patient has altered mental status due to his acute medical problems    The patient has baseline aphasia from prior stroke(s)    The patient has baseline dementia and is not reliable historian    The patient is in acute medical distress and unable to provide information           Total of 12 systems reviewed as follows:       POSITIVE= underlined text  Negative = text not underlined  General:  fever, chills, sweats, generalized weakness, weight loss/gain,      loss of appetite   Eyes:    blurred vision, eye pain, loss of vision, double vision  ENT:    rhinorrhea, pharyngitis   Respiratory:   cough, sputum production, SOB, PEREZ, wheezing, pleuritic pain   Cardiology:   chest pain, palpitations, orthopnea, PND, edema, syncope   Gastrointestinal:  abdominal pain , N/V, diarrhea, dysphagia, constipation, bleeding   Genitourinary:  frequency, urgency, dysuria, hematuria, incontinence   Muskuloskeletal :  arthralgia, myalgia, back pain  Hematology:  easy bruising, nose or gum bleeding, lymphadenopathy   Dermatological: rash, ulceration, pruritis, color change / jaundice  Endocrine: hot flashes or polydipsia   Neurological:  headache, dizziness, confusion, focal weakness, paresthesia,     Speech difficulties, memory loss, gait difficulty  Psychological: Feelings of anxiety, depression, agitation    Objective:   VITALS:    Visit Vitals  BP (!) 133/106 (BP 1 Location: Right arm, BP Patient Position: At rest)   Pulse 73   Temp 97.8 °F (36.6 °C)   Resp 18   Ht 6' 3\" (1.905 m)   Wt 115.2 kg (254 lb)   SpO2 95%   BMI 31.75 kg/m²       PHYSICAL EXAM:    General:    Alert, cooperative, no distress, appears stated age. HEENT: Atraumatic, anicteric sclerae, pink conjunctivae     No oral ulcers, mucosa moist, throat clear, dentition fair  Neck:  Supple, symmetrical,  thyroid: non tender  Lungs:   Clear to auscultation bilaterally. No Wheezing or Rhonchi. No rales. Chest wall:  No tenderness  No Accessory muscle use. Heart:   Regular  rhythm,  No  murmur   No edema  Abdomen:   Soft, non-tender. Not distended. Bowel sounds normal  Extremities: No cyanosis. No clubbing,      Skin turgor normal, Capillary refill normal, Radial dial pulse 2+  Skin:     Not pale. Not Jaundiced  No rashes   Psych:  Good insight. Not depressed. Not anxious or agitated. Neurologic: EOMs intact. No facial asymmetry. No aphasia or slurred speech. Symmetrical strength, Sensation grossly intact. Alert and oriented X 4.     _______________________________________________________________________      My assessment of this patient's clinical condition and my plan of care is as follows. Assessment / Plan:  Acute chest pain:  VARUN score 5  Cardiology consulted- plan for cardiac cath today. Continue heparin drip  Continue nitroglycerin drip  Continue dual anti platelets. NSTEMI  As above    CKD:  Baseline Creatinine  Will consult nephrology as patient is getting cath today    CHFrEF:  EF 15% on 1/28/22  Continue lasix  Continue coreg    Hypertension:  Resume home medications.       Code Status: Full  DVT Prophylaxis: on heparin drip  GI Prophylaxis: not indicated        Care Plan discussed with:    Comments   Patient x    Family      RN     Care Manager                    Consultant:      _______________________________________________________________________  Expected  Disposition:   Home with Family x   HH/PT/OT/RN    SNF/LTC    EVAN    ________________________________________________________________________    I personally spent   35  minutes in patient's care. This is time spent at  patient's bedside actively involved in patient care as well as the coordination of care and discussions with the patient's family. This does not include any procedural time which has been billed separately. ________________________________________________________________________  Signed: Delano Schilder, MD    Procedures: see electronic medical records for all procedures/Xrays and details which were not copied into this note but were reviewed prior to creation of Plan.     LAB DATA REVIEWED:    Recent Results (from the past 24 hour(s))   EKG, 12 LEAD, INITIAL    Collection Time: 04/08/22  4:23 AM   Result Value Ref Range    Ventricular Rate 81 BPM    Atrial Rate 80 BPM    P-R Interval 197 ms    QRS Duration 126 ms    Q-T Interval 387 ms    QTC Calculation (Bezet) 450 ms    Calculated P Axis 36 degrees    Calculated R Axis 34 degrees    Calculated T Axis -140 degrees    Diagnosis       Sinus rhythm  Probable left atrial enlargement  Nonspecific intraventricular conduction delay  Nonspecific T abnormalities, lateral leads  Baseline wander in lead(s) V2    Confirmed by Brisa Muñoz M.D., Dilshad Barone ([id number]) on 4/8/2022 8:32:49 AM     CBC WITH AUTOMATED DIFF    Collection Time: 04/08/22  4:45 AM   Result Value Ref Range    WBC 8.7 4.4 - 11.3 K/uL    RBC 4.61 4.50 - 5.90 M/uL    HGB 10.9 (L) 13.5 - 17.5 g/dL    HCT 35.1 (L) 41 - 53 %    MCV 76.1 (L) 80 - 100 FL    MCH 23.5 (L) 31 - 34 PG    MCHC 30.9 (L) 31.0 - 36.0 g/dL    RDW 14.3 11.5 - 14.5 % PLATELET 233 306 - 896 K/uL    MPV 9.3 6.5 - 11.5 FL    NRBC 0.0  WBC    ABSOLUTE NRBC 0.00 K/uL    NEUTROPHILS 82 (H) 42 - 75 %    LYMPHOCYTES 8 (L) 20.5 - 51.1 %    MONOCYTES 8 1.7 - 9.3 %    EOSINOPHILS 1 0.9 - 2.9 %    BASOPHILS 1 0.0 - 2.5 %    ABS. NEUTROPHILS 7.2 1.8 - 7.7 K/UL    ABS. LYMPHOCYTES 0.7 (L) 1.0 - 4.8 K/UL    ABS. MONOCYTES 0.7 0.2 - 2.4 K/UL    ABS. EOSINOPHILS 0.1 0.0 - 0.7 K/UL    ABS. BASOPHILS 0.1 0.0 - 0.2 K/UL   METABOLIC PANEL, COMPREHENSIVE    Collection Time: 04/08/22  4:45 AM   Result Value Ref Range    Sodium 137 136 - 145 mmol/L    Potassium 4.0 3.5 - 5.1 mmol/L    Chloride 104 97 - 108 mmol/L    CO2 25 21 - 32 mmol/L    Anion gap 8 5 - 15 mmol/L    Glucose 294 (H) 65 - 100 mg/dL    BUN 53 (H) 6 - 20 mg/dL    Creatinine 3.71 (H) 0.70 - 1.30 mg/dL    BUN/Creatinine ratio 14 12 - 20      GFR est AA 21 (L) >60 ml/min/1.73m2    GFR est non-AA 17 (L) >60 ml/min/1.73m2    Calcium 7.9 (L) 8.5 - 10.1 mg/dL    Bilirubin, total 0.7 0.2 - 1.0 mg/dL    AST (SGOT) 18 15 - 37 U/L    ALT (SGPT) 27 12 - 78 U/L    Alk.  phosphatase 65 45 - 117 U/L    Protein, total 6.3 (L) 6.4 - 8.2 g/dL    Albumin 2.6 (L) 3.5 - 5.0 g/dL    Globulin 3.7 2.0 - 4.0 g/dL    A-G Ratio 0.7 (L) 1.1 - 2.2     TROPONIN-HIGH SENSITIVITY    Collection Time: 04/08/22  4:45 AM   Result Value Ref Range    Troponin-High Sensitivity 110 (HH) 0 - 76 ng/L   PTT    Collection Time: 04/08/22  4:45 AM   Result Value Ref Range    aPTT 29.4 21.2 - 34.1 sec    aPTT, therapeutic range   82 - 109 sec   TROPONIN-HIGH SENSITIVITY    Collection Time: 04/08/22  6:00 AM   Result Value Ref Range    Troponin-High Sensitivity 118 (HH) 0 - 76 ng/L   TROPONIN-HIGH SENSITIVITY    Collection Time: 04/08/22  8:52 AM   Result Value Ref Range    Troponin-High Sensitivity 171 (HH) 0 - 76 ng/L   PTT    Collection Time: 04/08/22  8:52 AM   Result Value Ref Range    aPTT 58.4 (H) 21.2 - 34.1 sec    aPTT, therapeutic range   82 - 109 sec   CBC WITH AUTOMATED DIFF    Collection Time: 04/08/22  8:52 AM   Result Value Ref Range    WBC 8.6 4.1 - 11.1 K/uL    RBC 4.91 4.10 - 5.70 M/uL    HGB 12.0 (L) 12.1 - 17.0 g/dL    HCT 38.8 36.6 - 50.3 %    MCV 79.0 (L) 80.0 - 99.0 FL    MCH 24.4 (L) 26.0 - 34.0 PG    MCHC 30.9 30.0 - 36.5 g/dL    RDW 13.7 11.5 - 14.5 %    PLATELET 833 572 - 070 K/uL    MPV 12.5 8.9 - 12.9 FL    NRBC 0.0 0.0  WBC    ABSOLUTE NRBC 0.00 0.00 - 0.01 K/uL    NEUTROPHILS 80 (H) 32 - 75 %    LYMPHOCYTES 11 (L) 12 - 49 %    MONOCYTES 8 5 - 13 %    EOSINOPHILS 1 0 - 7 %    BASOPHILS 0 0 - 1 %    IMMATURE GRANULOCYTES 0 0 - 0.5 %    ABS. NEUTROPHILS 6.9 1.8 - 8.0 K/UL    ABS. LYMPHOCYTES 1.0 0.8 - 3.5 K/UL    ABS. MONOCYTES 0.7 0.0 - 1.0 K/UL    ABS. EOSINOPHILS 0.1 0.0 - 0.4 K/UL    ABS. BASOPHILS 0.0 0.0 - 0.1 K/UL    ABS. IMM.  GRANS. 0.0 0.00 - 0.04 K/UL    DF AUTOMATED

## 2022-04-08 NOTE — ED PROVIDER NOTES
EMERGENCY DEPARTMENT HISTORY AND PHYSICAL EXAM        Date: 4/8/2022  Patient Name: Violeta Leigh    History of Presenting Illness     Chief Complaint   Patient presents with    Chest Pain       9:06 AM  6 history Provided By: Patient     HPI: Violeta Leigh, 54 y.o. male with PMH sig for known coronary artery disease with 3 stents on anticoagulation as well as diabetes, hypertension and acute renal insufficiency with remote MI presents to the ED with acute onset of chest pain approximately 3 AM that woke him from sleep. Patient states that the pain is left substernal and moved towards with left-sided chest pain that moved substernally. Patient took 1 nitroglycerin and aspirin with marked improvement in his pain which was almost resolved however he became concerned and came to the ED this morning for evaluation. Review of systems is negative for nausea, vomiting, diarrhea, shortness of breath, syncope, near syncope, sweating associated with his chest pain. PCP: Lyndsey Jean NP    Current Outpatient Medications   Medication Sig Dispense Refill    atorvastatin (LIPITOR) 40 mg tablet Take 1 Tablet by mouth nightly. 30 Tablet 0    furosemide (LASIX) 40 mg tablet Take 2 Tablets by mouth daily. 30 Tablet 0    furosemide (LASIX) 20 mg tablet Take 1 Tablet by mouth daily. In the evening 30 Tablet 0    carvediloL (COREG) 12.5 mg tablet Take 3 Tablets by mouth two (2) times daily (with meals).  Lantus Solostar U-100 Insulin 100 unit/mL (3 mL) inpn ADMINISTER 30 UNITS UNDER THE SKIN DAILY      aspirin delayed-release 81 mg tablet TAKE 1 TABLET BY MOUTH DAILY      ezetimibe (ZETIA) 10 mg tablet TAKE 1 TABLET BY MOUTH DAILY      hydrALAZINE (APRESOLINE) 10 mg tablet Take 20 mg by mouth three (3) times daily.  isosorbide mononitrate ER (IMDUR) 30 mg tablet TAKE 1 TABLET BY MOUTH DAILY      potassium chloride SR (KLOR-CON 10) 10 mEq tablet 20 mEq.       prasugreL (EFFIENT) 10 mg tablet TAKE 1 TABLET BY MOUTH DAILY      tamsulosin (FLOMAX) 0.4 mg capsule TAKE 1 CAPSULE BY MOUTH DAILY      HYDROcodone-acetaminophen (NORCO) 5-325 mg per tablet Dr Singh Darling nitroglycerin (NITROSTAT) 0.4 mg SL tablet ONE TABLET UNDER TONGUE AS NEEDED FOR CHEST PAIN         Past History     Past Medical History:  Past Medical History:   Diagnosis Date    Abdominal hernia 1/29/2020    Benign prostatic hyperplasia 3/16/2021    Chronic kidney disease     congenital polycytic kidneys    Congenital polycystic kidney 3/16/2021    Congestive heart failure ([facility] [address] 3/25/2020    Coronary arteriosclerosis 3/25/2020    Heart attack ([address] 3/18/2020 and 06/30/2020    History of COVID-19 3/28/2021    Hypertension     Ill-defined condition     BPH, prostatitis    Ill-defined condition     hernia    Ill-defined condition     ischemic cardiomyopathy    Ill-defined condition     (r) knee pain    Inguinal hernia     Morbid obesity ([facility] [address]     Prediabetes 3/28/2021    Spinal stenosis        Past Surgical History:  Past Surgical History:   Procedure Laterality Date    HX HERNIA REPAIR  6857    [address] hernia    OR CARDIAC SURG PROCEDURE UNLIST  06/30/2020    Stent placement       Family History:  Family History   Problem Relation Age of Onset    Hypertension Mother     Diabetes Mother     Kidney Disease Mother     Heart Disease Father     Hypertension Sister     Diabetes Brother     Kidney Disease Brother     No Known Problems Sister        Social History:  Social History     Tobacco Use    Smoking status: Never Smoker    Smokeless tobacco: Never Used   Vaping Use    Vaping Use: Never used   Substance Use Topics    Alcohol use: Not Currently    Drug use: Never       Allergies: Allergies   Allergen Reactions    Adhesive Tape-Silicones Rash     Paper tape causes  large blisters       Review of Systems   Review of Systems   Constitutional: Negative for chills, diaphoresis and fever.    HENT: Negative for congestion, sore throat and trouble swallowing. Eyes: Negative for visual disturbance. Respiratory: Positive for shortness of breath. Negative for cough. Cardiovascular: Positive for chest pain and leg swelling. Negative for palpitations. Gastrointestinal: Negative for abdominal pain, diarrhea, nausea and vomiting. Endocrine: Negative for polydipsia, polyphagia and polyuria. Genitourinary: Negative for dysuria, frequency, hematuria and urgency. Musculoskeletal: Negative for gait problem and neck pain. Skin: Negative for rash. Neurological: Negative for dizziness, syncope and headaches. Physical Exam   Physical Exam  Vitals and nursing note reviewed. Constitutional:       General: He is not in acute distress. Appearance: He is well-developed. He is not ill-appearing. Comments: Patient states that he is pain-free at this time   HENT:      Head: Normocephalic and atraumatic. Nose: Nose normal.      Mouth/Throat:      Mouth: Mucous membranes are moist.      Pharynx: No posterior oropharyngeal erythema. Eyes:      General: Vision grossly intact. Extraocular Movements: Extraocular movements intact. Conjunctiva/sclera: Conjunctivae normal.      Pupils: Pupils are equal, round, and reactive to light. Neck:      Vascular: No JVD. Trachea: No tracheal deviation. Cardiovascular:      Rate and Rhythm: Normal rate and regular rhythm. Pulses: Normal pulses. Carotid pulses are 2+ on the right side and 2+ on the left side. Radial pulses are 2+ on the right side and 2+ on the left side. Femoral pulses are 2+ on the right side and 2+ on the left side. Popliteal pulses are 2+ on the right side and 2+ on the left side. Dorsalis pedis pulses are 2+ on the right side and 2+ on the left side. Posterior tibial pulses are 2+ on the right side and 2+ on the left side. Heart sounds: Normal heart sounds.    Pulmonary:      Effort: Pulmonary effort is normal.      Breath sounds: Normal breath sounds and air entry. No wheezing or rhonchi. Abdominal:      General: Bowel sounds are normal.      Palpations: Abdomen is soft. Tenderness: There is no abdominal tenderness. There is no guarding or rebound. Musculoskeletal:         General: No swelling or deformity. Normal range of motion. Right shoulder: No swelling. Normal range of motion. Cervical back: Neck supple. Right lower leg: Edema present. Left lower leg: Edema present. Skin:     General: Skin is warm and dry. Capillary Refill: Capillary refill takes less than 2 seconds. Findings: No signs of injury or rash. Neurological:      General: No focal deficit present. Mental Status: He is alert and oriented to person, place, and time. Psychiatric:         Behavior: Behavior is cooperative. Diagnostic Study Results     Labs -     No results found for this or any previous visit (from the past 48 hour(s)). Radiologic Studies -   No orders to display     CT Results  (Last 48 hours)    None        CXR Results  (Last 48 hours)    None          Medical Decision Making and ED Course     I have also reviewed the vital signs, available nursing notes, past medical history, past surgical history, family history and social history as made available. Vital Signs - Reviewed the patient's vital signs. Medical Decision Making/Diff Dx:  Differential diagnosis: Unstable angina, cute coronary syndrome, STEMI, NSTEMI, CHF, fluid overload, acute renal failure, acute renal insufficiency electrolyte abnormalities      MDM:     24-year-old with significant cardiovascular disease despite his age with stents and MI as well as acute renal insufficiency and hypertension presents with acute onset of chest pain concerning for the possibility of acute coronary injury. And/CHF. We will do screening labs chest x-ray and planned admission.     ED course/Re-evaluation/Consultations/Other Procedures     PROCEDURES:  Procedures  Dae Solano MD      Disposition     Admitted        Diagnosis     Clinical impression:   1. Acute chest pain    2. Unstable angina (HCC)    3. Elevated troponin    4. Peripheral edema    5. Hypervolemia, unspecified hypervolemia type    6.  Acute renal insufficiency       :

## 2022-04-08 NOTE — ED TRIAGE NOTES
Pt present from lobby for chest pain that pt states started about 3am this morning that woke him up from his sleep. Pt states 8/10 pressure pain, does not radiate, that comes and goes. Pt took 3 baby aspirins 1 dose of nitroglycerin prior to arrival which pt states gave him a little bit of relief.

## 2022-04-08 NOTE — ROUTINE PROCESS
TRANSFER - OUT REPORT:    Verbal report given to [address] on [facility]  being transferred to  for routine progression of care       Report consisted of patients Situation, Background, Assessment and   Recommendations(SBAR). Information from the following report(s) SBAR was reviewed with the receiving nurse. Lines:   Peripheral IV 04/08/22 Left Antecubital (Active)   Site Assessment Clean, dry, & intact 04/08/22 1132   Phlebitis Assessment 0 04/08/22 1132   Infiltration Assessment 0 04/08/22 1132   Dressing Status Clean, dry, & intact 04/08/22 1132   Dressing Type Transparent;Tape 04/08/22 1132   Hub Color/Line Status Pink 04/08/22 1132       Peripheral IV 04/08/22 Right Forearm (Active)   Site Assessment Clean, dry, & intact 04/08/22 1132   Phlebitis Assessment 0 04/08/22 1132   Infiltration Assessment 0 04/08/22 1132   Dressing Status Clean, dry, & intact 04/08/22 1132   Dressing Type Transparent;Tape 04/08/22 1132   Hub Color/Line Status Pink 04/08/22 1132        Opportunity for questions and clarification was provided.       Patient transported with:   Registered Nurse

## 2022-04-08 NOTE — Clinical Note
Contrast Dose Calculator:   Patient's age: 54.   Patient's sex: Male. Patient weight (kg) = 115.2. Creatinine level (mg/dL) = 3.71. Creatinine clearance (mL/min): 39.25. Contrast concentration (mg/mL) = 370. MACD = 125.88 mL. Max Contrast dose per Creatinine Cl calculator = 88.3 mL.

## 2022-04-09 VITALS
TEMPERATURE: 98.2 F | HEART RATE: 80 BPM | BODY MASS INDEX: 31.58 KG/M2 | HEIGHT: 75 IN | SYSTOLIC BLOOD PRESSURE: 126 MMHG | RESPIRATION RATE: 18 BRPM | WEIGHT: 254 LBS | DIASTOLIC BLOOD PRESSURE: 81 MMHG | OXYGEN SATURATION: 100 %

## 2022-04-09 LAB
ANION GAP SERPL CALC-SCNC: 8 MMOL/L (ref 5–15)
APTT PPP: 25.4 SEC (ref 21.2–34.1)
APTT PPP: 28.8 SEC (ref 21.2–34.1)
ATRIAL RATE: 83 BPM
BASOPHILS # BLD: 0 K/UL (ref 0–0.1)
BASOPHILS NFR BLD: 0 % (ref 0–1)
BUN SERPL-MCNC: 51 MG/DL (ref 6–20)
BUN/CREAT SERPL: 14 (ref 12–20)
CA-I BLD-MCNC: 8.6 MG/DL (ref 8.5–10.1)
CALCULATED P AXIS, ECG09: 46 DEGREES
CALCULATED R AXIS, ECG10: -2 DEGREES
CALCULATED T AXIS, ECG11: -162 DEGREES
CHLORIDE SERPL-SCNC: 105 MMOL/L (ref 97–108)
CO2 SERPL-SCNC: 27 MMOL/L (ref 21–32)
CREAT SERPL-MCNC: 3.62 MG/DL (ref 0.7–1.3)
DIAGNOSIS, 93000: NORMAL
DIFFERENTIAL METHOD BLD: ABNORMAL
EOSINOPHIL # BLD: 0.1 K/UL (ref 0–0.4)
EOSINOPHIL NFR BLD: 2 % (ref 0–7)
ERYTHROCYTE [DISTWIDTH] IN BLOOD BY AUTOMATED COUNT: 13.5 % (ref 11.5–14.5)
GLUCOSE BLD STRIP.AUTO-MCNC: 176 MG/DL (ref 65–117)
GLUCOSE BLD STRIP.AUTO-MCNC: 182 MG/DL (ref 65–117)
GLUCOSE SERPL-MCNC: 191 MG/DL (ref 65–100)
HCT VFR BLD AUTO: 32.6 % (ref 36.6–50.3)
HGB BLD-MCNC: 9.9 G/DL (ref 12.1–17)
IMM GRANULOCYTES # BLD AUTO: 0 K/UL (ref 0–0.04)
IMM GRANULOCYTES NFR BLD AUTO: 0 % (ref 0–0.5)
LYMPHOCYTES # BLD: 0.9 K/UL (ref 0.8–3.5)
LYMPHOCYTES NFR BLD: 12 % (ref 12–49)
MCH RBC QN AUTO: 23.9 PG (ref 26–34)
MCHC RBC AUTO-ENTMCNC: 30.4 G/DL (ref 30–36.5)
MCV RBC AUTO: 78.7 FL (ref 80–99)
MONOCYTES # BLD: 0.8 K/UL (ref 0–1)
MONOCYTES NFR BLD: 10 % (ref 5–13)
NEUTS SEG # BLD: 5.5 K/UL (ref 1.8–8)
NEUTS SEG NFR BLD: 76 % (ref 32–75)
NRBC # BLD: 0 K/UL (ref 0–0.01)
NRBC BLD-RTO: 0 PER 100 WBC
P-R INTERVAL, ECG05: 194 MS
PERFORMED BY, TECHID: ABNORMAL
PERFORMED BY, TECHID: ABNORMAL
PLATELET # BLD AUTO: 177 K/UL (ref 150–400)
PMV BLD AUTO: 11.3 FL (ref 8.9–12.9)
POTASSIUM SERPL-SCNC: 3.5 MMOL/L (ref 3.5–5.1)
Q-T INTERVAL, ECG07: 404 MS
QRS DURATION, ECG06: 120 MS
QTC CALCULATION (BEZET), ECG08: 474 MS
RBC # BLD AUTO: 4.14 M/UL (ref 4.1–5.7)
SODIUM SERPL-SCNC: 140 MMOL/L (ref 136–145)
THERAPEUTIC RANGE,PTTT: NORMAL SEC (ref 82–109)
THERAPEUTIC RANGE,PTTT: NORMAL SEC (ref 82–109)
VENTRICULAR RATE, ECG03: 83 BPM
WBC # BLD AUTO: 7.3 K/UL (ref 4.1–11.1)

## 2022-04-09 PROCEDURE — 85025 COMPLETE CBC W/AUTO DIFF WBC: CPT

## 2022-04-09 PROCEDURE — 36415 COLL VENOUS BLD VENIPUNCTURE: CPT

## 2022-04-09 PROCEDURE — 80048 BASIC METABOLIC PNL TOTAL CA: CPT

## 2022-04-09 PROCEDURE — 74011250637 HC RX REV CODE- 250/637: Performed by: LEGAL MEDICINE

## 2022-04-09 PROCEDURE — 74011250637 HC RX REV CODE- 250/637: Performed by: INTERNAL MEDICINE

## 2022-04-09 PROCEDURE — 74011000250 HC RX REV CODE- 250: Performed by: INTERNAL MEDICINE

## 2022-04-09 PROCEDURE — 74011000250 HC RX REV CODE- 250: Performed by: STUDENT IN AN ORGANIZED HEALTH CARE EDUCATION/TRAINING PROGRAM

## 2022-04-09 PROCEDURE — 82962 GLUCOSE BLOOD TEST: CPT

## 2022-04-09 PROCEDURE — 74011636637 HC RX REV CODE- 636/637: Performed by: INTERNAL MEDICINE

## 2022-04-09 PROCEDURE — 83036 HEMOGLOBIN GLYCOSYLATED A1C: CPT

## 2022-04-09 PROCEDURE — 85730 THROMBOPLASTIN TIME PARTIAL: CPT

## 2022-04-09 RX ORDER — FUROSEMIDE 40 MG/1
80 TABLET ORAL DAILY
Qty: 30 TABLET | Refills: 0 | Status: SHIPPED
Start: 2022-04-09

## 2022-04-09 RX ORDER — ATORVASTATIN CALCIUM 40 MG/1
40 TABLET, FILM COATED ORAL
Qty: 30 TABLET | Refills: 0 | Status: SHIPPED | OUTPATIENT
Start: 2022-04-09 | End: 2022-08-04 | Stop reason: ALTCHOICE

## 2022-04-09 RX ORDER — FUROSEMIDE 20 MG/1
20 TABLET ORAL DAILY
Qty: 30 TABLET | Refills: 0 | Status: SHIPPED
Start: 2022-04-09 | End: 2022-08-21 | Stop reason: ALTCHOICE

## 2022-04-09 RX ORDER — POTASSIUM CHLORIDE 1.5 G/1.77G
20 POWDER, FOR SOLUTION ORAL
Status: COMPLETED | OUTPATIENT
Start: 2022-04-09 | End: 2022-04-09

## 2022-04-09 RX ADMIN — SODIUM CHLORIDE, PRESERVATIVE FREE 10 ML: 5 INJECTION INTRAVENOUS at 06:46

## 2022-04-09 RX ADMIN — SODIUM CHLORIDE, PRESERVATIVE FREE 10 ML: 5 INJECTION INTRAVENOUS at 00:11

## 2022-04-09 RX ADMIN — SODIUM CHLORIDE, PRESERVATIVE FREE 10 ML: 5 INJECTION INTRAVENOUS at 00:30

## 2022-04-09 RX ADMIN — CARVEDILOL 37.5 MG: 12.5 TABLET, FILM COATED ORAL at 08:24

## 2022-04-09 RX ADMIN — ASPIRIN 81 MG: 81 TABLET, COATED ORAL at 12:05

## 2022-04-09 RX ADMIN — HYDRALAZINE HYDROCHLORIDE 20 MG: 10 TABLET, FILM COATED ORAL at 08:24

## 2022-04-09 RX ADMIN — POTASSIUM CHLORIDE 20 MEQ: 1.5 POWDER, FOR SOLUTION ORAL at 12:05

## 2022-04-09 RX ADMIN — EZETIMIBE 10 MG: 10 TABLET ORAL at 08:24

## 2022-04-09 RX ADMIN — INSULIN GLARGINE 30 UNITS: 100 INJECTION, SOLUTION SUBCUTANEOUS at 08:25

## 2022-04-09 RX ADMIN — INSULIN LISPRO 2 UNITS: 100 INJECTION, SOLUTION INTRAVENOUS; SUBCUTANEOUS at 08:25

## 2022-04-09 RX ADMIN — INSULIN LISPRO 2 UNITS: 100 INJECTION, SOLUTION INTRAVENOUS; SUBCUTANEOUS at 12:07

## 2022-04-09 RX ADMIN — FUROSEMIDE 20 MG: 40 TABLET ORAL at 12:14

## 2022-04-09 RX ADMIN — PRASUGREL 10 MG: 10 TABLET, FILM COATED ORAL at 12:07

## 2022-04-09 RX ADMIN — TAMSULOSIN HYDROCHLORIDE 0.4 MG: 0.4 CAPSULE ORAL at 08:25

## 2022-04-09 RX ADMIN — FUROSEMIDE 80 MG: 40 TABLET ORAL at 08:24

## 2022-04-09 RX ADMIN — ISOSORBIDE MONONITRATE 30 MG: 30 TABLET, EXTENDED RELEASE ORAL at 08:24

## 2022-04-09 NOTE — DISCHARGE SUMMARY
Hospitalist Discharge Summary     Patient ID:  Isaiah Willson  [id number]  83 y.o.  1970 4/8/2022    PCP on record: Patty Simons NP    Admit date: 4/8/2022  Discharge date and time: 4/9/2022    DISCHARGE DIAGNOSIS:  Acute chest pain  NSTEMI  CKD  CHFrEF  Hypertension        CONSULTATIONS:  IP CONSULT TO HOSPITALIST  IP CONSULT TO CARDIOLOGY  IP CONSULT TO NEPHROLOGY    Excerpted HPI from H&P of Rodríguez Bradshaw MD:  Isaiah Willson is a 54 y.o. With hx of type II diabetes, CKD, CAD s/p cardiac stentX3, CHFrEF(EF 15% on 1/28/22) presented to ED with c/o chest pain. Patient reports that chest pain started around 3 am this morning, chest pain was 10/10 in intensity, sub sternal, non radiating, no aggravating factor, relieved by nitroglycerin. Not associated with shortness of breath, diaphoresis, nausea, vomiting.     ______________________________________________________________________  DISCHARGE SUMMARY/HOSPITAL COURSE:  for full details see H&P, daily progress notes, labs, consult notes. Patient was started on heparin drip. Cardiology was consulted- started on nitroglycerin drip. Cardiac cath was done on 4/8/22- RPDA 95% stenosed, s/p PCI. Chest pain resolved. Patient feels better. Patient has CKD, Creatinine post Cath is stable. Patient is discharged with outpatient PCP, cardiology and nephrology follow up.            _______________________________________________________________________  Patient seen and examined by me on discharge day. Pertinent Findings:  Gen:    Not in distress  Chest: Clear lungs  CVS:   Regular rhythm. No edema  Abd:  Soft, not distended, not tender  Neuro:  Alert, oriented  _______________________________________________________________________  DISCHARGE MEDICATIONS:   Current Discharge Medication List      CONTINUE these medications which have CHANGED    Details   atorvastatin (LIPITOR) 40 mg tablet Take 1 Tablet by mouth nightly.   Qty: 30 Tablet, Refills: 0  Start date: 4/9/2022 !! furosemide (LASIX) 40 mg tablet Take 2 Tablets by mouth daily. Qty: 30 Tablet, Refills: 0  Start date: 4/9/2022      !! furosemide (LASIX) 20 mg tablet Take 1 Tablet by mouth daily. In the evening  Qty: 30 Tablet, Refills: 0  Start date: 4/9/2022       !! - Potential duplicate medications found. Please discuss with provider. CONTINUE these medications which have NOT CHANGED    Details   carvediloL (COREG) 12.5 mg tablet Take 3 Tablets by mouth two (2) times daily (with meals). Lantus Solostar U-100 Insulin 100 unit/mL (3 mL) inpn ADMINISTER 30 UNITS UNDER THE SKIN DAILY      aspirin delayed-release 81 mg tablet TAKE 1 TABLET BY MOUTH DAILY      ezetimibe (ZETIA) 10 mg tablet TAKE 1 TABLET BY MOUTH DAILY      hydrALAZINE (APRESOLINE) 10 mg tablet Take 20 mg by mouth three (3) times daily. isosorbide mononitrate ER (IMDUR) 30 mg tablet TAKE 1 TABLET BY MOUTH DAILY      potassium chloride SR (KLOR-CON 10) 10 mEq tablet 20 mEq.      prasugreL (EFFIENT) 10 mg tablet TAKE 1 TABLET BY MOUTH DAILY      tamsulosin (FLOMAX) 0.4 mg capsule TAKE 1 CAPSULE BY MOUTH DAILY      HYDROcodone-acetaminophen (NORCO) 5-325 mg per tablet Dr Susana Urena      nitroglycerin (NITROSTAT) 0.4 mg SL tablet ONE TABLET UNDER TONGUE AS NEEDED FOR CHEST PAIN         STOP taking these medications       insulin aspart U-100 (NOVOLOG) 100 unit/mL (3 mL) inpn Comments:   Reason for Stopping:                 Patient Follow Up Instructions: Activity: Activity as tolerated  Diet: Cardiac Diet, renal diet  Wound Care: None needed    Follow-up with PCP, cardiology and nephrology in 1 week.   Follow-up tests/labs Creatinine  Follow-up Information     Follow up With Specialties Details Why [address] [facility], NP Physician Assistant   [address] [facility]  [phone]          ________________________________________________________________    Risk of deterioration: Moderate    Condition at Discharge: Stable  __________________________________________________________________    Disposition  Home with family, no needs    ____________________________________________________________________    Code Status: Full Code  ___________________________________________________________________      Total time in minutes spent coordinating this discharge (includes going over instructions, follow-up, prescriptions, and preparing report for sign off to her PCP) :  35 minutes    Signed:  Serge Martell MD

## 2022-04-09 NOTE — PROGRESS NOTES
Progress Note    Patient: Mitchel Schulz MRN: [id number]  SSN: [id number]    YOB: 1970  Age: 54 y.o. Sex: male      Admit Date: 4/8/2022    LOS: 1 day     Subjective:     Patient seen and examined. He is being followed for chest pain. Cardiac catheterization was completed yesterday with placement of one stent. The patient denies any current symptoms and right radial cath site is free of any bruising or hematoma. Creatinine is stable at 3.62 this AM. He is stable for discharge from a cardiology standpoint. Telemetry Review: Sinus rhythm 72    Review of Symptoms:   Review of Systems   All other systems reviewed and are negative. Objective:     Vitals:    04/09/22 0400 04/09/22 0800 04/09/22 0803 04/09/22 1233   BP:   (!) 140/98 126/81   Pulse: 78 74 74 80   Resp:   18 18   Temp:   98.4 °F (36.9 °C) 98.2 °F (36.8 °C)   SpO2:   97% 100%   Weight:       Height:            Intake and Output:  Current Shift: No intake/output data recorded. Last three shifts: 04/07 1901 - 04/09 0700  In: -   Out: 56 [Urine:890]    Physical Exam:   General: Well nourished, NAD, A&O  HEENT: Normocephalic, PERRL, no drainage  Neck: Supple, Trachea midline, No JVD  RESP: CTA bilaterally. + Symmetrical chest movement. No SOB or distress. On RA  Cardiovascular: RRR no MRG  PVS: No rubor, cyanosis, mild BLE edema, Radial, DP, PT pulses equal bilaterally  ABD:  soft, NT, Normoactive BS  Derm: Warm/Dry/Intact with no lesions, good turgor; right radial cath site without bleeding, bruising or hematoma  Neuro: A&O PPTS, cranial nerves II- XII grossly intact via interaction with patient.  No focal deficits  PSYCH: No anxiety or agitation      Lab/Data Review:  BMP:   Lab Results   Component Value Date/Time     04/09/2022 02:58 AM    K 3.5 04/09/2022 02:58 AM     04/09/2022 02:58 AM    CO2 27 04/09/2022 02:58 AM    AGAP 8 04/09/2022 02:58 AM     (H) 04/09/2022 02:58 AM    BUN 51 (H) 04/09/2022 02:58 AM CREA 3.62 (H) 04/09/2022 02:58 AM    GFRAA 22 (L) 04/09/2022 02:58 AM    GFRNA 18 (L) 04/09/2022 02:58 AM            Current Facility-Administered Medications:     heparin 25,000 units in D5W 250 ml infusion, 8-25 Units/kg/hr, IntraVENous, TITRATE, Anthony Ga MD, Last Rate: 11.5 mL/hr at 04/08/22 1351, 10 Units/kg/hr at 04/08/22 1351    heparin (porcine) 1,000 unit/mL injection 4,000 Units, 4,000 Units, IntraVENous, PRN **OR** heparin (porcine) 1,000 unit/mL injection 2,000 Units, 2,000 Units, IntraVENous, PRN, Anthony Ga MD, 2,000 Units at 04/08/22 1347    sodium chloride (NS) flush 5-40 mL, 5-40 mL, IntraVENous, Q8H, Fernanda Vega MD, 10 mL at 04/09/22 0646    sodium chloride (NS) flush 5-40 mL, 5-40 mL, IntraVENous, PRN, Anthony Ga MD    acetaminophen (TYLENOL) tablet 650 mg, 650 mg, Oral, Q6H PRN, 650 mg at 04/08/22 2052 **OR** acetaminophen (TYLENOL) suppository 650 mg, 650 mg, Rectal, Q6H PRN, Anthony Ga MD    polyethylene glycol (MIRALAX) packet 17 g, 17 g, Oral, DAILY PRN, Anthony Ga MD    ondansetron (ZOFRAN ODT) tablet 4 mg, 4 mg, Oral, Q8H PRN **OR** ondansetron (ZOFRAN) injection 4 mg, 4 mg, IntraVENous, Q6H PRN, Anthony Ga MD    aspirin delayed-release tablet 81 mg, 81 mg, Oral, DAILY, Fernanda Vega MD, 81 mg at 04/09/22 1205    carvediloL (COREG) tablet 37.5 mg, 37.5 mg, Oral, BID WITH MEALS, Fernanda Vega MD, 37.5 mg at 04/09/22 2836    ezetimibe (ZETIA) tablet 10 mg, 10 mg, Oral, DAILY, Fernanda Vega MD, 10 mg at 04/09/22 7913    hydrALAZINE (APRESOLINE) tablet 20 mg, 20 mg, Oral, TID, Anthony Ga MD, 20 mg at 04/09/22 0311    isosorbide mononitrate ER (IMDUR) tablet 30 mg, 30 mg, Oral, DAILY, Fernanda Vega MD, 30 mg at 04/09/22 0824    nitroglycerin (NITROSTAT) tablet 0.4 mg, 0.4 mg, SubLINGual, PRN, Anthony Ga MD    prasugreL (EFFIENT) tablet 10 mg, 10 mg, Oral, DAILY, Anthony Ga MD, 10 mg at 04/09/22 1207   tamsulosin (FLOMAX) capsule 0.4 mg, 0.4 mg, Oral, DAILY, Fernanda Vega MD, 0.4 mg at 04/09/22 0825    furosemide (LASIX) tablet 80 mg, 80 mg, Oral, DAILY, Fernanda Vega MD, 80 mg at 04/09/22 0824    glucose chewable tablet 16 g, 4 Tablet, Oral, PRN, Enrrique Herrmann MD    dextrose 10% infusion 0-250 mL, 0-250 mL, IntraVENous, PRN, Enrrique Herrmann MD    glucagon (GLUCAGEN) injection 1 mg, 1 mg, IntraMUSCular, PRN, Enrrique Herrmann MD    insulin glargine (LANTUS) injection 30 Units, 30 Units, SubCUTAneous, DAILY, Fernanda Vega MD, 30 Units at 04/09/22 0825    insulin lispro (HUMALOG) injection, , SubCUTAneous, TIDAC, Fernanda Vega MD, 2 Units at 04/09/22 1207    HYDROcodone-acetaminophen (NORCO) 5-325 mg per tablet 1 Tablet, 1 Tablet, Oral, BID PRN, Enrrique Herrmann MD    furosemide (LASIX) tablet 20 mg, 20 mg, Oral, Q24H, Enrrique Herrmann MD, 20 mg at 04/09/22 1214    atorvastatin (LIPITOR) tablet 40 mg, 40 mg, Oral, QHS, Enrrique Herrmann MD, 40 mg at 04/08/22 2052    sodium chloride (NS) flush 5-40 mL, 5-40 mL, IntraVENous, Q8H, Shelley Fish MD, 10 mL at 04/09/22 0646    sodium chloride (NS) flush 5-40 mL, 5-40 mL, IntraVENous, PRN, Shelley Fish MD      Assessment:     Active Problems:    Chest pain (4/8/2022)        Plan:     Case discussed with Collaborating physician Dr. Clyde Zavala and our recommendations are as follows:     1. Elevated troponin:   - patient with active chest pain that started at 0300 yesterday morning, patient 9/10, self administered 324 asa and nitrol SL x 1 dose, pain improved to 6/10, started on nitro gtt 10 mcg/min and transferred to [facility] for higher level of care. - HS troponin currently 171, repeat pending. Troponin elevation in the setting of CKD r/t polycystic kidney disease and HFrEF with volume overload. - Cardiac catheterization 4/8/22 with one PAVEL placed to RPDA.  May need to consider staged PCI for residual disease should symptoms continue.   -Continue ASA and Effient      2. NSTEMI with hx of ICM:   - 11/8/21: [facility] with in stent thrombosis to the LAD, lesion to the RPLB and  to the PDA  - 6/20 s/p PAVEL to mid/distal LCx   - 3/20 PCI to LAD  - 4/8/22 PAVEL to RPDA  - continue asa and prasugrel 10 mg daily     3. Congestive heart failure with reduced ejection fraction (HFrEF):   - 01/22 echo: EF 10-15%, moderately dilated LV,  grade III diastolic dysfunction, severe global hypokinesis, Moderate MR.   - appreciate nephrology recommendations for diuresis, currently on lasix 40 mg in the am and lunchtime, an additional dose of 20 mg at bedtime.   - hold ACE/ARB/ARNi history of polycystic kidney disease, Cr 3.71        4. Coronary artery disease:   - continue asa 81 mg daily, atorvastatin 40 mg daily, coreg 37.5 mg BID     5. Uncontrolled hypertension:   - continue hydralazine 10 mg TID, Coreg 37.5 mg BID, lasix 100 mg daily     6. Polycystic kidney disease:   - Nephrology following   - total of 100 mg lasix daily, Cr 3.71, according to patient this is his baseline Cr.  -Cr 3.62 following cath yesterday    **Patient is stable for discharge from cardiology standpoint**     Thank you for involving us in the care of this patient. Please do not hesitate to call if additional questions arise.  If after hours please call [phone]    Signed By: Melo Jonh NP     April 9, 2022

## 2022-04-09 NOTE — CONSULTS
Renal Consult Note    Admit Date: 4/8/2022      HPI:   Dionisio Al is a 54 yr old male with TYPE-2 D.M, known CKD and CAD s/p Cardiac stents x3   Required emergency cardiac cath yesterday . Renal consult is requested yesterday but I wasn't notified ,hence pt is seen and examined today the patient was followed by Jennifer Rosa until jan 2022,pt is known to have ckd for almost 18 yrs. Pt denies sob ,chest pain ,orthopnea ,swelling in lower ext . Pt denies having nausea ,vomiting and abd discomfort. Vs Stable. So far creat remains stable. Pt can keep up with increased fluid intake and c/w lasix 40 mg daily until he is seen for follow up next week .   Visit Vitals  BP (!) 140/98 (BP 1 Location: Left upper arm, BP Patient Position: Sitting)   Pulse 74   Temp 98.4 °F (36.9 °C)   Resp 18   Ht 6' 3\" (1.905 m)   Wt 115.2 kg (254 lb)   SpO2 97%   BMI 31.75 kg/m²        Current Facility-Administered Medications   Medication Dose Route Frequency    heparin 25,000 units in D5W 250 ml infusion  8-25 Units/kg/hr IntraVENous TITRATE    heparin (porcine) 1,000 unit/mL injection 4,000 Units  4,000 Units IntraVENous PRN    Or    heparin (porcine) 1,000 unit/mL injection 2,000 Units  2,000 Units IntraVENous PRN    sodium chloride (NS) flush 5-40 mL  5-40 mL IntraVENous Q8H    sodium chloride (NS) flush 5-40 mL  5-40 mL IntraVENous PRN    acetaminophen (TYLENOL) tablet 650 mg  650 mg Oral Q6H PRN    Or    acetaminophen (TYLENOL) suppository 650 mg  650 mg Rectal Q6H PRN    polyethylene glycol (MIRALAX) packet 17 g  17 g Oral DAILY PRN    ondansetron (ZOFRAN ODT) tablet 4 mg  4 mg Oral Q8H PRN    Or    ondansetron (ZOFRAN) injection 4 mg  4 mg IntraVENous Q6H PRN    aspirin delayed-release tablet 81 mg  81 mg Oral DAILY    carvediloL (COREG) tablet 37.5 mg  37.5 mg Oral BID WITH MEALS    ezetimibe (ZETIA) tablet 10 mg  10 mg Oral DAILY    hydrALAZINE (APRESOLINE) tablet 20 mg  20 mg Oral TID    isosorbide mononitrate ER (IMDUR) tablet 30 mg  30 mg Oral DAILY    nitroglycerin (NITROSTAT) tablet 0.4 mg  0.4 mg SubLINGual PRN    prasugreL (EFFIENT) tablet 10 mg  10 mg Oral DAILY    tamsulosin (FLOMAX) capsule 0.4 mg  0.4 mg Oral DAILY    furosemide (LASIX) tablet 80 mg  80 mg Oral DAILY    glucose chewable tablet 16 g  4 Tablet Oral PRN    dextrose 10% infusion 0-250 mL  0-250 mL IntraVENous PRN    glucagon (GLUCAGEN) injection 1 mg  1 mg IntraMUSCular PRN    insulin glargine (LANTUS) injection 30 Units  30 Units SubCUTAneous DAILY    insulin lispro (HUMALOG) injection   SubCUTAneous TIDAC    HYDROcodone-acetaminophen (NORCO) 5-325 mg per tablet 1 Tablet  1 Tablet Oral BID PRN    furosemide (LASIX) tablet 20 mg  20 mg Oral Q24H    atorvastatin (LIPITOR) tablet 40 mg  40 mg Oral QHS    sodium chloride (NS) flush 5-40 mL  5-40 mL IntraVENous Q8H    sodium chloride (NS) flush 5-40 mL  5-40 mL IntraVENous PRN        Past Medical History:   Diagnosis Date    Abdominal hernia 1/29/2020    Benign prostatic hyperplasia 3/16/2021    Chronic kidney disease     congenital polycytic kidneys    Congenital polycystic kidney 3/16/2021    Congestive heart failure ([address] 3/25/2020    Coronary arteriosclerosis 3/25/2020    Heart attack ([address] 3/18/2020 and 06/30/2020    History of COVID-19 3/28/2021    Hypertension     Ill-defined condition     BPH, prostatitis    Ill-defined condition     hernia    Ill-defined condition     ischemic cardiomyopathy    Ill-defined condition     (r) knee pain    Inguinal hernia     Morbid obesity ([address]     Prediabetes 3/28/2021    Spinal stenosis       Past Surgical History:   Procedure Laterality Date    HX HERNIA REPAIR  3861    umbiblical hernia    RI CARDIAC SURG PROCEDURE UNLIST  06/30/2020    Stent placement     Family History   Problem Relation Age of Onset    Hypertension Mother     Diabetes Mother     Kidney Disease Mother     Heart Disease Father     Hypertension Sister    Ashley Pro Diabetes Brother     Kidney Disease Brother     No Known Problems Sister       Social History     Tobacco Use    Smoking status: Never Smoker    Smokeless tobacco: Never Used   Substance Use Topics    Alcohol use: Not Currently         Review of Systems    Review of Systems   Constitutional: Negative for chills, fever and weight loss. HENT: Negative for hearing loss. Eyes: Negative for blurred vision. Respiratory: Negative for cough, sputum production, shortness of breath and wheezing. Cardiovascular: Negative for chest pain, palpitations, orthopnea and leg swelling. Gastrointestinal: Negative for abdominal pain, nausea and vomiting. Genitourinary: Negative for dysuria and frequency. Skin: Negative for itching and rash. Neurological: Negative for dizziness and weakness. Endo/Heme/Allergies: Does not bruise/bleed easily. Physical Exam:     Physical Exam  Constitutional:       General: He is not in acute distress. Appearance: Normal appearance. He is obese. HENT:      Head: Normocephalic and atraumatic. Mouth/Throat:      Mouth: Mucous membranes are moist.   Eyes:      Conjunctiva/sclera: Conjunctivae normal.      Pupils: Pupils are equal, round, and reactive to light. Cardiovascular:      Rate and Rhythm: Normal rate and regular rhythm. Heart sounds: No murmur heard. Pulmonary:      Effort: Pulmonary effort is normal. No respiratory distress. Breath sounds: Normal breath sounds. No wheezing or rales. Abdominal:      General: Bowel sounds are normal. There is distension. Palpations: Abdomen is soft. Tenderness: There is no abdominal tenderness. Musculoskeletal:         General: No swelling. Normal range of motion. Cervical back: Neck supple. Right lower leg: No edema. Left lower leg: No edema. Skin:     General: Skin is warm and dry. Findings: No bruising, erythema, lesion or rash.    Neurological:      General: No focal deficit present. Mental Status: He is alert and oriented to person, place, and time. Psychiatric:         Mood and Affect: Mood normal.           Patient Vitals for the past 8 hrs:   BP Temp Pulse Resp SpO2   04/09/22 0803 (!) 140/98 98.4 °F (36.9 °C) 74 18 97 %   04/09/22 0400 -- -- 78 -- --   04/09/22 0338 117/83 98 °F (36.7 °C) 78 20 99 %     No intake/output data recorded. 04/07 1901 - 04/09 0700  In: -   Out: 890 [Urine:890]          No orders to display        Data Review   Recent Labs     04/09/22  0258 04/08/22  0852 04/08/22  0445   WBC 7.3 8.6 8.7   HGB 9.9* 12.0* 10.9*   HCT 32.6* 38.8 35.1*    254 184     Admission on 04/08/2022   Component Date Value Ref Range Status    Troponin-High Sensitivity 04/08/2022 171* 0 - 76 ng/L Final    CALLED TO AND READ BACK BY JEFFREY ALVARADO RN ON 4/8/2022 @0955/[facility]    aPTT 04/08/2022 58.4* 21.2 - 34.1 sec Final    PLEASE NOTE NEW REFERENCE RANGE    aPTT, therapeutic range 04/08/2022    82 - 109 sec Final    WBC 04/08/2022 8.6  4.1 - 11.1 K/uL Final    RBC 04/08/2022 4.91  4.10 - 5.70 M/uL Final    HGB 04/08/2022 12.0* 12.1 - 17.0 g/dL Final    HCT 04/08/2022 38.8  36.6 - 50.3 % Final    MCV 04/08/2022 79.0* 80.0 - 99.0 FL Final    MCH 04/08/2022 24.4* 26.0 - 34.0 PG Final    MCHC 04/08/2022 30.9  30.0 - 36.5 g/dL Final    RDW 04/08/2022 13.7  11.5 - 14.5 % Final    PLATELET 25/98/2888 096  150 - 400 K/uL Final    MPV 04/08/2022 12.5  8.9 - 12.9 FL Final    NRBC 04/08/2022 0.0  0.0  WBC Final    ABSOLUTE NRBC 04/08/2022 0.00  0.00 - 0.01 K/uL Final    NEUTROPHILS 04/08/2022 80* 32 - 75 % Final    LYMPHOCYTES 04/08/2022 11* 12 - 49 % Final    MONOCYTES 04/08/2022 8  5 - 13 % Final    EOSINOPHILS 04/08/2022 1  0 - 7 % Final    BASOPHILS 04/08/2022 0  0 - 1 % Final    IMMATURE GRANULOCYTES 04/08/2022 0  0 - 0.5 % Final    ABS. NEUTROPHILS 04/08/2022 6.9  1.8 - 8.0 K/UL Final    ABS.  LYMPHOCYTES 04/08/2022 1.0  0.8 - 3.5 K/UL Final  ABS. MONOCYTES 04/08/2022 0.7  0.0 - 1.0 K/UL Final    ABS. EOSINOPHILS 04/08/2022 0.1  0.0 - 0.4 K/UL Final    ABS. BASOPHILS 04/08/2022 0.0  0.0 - 0.1 K/UL Final    ABS. IMM. GRANS. 04/08/2022 0.0  0.00 - 0.04 K/UL Final    DF 04/08/2022 AUTOMATED    Final    aPTT 04/08/2022 41.8* 21.2 - 34.1 sec Final    PLEASE NOTE NEW REFERENCE RANGE Investigated per delta check protocol    aPTT, therapeutic range 04/08/2022    sec Final    Troponin-High Sensitivity 04/08/2022 406* 0 - 76 ng/L Final    Investigated per delta check protocol CALLED TO AND READ BACK BY JEFFREY ALVARADO RN ON 4/8/2022 @1422/[facility]    Glucose (POC) 04/08/2022 194* 65 - 117 mg/dL Final    Comment: The Accu-Chek Inform II glucometer is not FDA cleared for critically ill patient use. A study was performed validating the equivalence of glucometer and clinical laboratory results on this patient population. Despite the study, use of glucometers with capillary specimens from critically ill patients, regardless of their location, makes the test high complexity and requires the performing individual to comply with CLIA requirements more stringent than those for waived testing in the hospital setting. Critical thinking skills are necessary to determine a potentially critically ill patients status prior to using a glucometer.       Performed by 04/08/2022 EV MANJARREZ   Final    Ventricular Rate 04/08/2022 83  BPM Final    Atrial Rate 04/08/2022 83  BPM Final    P-R Interval 04/08/2022 194  ms Final    QRS Duration 04/08/2022 120  ms Final    Q-T Interval 04/08/2022 404  ms Final    QTC Calculation (Bezet) 04/08/2022 474  ms Final    Calculated P Axis 04/08/2022 46  degrees Final    Calculated R Axis 04/08/2022 -2  degrees Final    Calculated T Axis 04/08/2022 -162  degrees Final    Diagnosis 04/08/2022    Final                    Value:Normal sinus rhythm  Anterior infarct , age undetermined  T wave abnormality, consider inferolateral ischemia  Abnormal ECG  When compared with ECG of 08-APR-2022 04:23,  No significant change was found  Confirmed by Telma Nguyen M.D., Laurie Falcon ([id number]) on 4/9/2022 9:20:45 AM      Glucose (POC) 04/08/2022 184* 65 - 117 mg/dL Final    Comment: The Accu-Chek Inform II glucometer is not FDA cleared for critically ill patient use. A study was performed validating the equivalence of glucometer and clinical laboratory results on this patient population. Despite the study, use of glucometers with capillary specimens from critically ill patients, regardless of their location, makes the test high complexity and requires the performing individual to comply with CLIA requirements more stringent than those for waived testing in the hospital setting. Critical thinking skills are necessary to determine a potentially critically ill patients status prior to using a glucometer.  Performed by 04/08/2022 Stacey Lu   Final    aPTT 04/08/2022 25.4  21.2 - 34.1 sec Final    PLEASE NOTE NEW REFERENCE RANGE Investigated per delta check protocol    aPTT, therapeutic range 04/08/2022    82 - 109 sec Final    Glucose (POC) 04/08/2022 273* 65 - 117 mg/dL Final    Comment: The Accu-Chek Inform II glucometer is not FDA cleared for critically ill patient use. A study was performed validating the equivalence of glucometer and clinical laboratory results on this patient population. Despite the study, use of glucometers with capillary specimens from critically ill patients, regardless of their location, makes the test high complexity and requires the performing individual to comply with CLIA requirements more stringent than those for waived testing in the hospital setting. Critical thinking skills are necessary to determine a potentially critically ill patients status prior to using a glucometer.       Performed by 04/08/2022 Raul Quiroz   Final    aPTT 04/09/2022 28.8  21.2 - 34.1 sec Final    PLEASE NOTE NEW REFERENCE RANGE    aPTT, therapeutic range 04/09/2022    82 - 109 sec Final    Sodium 04/09/2022 140  136 - 145 mmol/L Final    Potassium 04/09/2022 3.5  3.5 - 5.1 mmol/L Final    Chloride 04/09/2022 105  97 - 108 mmol/L Final    CO2 04/09/2022 27  21 - 32 mmol/L Final    Anion gap 04/09/2022 8  5 - 15 mmol/L Final    Glucose 04/09/2022 191* 65 - 100 mg/dL Final    BUN 04/09/2022 51* 6 - 20 mg/dL Final    Creatinine 04/09/2022 3.62* 0.70 - 1.30 mg/dL Final    BUN/Creatinine ratio 04/09/2022 14  12 - 20   Final    GFR est AA 04/09/2022 22* >60 ml/min/1.73m2 Final    GFR est non-AA 04/09/2022 18* >60 ml/min/1.73m2 Final    Comment: Estimated GFR is calculated using the IDMS-traceable Modification of Diet in Renal Disease (MDRD) Study equation, reported for both  Americans (GFRAA) and non- Americans (GFRNA), and normalized to 1.73m2 body surface area. The physician must decide which value applies to the patient. The MDRD study equation should only be used in individuals age 25 or older. It has not been validated for the following: pregnant women, patients with serious comorbid conditions, or on certain medications, or persons with extremes of body size, muscle mass, or nutritional status.       Calcium 04/09/2022 8.6  8.5 - 10.1 mg/dL Final    WBC 04/09/2022 7.3  4.1 - 11.1 K/uL Final    RBC 04/09/2022 4.14  4.10 - 5.70 M/uL Final    HGB 04/09/2022 9.9* 12.1 - 17.0 g/dL Final    HCT 04/09/2022 32.6* 36.6 - 50.3 % Final    MCV 04/09/2022 78.7* 80.0 - 99.0 FL Final    MCH 04/09/2022 23.9* 26.0 - 34.0 PG Final    MCHC 04/09/2022 30.4  30.0 - 36.5 g/dL Final    RDW 04/09/2022 13.5  11.5 - 14.5 % Final    PLATELET 61/23/4700 674  150 - 400 K/uL Final    MPV 04/09/2022 11.3  8.9 - 12.9 FL Final    NRBC 04/09/2022 0.0  0.0  WBC Final    ABSOLUTE NRBC 04/09/2022 0.00  0.00 - 0.01 K/uL Final    NEUTROPHILS 04/09/2022 76* 32 - 75 % Final    LYMPHOCYTES 04/09/2022 12  12 - 49 % Final    MONOCYTES 04/09/2022 10  5 - 13 % Final    EOSINOPHILS 04/09/2022 2  0 - 7 % Final    BASOPHILS 04/09/2022 0  0 - 1 % Final    IMMATURE GRANULOCYTES 04/09/2022 0  0 - 0.5 % Final    ABS. NEUTROPHILS 04/09/2022 5.5  1.8 - 8.0 K/UL Final    ABS. LYMPHOCYTES 04/09/2022 0.9  0.8 - 3.5 K/UL Final    ABS. MONOCYTES 04/09/2022 0.8  0.0 - 1.0 K/UL Final    ABS. EOSINOPHILS 04/09/2022 0.1  0.0 - 0.4 K/UL Final    ABS. BASOPHILS 04/09/2022 0.0  0.0 - 0.1 K/UL Final    ABS. IMM. GRANS. 04/09/2022 0.0  0.00 - 0.04 K/UL Final    DF 04/09/2022 AUTOMATED    Final    Glucose (POC) 04/09/2022 176* 65 - 117 mg/dL Final    Comment: The Accu-Chek Inform II glucometer is not FDA cleared for critically ill patient use. A study was performed validating the equivalence of glucometer and clinical laboratory results on this patient population. Despite the study, use of glucometers with capillary specimens from critically ill patients, regardless of their location, makes the test high complexity and requires the performing individual to comply with CLIA requirements more stringent than those for waived testing in the hospital setting. Critical thinking skills are necessary to determine a potentially critically ill patients status prior to using a glucometer.       Performed by 04/09/2022 Mike Zurita   Final   Admission on 04/08/2022, Discharged on 04/08/2022   Component Date Value Ref Range Status    Ventricular Rate 04/08/2022 81  BPM Final    Atrial Rate 04/08/2022 80  BPM Final    P-R Interval 04/08/2022 197  ms Final    QRS Duration 04/08/2022 126  ms Final    Q-T Interval 04/08/2022 387  ms Final    QTC Calculation (Bezet) 04/08/2022 450  ms Final    Calculated P Axis 04/08/2022 36  degrees Final    Calculated R Axis 04/08/2022 34  degrees Final    Calculated T Axis 04/08/2022 -140  degrees Final    Diagnosis 04/08/2022    Final                    Value:Sinus rhythm  Probable left atrial enlargement  Nonspecific intraventricular conduction delay  Nonspecific T abnormalities, lateral leads  Baseline wander in lead(s) V2    Confirmed by Fransisco Hawthorne M.D., Roni Mack ([id number]) on 4/8/2022 8:32:49 AM      WBC 04/08/2022 8.7  4.4 - 11.3 K/uL Final    Investigated per delta check protocol    RBC 04/08/2022 4.61  4.50 - 5.90 M/uL Final    HGB 04/08/2022 10.9* 13.5 - 17.5 g/dL Final    Investigated per delta check protocol    HCT 04/08/2022 35.1* 41 - 53 % Final    Investigated per delta check protocol    MCV 04/08/2022 76.1* 80 - 100 FL Final    Investigated per delta check protocol    MCH 04/08/2022 23.5* 31 - 34 PG Final    Investigated per delta check protocol    MCHC 04/08/2022 30.9* 31.0 - 36.0 g/dL Final    RDW 04/08/2022 14.3  11.5 - 14.5 % Final    PLATELET 74/87/9699 100  150 - 400 K/uL Final    MPV 04/08/2022 9.3  6.5 - 11.5 FL Final    NRBC 04/08/2022 0.0   WBC Final    ABSOLUTE NRBC 04/08/2022 0.00  K/uL Final    NEUTROPHILS 04/08/2022 82* 42 - 75 % Final    LYMPHOCYTES 04/08/2022 8* 20.5 - 51.1 % Final    MONOCYTES 04/08/2022 8  1.7 - 9.3 % Final    EOSINOPHILS 04/08/2022 1  0.9 - 2.9 % Final    BASOPHILS 04/08/2022 1  0.0 - 2.5 % Final    ABS. NEUTROPHILS 04/08/2022 7.2  1.8 - 7.7 K/UL Final    ABS. LYMPHOCYTES 04/08/2022 0.7* 1.0 - 4.8 K/UL Final    ABS. MONOCYTES 04/08/2022 0.7  0.2 - 2.4 K/UL Final    ABS. EOSINOPHILS 04/08/2022 0.1  0.0 - 0.7 K/UL Final    ABS.  BASOPHILS 04/08/2022 0.1  0.0 - 0.2 K/UL Final    Sodium 04/08/2022 137  136 - 145 mmol/L Final    Potassium 04/08/2022 4.0  3.5 - 5.1 mmol/L Final    Chloride 04/08/2022 104  97 - 108 mmol/L Final    CO2 04/08/2022 25  21 - 32 mmol/L Final    Anion gap 04/08/2022 8  5 - 15 mmol/L Final    Glucose 04/08/2022 294* 65 - 100 mg/dL Final    BUN 04/08/2022 53* 6 - 20 mg/dL Final    Creatinine 04/08/2022 3.71* 0.70 - 1.30 mg/dL Final    BUN/Creatinine ratio 04/08/2022 14  12 - 20   Final    GFR est AA 04/08/2022 21* >60 ml/min/1.73m2 Final    GFR est non-AA 04/08/2022 17* >60 ml/min/1.73m2 Final    Comment: Estimated GFR is calculated using the IDMS-traceable Modification of Diet in Renal Disease (MDRD) Study equation, reported for both  Americans (GFRAA) and non- Americans (GFRNA), and normalized to 1.73m2 body surface area. The physician must decide which value applies to the patient. The MDRD study equation should only be used in individuals age 25 or older. It has not been validated for the following: pregnant women, patients with serious comorbid conditions, or on certain medications, or persons with extremes of body size, muscle mass, or nutritional status.  Calcium 04/08/2022 7.9* 8.5 - 10.1 mg/dL Final    Bilirubin, total 04/08/2022 0.7  0.2 - 1.0 mg/dL Final    AST (SGOT) 04/08/2022 18  15 - 37 U/L Final    ALT (SGPT) 04/08/2022 27  12 - 78 U/L Final    Alk. phosphatase 04/08/2022 65  45 - 117 U/L Final    Protein, total 04/08/2022 6.3* 6.4 - 8.2 g/dL Final    Albumin 04/08/2022 2.6* 3.5 - 5.0 g/dL Final    Globulin 04/08/2022 3.7  2.0 - 4.0 g/dL Final    A-G Ratio 04/08/2022 0.7* 1.1 - 2.2   Final    Troponin-High Sensitivity 04/08/2022 110* 0 - 76 ng/L Final    Up to 50% of normal patients may have low levels of detectable troponin (within reference range) circulating in the blood. Mild elevations in troponin that are above the reference range, may be present with other cardiac and non-cardiac disease states. Two or three serial tests at two hour intervals, are recommended to evaluate changes in circulating troponin over time. CALLED TO AND READ BACK BY SALINA PA AT 0551 BY [facility]    Troponin-High Sensitivity 04/08/2022 118* 0 - 76 ng/L Final    Up to 50% of normal patients may have low levels of detectable troponin (within reference range) circulating in the blood. Mild elevations in troponin that are above the reference range, may be present with other cardiac and non-cardiac disease states.  Two or three serial tests at two hour intervals, are recommended to evaluate changes in circulating troponin over time. CALLED TO AND READ BACK BY HOLLIS AT 710AM BY [facility]    aPTT 04/08/2022 29.4  21.2 - 34.1 sec Final    Investigated per delta check protocol    aPTT, therapeutic range 04/08/2022    82 - 109 sec Final   Office Visit on 03/21/2022   Component Date Value Ref Range Status    Hemoglobin A1c (POC) 03/21/2022 14.0  % Final     Recent Labs     04/09/22  0258 04/08/22  0445    137   K 3.5 4.0    104   CO2 27 25   * 294*   BUN 51* 53*   CREA 3.62* 3.71*   CA 8.6 7.9*   ALB  --  2.6*   ALT  --  27     No components found for: GLPOC  No results for input(s): PH, PCO2, PO2, HCO3, FIO2 in the last 72 hours. Admission on 04/08/2022   Component Date Value Ref Range Status    Troponin-High Sensitivity 04/08/2022 171* 0 - 76 ng/L Final    CALLED TO AND READ BACK BY JEFFREY ALVARADO RN ON 4/8/2022 @0955/[facility]    aPTT 04/08/2022 58.4* 21.2 - 34.1 sec Final    PLEASE NOTE NEW REFERENCE RANGE    aPTT, therapeutic range 04/08/2022    82 - 109 sec Final    WBC 04/08/2022 8.6  4.1 - 11.1 K/uL Final    RBC 04/08/2022 4.91  4.10 - 5.70 M/uL Final    HGB 04/08/2022 12.0* 12.1 - 17.0 g/dL Final    HCT 04/08/2022 38.8  36.6 - 50.3 % Final    MCV 04/08/2022 79.0* 80.0 - 99.0 FL Final    MCH 04/08/2022 24.4* 26.0 - 34.0 PG Final    MCHC 04/08/2022 30.9  30.0 - 36.5 g/dL Final    RDW 04/08/2022 13.7  11.5 - 14.5 % Final    PLATELET 92/21/2550 258  150 - 400 K/uL Final    MPV 04/08/2022 12.5  8.9 - 12.9 FL Final    NRBC 04/08/2022 0.0  0.0  WBC Final    ABSOLUTE NRBC 04/08/2022 0.00  0.00 - 0.01 K/uL Final    NEUTROPHILS 04/08/2022 80* 32 - 75 % Final    LYMPHOCYTES 04/08/2022 11* 12 - 49 % Final    MONOCYTES 04/08/2022 8  5 - 13 % Final    EOSINOPHILS 04/08/2022 1  0 - 7 % Final    BASOPHILS 04/08/2022 0  0 - 1 % Final    IMMATURE GRANULOCYTES 04/08/2022 0  0 - 0.5 % Final    ABS.  NEUTROPHILS 04/08/2022 6.9  1.8 - 8.0 K/UL Final    ABS. LYMPHOCYTES 04/08/2022 1.0  0.8 - 3.5 K/UL Final    ABS. MONOCYTES 04/08/2022 0.7  0.0 - 1.0 K/UL Final    ABS. EOSINOPHILS 04/08/2022 0.1  0.0 - 0.4 K/UL Final    ABS. BASOPHILS 04/08/2022 0.0  0.0 - 0.1 K/UL Final    ABS. IMM. GRANS. 04/08/2022 0.0  0.00 - 0.04 K/UL Final    DF 04/08/2022 AUTOMATED    Final    aPTT 04/08/2022 41.8* 21.2 - 34.1 sec Final    PLEASE NOTE NEW REFERENCE RANGE Investigated per delta check protocol    aPTT, therapeutic range 04/08/2022    sec Final    Troponin-High Sensitivity 04/08/2022 406* 0 - 76 ng/L Final    Investigated per delta check protocol CALLED TO AND READ BACK BY JEFFREY ALVARADO RN ON 4/8/2022 @1422/[facility]    Glucose (POC) 04/08/2022 194* 65 - 117 mg/dL Final    Comment: The Accu-Chek Inform II glucometer is not FDA cleared for critically ill patient use. A study was performed validating the equivalence of glucometer and clinical laboratory results on this patient population. Despite the study, use of glucometers with capillary specimens from critically ill patients, regardless of their location, makes the test high complexity and requires the performing individual to comply with CLIA requirements more stringent than those for waived testing in the hospital setting. Critical thinking skills are necessary to determine a potentially critically ill patients status prior to using a glucometer.       Performed by 04/08/2022 EV MANJARREZ   Final    Ventricular Rate 04/08/2022 83  BPM Final    Atrial Rate 04/08/2022 83  BPM Final    P-R Interval 04/08/2022 194  ms Final    QRS Duration 04/08/2022 120  ms Final    Q-T Interval 04/08/2022 404  ms Final    QTC Calculation (Bezet) 04/08/2022 474  ms Final    Calculated P Axis 04/08/2022 46  degrees Final    Calculated R Axis 04/08/2022 -2  degrees Final    Calculated T Axis 04/08/2022 -162  degrees Final    Diagnosis 04/08/2022    Final                    Value:Normal sinus rhythm  Anterior infarct , age undetermined  T wave abnormality, consider inferolateral ischemia  Abnormal ECG  When compared with ECG of 08-APR-2022 04:23,  No significant change was found  Confirmed by Miguel Angel Thomas M.D., Ossie Norrie ([id number]) on 4/9/2022 9:20:45 AM      Glucose (POC) 04/08/2022 184* 65 - 117 mg/dL Final    Comment: The Accu-Chek Inform II glucometer is not FDA cleared for critically ill patient use. A study was performed validating the equivalence of glucometer and clinical laboratory results on this patient population. Despite the study, use of glucometers with capillary specimens from critically ill patients, regardless of their location, makes the test high complexity and requires the performing individual to comply with CLIA requirements more stringent than those for waived testing in the hospital setting. Critical thinking skills are necessary to determine a potentially critically ill patients status prior to using a glucometer.  Performed by 04/08/2022 Ghislaine Hammonds   Final    aPTT 04/08/2022 25.4  21.2 - 34.1 sec Final    PLEASE NOTE NEW REFERENCE RANGE Investigated per delta check protocol    aPTT, therapeutic range 04/08/2022    82 - 109 sec Final    Glucose (POC) 04/08/2022 273* 65 - 117 mg/dL Final    Comment: The Accu-Chek Inform II glucometer is not FDA cleared for critically ill patient use. A study was performed validating the equivalence of glucometer and clinical laboratory results on this patient population. Despite the study, use of glucometers with capillary specimens from critically ill patients, regardless of their location, makes the test high complexity and requires the performing individual to comply with CLIA requirements more stringent than those for waived testing in the hospital setting. Critical thinking skills are necessary to determine a potentially critically ill patients status prior to using a glucometer.       Performed by 04/08/2022 Nick Dsouza    aPTT 04/09/2022 28.8  21.2 - 34.1 sec Final    PLEASE NOTE NEW REFERENCE RANGE    aPTT, therapeutic range 04/09/2022    82 - 109 sec Final    Sodium 04/09/2022 140  136 - 145 mmol/L Final    Potassium 04/09/2022 3.5  3.5 - 5.1 mmol/L Final    Chloride 04/09/2022 105  97 - 108 mmol/L Final    CO2 04/09/2022 27  21 - 32 mmol/L Final    Anion gap 04/09/2022 8  5 - 15 mmol/L Final    Glucose 04/09/2022 191* 65 - 100 mg/dL Final    BUN 04/09/2022 51* 6 - 20 mg/dL Final    Creatinine 04/09/2022 3.62* 0.70 - 1.30 mg/dL Final    BUN/Creatinine ratio 04/09/2022 14  12 - 20   Final    GFR est AA 04/09/2022 22* >60 ml/min/1.73m2 Final    GFR est non-AA 04/09/2022 18* >60 ml/min/1.73m2 Final    Comment: Estimated GFR is calculated using the IDMS-traceable Modification of Diet in Renal Disease (MDRD) Study equation, reported for both  Americans (GFRAA) and non- Americans (GFRNA), and normalized to 1.73m2 body surface area. The physician must decide which value applies to the patient. The MDRD study equation should only be used in individuals age 25 or older. It has not been validated for the following: pregnant women, patients with serious comorbid conditions, or on certain medications, or persons with extremes of body size, muscle mass, or nutritional status.       Calcium 04/09/2022 8.6  8.5 - 10.1 mg/dL Final    WBC 04/09/2022 7.3  4.1 - 11.1 K/uL Final    RBC 04/09/2022 4.14  4.10 - 5.70 M/uL Final    HGB 04/09/2022 9.9* 12.1 - 17.0 g/dL Final    HCT 04/09/2022 32.6* 36.6 - 50.3 % Final    MCV 04/09/2022 78.7* 80.0 - 99.0 FL Final    MCH 04/09/2022 23.9* 26.0 - 34.0 PG Final    MCHC 04/09/2022 30.4  30.0 - 36.5 g/dL Final    RDW 04/09/2022 13.5  11.5 - 14.5 % Final    PLATELET 39/64/4298 839  150 - 400 K/uL Final    MPV 04/09/2022 11.3  8.9 - 12.9 FL Final    NRBC 04/09/2022 0.0  0.0  WBC Final    ABSOLUTE NRBC 04/09/2022 0.00  0.00 - 0.01 K/uL Final    NEUTROPHILS 04/09/2022 76* 32 - 75 % Final    LYMPHOCYTES 04/09/2022 12  12 - 49 % Final    MONOCYTES 04/09/2022 10  5 - 13 % Final    EOSINOPHILS 04/09/2022 2  0 - 7 % Final    BASOPHILS 04/09/2022 0  0 - 1 % Final    IMMATURE GRANULOCYTES 04/09/2022 0  0 - 0.5 % Final    ABS. NEUTROPHILS 04/09/2022 5.5  1.8 - 8.0 K/UL Final    ABS. LYMPHOCYTES 04/09/2022 0.9  0.8 - 3.5 K/UL Final    ABS. MONOCYTES 04/09/2022 0.8  0.0 - 1.0 K/UL Final    ABS. EOSINOPHILS 04/09/2022 0.1  0.0 - 0.4 K/UL Final    ABS. BASOPHILS 04/09/2022 0.0  0.0 - 0.1 K/UL Final    ABS. IMM. GRANS. 04/09/2022 0.0  0.00 - 0.04 K/UL Final    DF 04/09/2022 AUTOMATED    Final    Glucose (POC) 04/09/2022 176* 65 - 117 mg/dL Final    Comment: The Accu-Chek Inform II glucometer is not FDA cleared for critically ill patient use. A study was performed validating the equivalence of glucometer and clinical laboratory results on this patient population. Despite the study, use of glucometers with capillary specimens from critically ill patients, regardless of their location, makes the test high complexity and requires the performing individual to comply with CLIA requirements more stringent than those for waived testing in the hospital setting. Critical thinking skills are necessary to determine a potentially critically ill patients status prior to using a glucometer.       Performed by 04/09/2022 Cezar Solomon   Final   Admission on 04/08/2022, Discharged on 04/08/2022   Component Date Value Ref Range Status    Ventricular Rate 04/08/2022 81  BPM Final    Atrial Rate 04/08/2022 80  BPM Final    P-R Interval 04/08/2022 197  ms Final    QRS Duration 04/08/2022 126  ms Final    Q-T Interval 04/08/2022 387  ms Final    QTC Calculation (Bezet) 04/08/2022 450  ms Final    Calculated P Axis 04/08/2022 36  degrees Final    Calculated R Axis 04/08/2022 34  degrees Final    Calculated T Axis 04/08/2022 -140  degrees Final    Diagnosis 04/08/2022    Final Value: Sinus rhythm  Probable left atrial enlargement  Nonspecific intraventricular conduction delay  Nonspecific T abnormalities, lateral leads  Baseline wander in lead(s) V2    Confirmed by Rosio Parmar M.D., [place] Lars ([id number]) on 4/8/2022 8:32:49 AM      WBC 04/08/2022 8.7  4.4 - 11.3 K/uL Final    Investigated per delta check protocol    RBC 04/08/2022 4.61  4.50 - 5.90 M/uL Final    HGB 04/08/2022 10.9* 13.5 - 17.5 g/dL Final    Investigated per delta check protocol    HCT 04/08/2022 35.1* 41 - 53 % Final    Investigated per delta check protocol    MCV 04/08/2022 76.1* 80 - 100 FL Final    Investigated per delta check protocol    MCH 04/08/2022 23.5* 31 - 34 PG Final    Investigated per delta check protocol    MCHC 04/08/2022 30.9* 31.0 - 36.0 g/dL Final    RDW 04/08/2022 14.3  11.5 - 14.5 % Final    PLATELET 30/36/2605 005  150 - 400 K/uL Final    MPV 04/08/2022 9.3  6.5 - 11.5 FL Final    NRBC 04/08/2022 0.0   WBC Final    ABSOLUTE NRBC 04/08/2022 0.00  K/uL Final    NEUTROPHILS 04/08/2022 82* 42 - 75 % Final    LYMPHOCYTES 04/08/2022 8* 20.5 - 51.1 % Final    MONOCYTES 04/08/2022 8  1.7 - 9.3 % Final    EOSINOPHILS 04/08/2022 1  0.9 - 2.9 % Final    BASOPHILS 04/08/2022 1  0.0 - 2.5 % Final    ABS. NEUTROPHILS 04/08/2022 7.2  1.8 - 7.7 K/UL Final    ABS. LYMPHOCYTES 04/08/2022 0.7* 1.0 - 4.8 K/UL Final    ABS. MONOCYTES 04/08/2022 0.7  0.2 - 2.4 K/UL Final    ABS. EOSINOPHILS 04/08/2022 0.1  0.0 - 0.7 K/UL Final    ABS.  BASOPHILS 04/08/2022 0.1  0.0 - 0.2 K/UL Final    Sodium 04/08/2022 137  136 - 145 mmol/L Final    Potassium 04/08/2022 4.0  3.5 - 5.1 mmol/L Final    Chloride 04/08/2022 104  97 - 108 mmol/L Final    CO2 04/08/2022 25  21 - 32 mmol/L Final    Anion gap 04/08/2022 8  5 - 15 mmol/L Final    Glucose 04/08/2022 294* 65 - 100 mg/dL Final    BUN 04/08/2022 53* 6 - 20 mg/dL Final    Creatinine 04/08/2022 3.71* 0.70 - 1.30 mg/dL Final    BUN/Creatinine ratio 04/08/2022 14  12 - 20   Final    GFR est AA 04/08/2022 21* >60 ml/min/1.73m2 Final    GFR est non-AA 04/08/2022 17* >60 ml/min/1.73m2 Final    Comment: Estimated GFR is calculated using the IDMS-traceable Modification of Diet in Renal Disease (MDRD) Study equation, reported for both  Americans (GFRAA) and non- Americans (GFRNA), and normalized to 1.73m2 body surface area. The physician must decide which value applies to the patient. The MDRD study equation should only be used in individuals age 25 or older. It has not been validated for the following: pregnant women, patients with serious comorbid conditions, or on certain medications, or persons with extremes of body size, muscle mass, or nutritional status.  Calcium 04/08/2022 7.9* 8.5 - 10.1 mg/dL Final    Bilirubin, total 04/08/2022 0.7  0.2 - 1.0 mg/dL Final    AST (SGOT) 04/08/2022 18  15 - 37 U/L Final    ALT (SGPT) 04/08/2022 27  12 - 78 U/L Final    Alk. phosphatase 04/08/2022 65  45 - 117 U/L Final    Protein, total 04/08/2022 6.3* 6.4 - 8.2 g/dL Final    Albumin 04/08/2022 2.6* 3.5 - 5.0 g/dL Final    Globulin 04/08/2022 3.7  2.0 - 4.0 g/dL Final    A-G Ratio 04/08/2022 0.7* 1.1 - 2.2   Final    Troponin-High Sensitivity 04/08/2022 110* 0 - 76 ng/L Final    Up to 50% of normal patients may have low levels of detectable troponin (within reference range) circulating in the blood. Mild elevations in troponin that are above the reference range, may be present with other cardiac and non-cardiac disease states. Two or three serial tests at two hour intervals, are recommended to evaluate changes in circulating troponin over time. CALLED TO AND READ BACK BY SALINA PA AT [id number] BY [facility]    Troponin-High Sensitivity 04/08/2022 118* 0 - 76 ng/L Final    Up to 50% of normal patients may have low levels of detectable troponin (within reference range) circulating in the blood.  Mild elevations in troponin that are above the reference range, may be present with other cardiac and non-cardiac disease states. Two or three serial tests at two hour intervals, are recommended to evaluate changes in circulating troponin over time. 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 P is not at goal <130/80   3# Anemia due to CKD   4# CAD -s/p cardiac cath -RPDA 95% stenosis-s/p PCI - doing well   5# D. M - need better control with Hgb A1c 14%. 6# SHPTH -need phos and PTH levels       Active Problems:    Chest pain (4/8/2022)      I came back to floor to review labs ,staging of CKD and risk factors for progression of CKD with pt and his wife. Plan: Will see pt in 1 week  post discharge   Repeat labs and start him on appropriate meds   KCL 20 MEQ - NOW   NEED VIT-D and calcitriol   Pt is instructed to take vit-d 2,000 units daily   FERROUS SULFATE 325 MG daily. Will get labs in office and treat him   Pt is stable for discharge renal point of view. Thank you for consult.

## 2022-04-09 NOTE — PROGRESS NOTES
Problem: Diabetes Self-Management  Goal: *Disease process and treatment process  Description: Define diabetes and identify own type of diabetes; list 3 options for treating diabetes. Outcome: Progressing Towards Goal  Goal: *Incorporating nutritional management into lifestyle  Description: Describe effect of type, amount and timing of food on blood glucose; list 3 methods for planning meals. Outcome: Progressing Towards Goal  Goal: *Incorporating physical activity into lifestyle  Description: State effect of exercise on blood glucose levels. Outcome: Progressing Towards Goal  Goal: *Developing strategies to promote health/change behavior  Description: Define the ABC's of diabetes; identify appropriate screenings, schedule and personal plan for screenings. Outcome: Progressing Towards Goal  Goal: *Using medications safely  Description: State effect of diabetes medications on diabetes; name diabetes medication taking, action and side effects. Outcome: Progressing Towards Goal  Goal: *Monitoring blood glucose, interpreting and using results  Description: Identify recommended blood glucose targets  and personal targets. Outcome: Progressing Towards Goal  Goal: *Prevention, detection, treatment of acute complications  Description: List symptoms of hyper- and hypoglycemia; describe how to treat low blood sugar and actions for lowering  high blood glucose level. Outcome: Progressing Towards Goal  Goal: *Prevention, detection and treatment of chronic complications  Description: Define the natural course of diabetes and describe the relationship of blood glucose levels to long term complications of diabetes.   Outcome: Progressing Towards Goal  Goal: *Developing strategies to address psychosocial issues  Description: Describe feelings about living with diabetes; identify support needed and support network  Outcome: Progressing Towards Goal  Goal: *Sick day guidelines  Outcome: Progressing Towards Goal  Goal: *Patient Specific Goal (EDIT GOAL, INSERT TEXT)  Outcome: Progressing Towards Goal

## 2022-04-10 LAB
EST. AVERAGE GLUCOSE BLD GHB EST-MCNC: 349 MG/DL
HBA1C MFR BLD: 13.8 % (ref 4–5.6)

## 2022-04-11 PROBLEM — R07.9 CHEST PAIN: Status: ACTIVE | Noted: 2022-04-08

## 2022-04-11 LAB
ACT BLD: 675 SEC (ref 74–125)
PERFORMED BY, TECHID: ABNORMAL

## 2022-04-11 NOTE — PROGRESS NOTES
Patient was discharged prior to consult. Patient was mailed printed teaching materials explaining the importance of medication compliance. Patient was mailed my contact information and encouraged to contact me if he has any questions or needs further assistance. Patient was discharged prior to cardiac rehab consult. Patient was mailed information regarding enrollment into phase 2 outpatient cardiac rehab at [facility], which is the facility that is nearest to where the patient lives. Patient's information was forwarded to this facility. Patient will be contacted by this facility to schedule an initial appointment. Patient was mailed printed teaching materials, my contact information in case he needs further assistance, and the address and phone number for the cardiac rehab facility to which he has been referred.

## 2022-04-12 ENCOUNTER — TELEPHONE (OUTPATIENT)
Dept: PRIMARY CARE CLINIC | Age: 52
End: 2022-04-12

## 2022-04-12 LAB
ATRIAL RATE: 75 BPM
CALCULATED P AXIS, ECG09: 31 DEGREES
CALCULATED R AXIS, ECG10: 18 DEGREES
CALCULATED T AXIS, ECG11: -125 DEGREES
DIAGNOSIS, 93000: NORMAL
P-R INTERVAL, ECG05: 196 MS
Q-T INTERVAL, ECG07: 432 MS
QRS DURATION, ECG06: 122 MS
QTC CALCULATION (BEZET), ECG08: 482 MS
VENTRICULAR RATE, ECG03: 75 BPM

## 2022-04-12 NOTE — TELEPHONE ENCOUNTER
Care Transitions Initial Follow Up Call    Outreach made within 2 business days of discharge: Yes    Patient: [place] Setting Patient : 1970   MRN: [id number]  Reason for Admission: Chest Pain per Patient. Discharge Date: 22       Spoke with:Patient    Discharge department/facility: [facility]    TCM Interactive Patient Contact:  Was patient able to fill all prescriptions: Yes  Was patient instructed to bring all medications to the follow-up visit: Yes  Is patient taking all medications as directed in the discharge summary?  Yes  Does patient understand their discharge instructions: Yes  Does patient have questions or concerns that need addressed prior to 7-14 day follow up office visit: No    Scheduled appointment with PCP within 7-14 days    Follow Up  Future Appointments   Date Time Provider Marquise Mtz   2022  9:00 AM Angelica Nunez NP [facility] BS JOSUE Corral LPN

## 2022-04-20 DIAGNOSIS — E11.65 TYPE 2 DIABETES MELLITUS WITH HYPERGLYCEMIA, WITHOUT LONG-TERM CURRENT USE OF INSULIN (HCC): Primary | ICD-10-CM

## 2022-04-20 RX ORDER — FLASH GLUCOSE SCANNING READER
EACH MISCELLANEOUS
Qty: 1 EACH | Refills: 0 | Status: SHIPPED | OUTPATIENT
Start: 2022-04-20 | End: 2022-05-04 | Stop reason: ALTCHOICE

## 2022-04-20 RX ORDER — FLASH GLUCOSE SENSOR
KIT MISCELLANEOUS
Qty: 3 KIT | Refills: 11 | Status: SHIPPED | OUTPATIENT
Start: 2022-04-20 | End: 2022-05-04 | Stop reason: ALTCHOICE

## 2022-05-03 ENCOUNTER — HOSPITAL ENCOUNTER (OUTPATIENT)
Dept: CARDIAC REHAB | Age: 52
Discharge: HOME | End: 2022-05-03
Payer: COMMERCIAL

## 2022-05-03 VITALS — HEIGHT: 75 IN | WEIGHT: 243.6 LBS | BODY MASS INDEX: 30.29 KG/M2

## 2022-05-03 PROCEDURE — 93798 PHYS/QHP OP CAR RHAB W/ECG: CPT

## 2022-05-03 PROCEDURE — 93797 PHYS/QHP OP CAR RHAB WO ECG: CPT

## 2022-05-03 NOTE — CARDIO/PULMONARY
Jasvir Smith   50 y.o.    presented to cardiac rehab for orientation and exercise tolerance test today with a primary diagnosis of PCI . Jasvir Smith has a history of cardiac stents x4, HF, CD, ischemic myocardial dysfunction, HTN, obesity, prostatitis, spinal stenosis, BPH, hernia, hyperlipidemia, COVID, MI, DM. Cardiac risk factors include previous MI, previous stent, HTN, hyperlipidemia, DM. Jasvir Smith is   and lives with wife Sandra [facility]. PHQ9, depression score, is 1 and this is considered normal. The result was discussed with patient who affirms score to be accurate. Patient denied chest pain or SOB during 6 minute walk test and was in SR w/without ectopy. Exercise prescription developed around patient stated goals, to be supplemented with home exercise recommendations. EF is 10-15. Education manual given to patient and reviewed.  Patient will attend cardiac rehab 2-3 times/week and education

## 2022-05-04 ENCOUNTER — TELEPHONE (OUTPATIENT)
Dept: PRIMARY CARE CLINIC | Age: 52
End: 2022-05-04

## 2022-05-04 ENCOUNTER — PATIENT MESSAGE (OUTPATIENT)
Dept: PRIMARY CARE CLINIC | Age: 52
End: 2022-05-04

## 2022-05-04 DIAGNOSIS — E11.65 TYPE 2 DIABETES MELLITUS WITH HYPERGLYCEMIA, WITH LONG-TERM CURRENT USE OF INSULIN (HCC): Primary | ICD-10-CM

## 2022-05-04 DIAGNOSIS — I10 ESSENTIAL HYPERTENSION: Primary | ICD-10-CM

## 2022-05-04 DIAGNOSIS — Z79.4 TYPE 2 DIABETES MELLITUS WITH HYPERGLYCEMIA, WITH LONG-TERM CURRENT USE OF INSULIN (HCC): Primary | ICD-10-CM

## 2022-05-04 RX ORDER — HYDRALAZINE HYDROCHLORIDE 10 MG/1
20 TABLET, FILM COATED ORAL 3 TIMES DAILY
Qty: 540 TABLET | Refills: 0 | Status: SHIPPED | OUTPATIENT
Start: 2022-05-04 | End: 2022-08-01

## 2022-05-04 RX ORDER — FLASH GLUCOSE SENSOR
KIT MISCELLANEOUS
Qty: 1 KIT | Refills: 3 | Status: SHIPPED | OUTPATIENT
Start: 2022-05-04

## 2022-05-04 RX ORDER — FLASH GLUCOSE SCANNING READER
EACH MISCELLANEOUS
Qty: 1 EACH | Refills: 0 | Status: SHIPPED | OUTPATIENT
Start: 2022-05-04

## 2022-05-04 NOTE — TELEPHONE ENCOUNTER
[facility] left message. The free style 14 day reader is no longer available. Is it okaye to change to [facility]? If so, please send new order for reader and sensor.

## 2022-05-05 ENCOUNTER — HOSPITAL ENCOUNTER (OUTPATIENT)
Dept: CARDIAC REHAB | Age: 52
Discharge: HOME | End: 2022-05-05
Payer: COMMERCIAL

## 2022-05-05 VITALS — WEIGHT: 244 LBS | BODY MASS INDEX: 30.5 KG/M2

## 2022-05-05 PROCEDURE — 93798 PHYS/QHP OP CAR RHAB W/ECG: CPT

## 2022-05-09 ENCOUNTER — HOSPITAL ENCOUNTER (OUTPATIENT)
Dept: CARDIAC REHAB | Age: 52
Discharge: HOME | End: 2022-05-09
Payer: COMMERCIAL

## 2022-05-09 VITALS — BODY MASS INDEX: 30.5 KG/M2 | WEIGHT: 244 LBS

## 2022-05-09 PROCEDURE — 93798 PHYS/QHP OP CAR RHAB W/ECG: CPT

## 2022-05-12 ENCOUNTER — HOSPITAL ENCOUNTER (OUTPATIENT)
Dept: CARDIAC REHAB | Age: 52
Discharge: HOME | End: 2022-05-12
Payer: COMMERCIAL

## 2022-05-12 VITALS — WEIGHT: 245 LBS | BODY MASS INDEX: 30.62 KG/M2

## 2022-05-12 PROCEDURE — 93798 PHYS/QHP OP CAR RHAB W/ECG: CPT

## 2022-05-16 ENCOUNTER — HOSPITAL ENCOUNTER (OUTPATIENT)
Dept: CARDIAC REHAB | Age: 52
Discharge: HOME | End: 2022-05-16
Payer: COMMERCIAL

## 2022-05-16 VITALS — BODY MASS INDEX: 30.62 KG/M2 | WEIGHT: 245 LBS

## 2022-05-16 PROCEDURE — 93798 PHYS/QHP OP CAR RHAB W/ECG: CPT

## 2022-05-19 ENCOUNTER — HOSPITAL ENCOUNTER (OUTPATIENT)
Dept: CARDIAC REHAB | Age: 52
Discharge: HOME | End: 2022-05-19
Payer: COMMERCIAL

## 2022-05-19 VITALS — BODY MASS INDEX: 30.5 KG/M2 | WEIGHT: 244 LBS

## 2022-05-19 PROCEDURE — 93798 PHYS/QHP OP CAR RHAB W/ECG: CPT

## 2022-05-20 ENCOUNTER — TRANSCRIBE ORDER (OUTPATIENT)
Dept: REGISTRATION | Age: 52
End: 2022-05-20

## 2022-05-20 ENCOUNTER — HOSPITAL ENCOUNTER (OUTPATIENT)
Dept: LAB | Age: 52
Discharge: HOME | End: 2022-05-20
Payer: COMMERCIAL

## 2022-05-20 DIAGNOSIS — E87.6 HYPOKALEMIA: ICD-10-CM

## 2022-05-20 DIAGNOSIS — N18.9 CKD (CHRONIC KIDNEY DISEASE): ICD-10-CM

## 2022-05-20 DIAGNOSIS — N18.9 CKD (CHRONIC KIDNEY DISEASE): Primary | ICD-10-CM

## 2022-05-20 LAB
ANION GAP SERPL CALC-SCNC: 6 MMOL/L (ref 5–15)
BUN SERPL-MCNC: 43 MG/DL (ref 6–20)
BUN/CREAT SERPL: 12 (ref 12–20)
CA-I BLD-MCNC: 8.6 MG/DL (ref 8.5–10.1)
CHLORIDE SERPL-SCNC: 102 MMOL/L (ref 97–108)
CO2 SERPL-SCNC: 30 MMOL/L (ref 21–32)
CREAT SERPL-MCNC: 3.73 MG/DL (ref 0.7–1.3)
GLUCOSE SERPL-MCNC: 229 MG/DL (ref 65–100)
MAGNESIUM SERPL-MCNC: 2 MG/DL (ref 1.6–2.4)
POTASSIUM SERPL-SCNC: 3.7 MMOL/L (ref 3.5–5.1)
SODIUM SERPL-SCNC: 138 MMOL/L (ref 136–145)

## 2022-05-20 PROCEDURE — 80048 BASIC METABOLIC PNL TOTAL CA: CPT

## 2022-05-20 PROCEDURE — 36415 COLL VENOUS BLD VENIPUNCTURE: CPT

## 2022-05-20 PROCEDURE — 83735 ASSAY OF MAGNESIUM: CPT

## 2022-05-23 ENCOUNTER — HOSPITAL ENCOUNTER (OUTPATIENT)
Dept: CARDIAC REHAB | Age: 52
Discharge: HOME | End: 2022-05-23
Payer: COMMERCIAL

## 2022-05-23 VITALS — WEIGHT: 246 LBS | BODY MASS INDEX: 30.75 KG/M2

## 2022-05-23 PROCEDURE — 93798 PHYS/QHP OP CAR RHAB W/ECG: CPT

## 2022-05-27 ENCOUNTER — HOSPITAL ENCOUNTER (OUTPATIENT)
Dept: LAB | Age: 52
Discharge: HOME | End: 2022-05-27
Payer: COMMERCIAL

## 2022-05-27 ENCOUNTER — TRANSCRIBE ORDER (OUTPATIENT)
Dept: REGISTRATION | Age: 52
End: 2022-05-27

## 2022-05-27 DIAGNOSIS — N18.9 CKD (CHRONIC KIDNEY DISEASE): Primary | ICD-10-CM

## 2022-05-27 DIAGNOSIS — N18.9 CKD (CHRONIC KIDNEY DISEASE): ICD-10-CM

## 2022-05-27 DIAGNOSIS — E87.6 HYPOKALEMIA: ICD-10-CM

## 2022-05-27 LAB
ANION GAP SERPL CALC-SCNC: 7 MMOL/L (ref 5–15)
BUN SERPL-MCNC: 37 MG/DL (ref 6–20)
BUN/CREAT SERPL: 9 (ref 12–20)
CA-I BLD-MCNC: 8.9 MG/DL (ref 8.5–10.1)
CHLORIDE SERPL-SCNC: 104 MMOL/L (ref 97–108)
CO2 SERPL-SCNC: 28 MMOL/L (ref 21–32)
CREAT SERPL-MCNC: 4.01 MG/DL (ref 0.7–1.3)
GLUCOSE SERPL-MCNC: 247 MG/DL (ref 65–100)
MAGNESIUM SERPL-MCNC: 1.8 MG/DL (ref 1.6–2.4)
POTASSIUM SERPL-SCNC: 4 MMOL/L (ref 3.5–5.1)
SODIUM SERPL-SCNC: 139 MMOL/L (ref 136–145)

## 2022-05-27 PROCEDURE — 83735 ASSAY OF MAGNESIUM: CPT

## 2022-05-27 PROCEDURE — 80048 BASIC METABOLIC PNL TOTAL CA: CPT

## 2022-05-27 PROCEDURE — 36415 COLL VENOUS BLD VENIPUNCTURE: CPT

## 2022-06-02 ENCOUNTER — HOSPITAL ENCOUNTER (OUTPATIENT)
Dept: CARDIAC REHAB | Age: 52
Discharge: HOME | End: 2022-06-02
Payer: COMMERCIAL

## 2022-06-02 VITALS — WEIGHT: 245 LBS | BODY MASS INDEX: 30.62 KG/M2

## 2022-06-02 PROCEDURE — 93798 PHYS/QHP OP CAR RHAB W/ECG: CPT

## 2022-06-07 ENCOUNTER — TRANSCRIBE ORDER (OUTPATIENT)
Dept: REGISTRATION | Age: 52
End: 2022-06-07

## 2022-06-07 ENCOUNTER — HOSPITAL ENCOUNTER (OUTPATIENT)
Dept: LAB | Age: 52
Discharge: HOME | End: 2022-06-07
Payer: COMMERCIAL

## 2022-06-07 DIAGNOSIS — N17.9 AKI (ACUTE KIDNEY INJURY) (HCC): ICD-10-CM

## 2022-06-07 DIAGNOSIS — N18.9 CKD (CHRONIC KIDNEY DISEASE): ICD-10-CM

## 2022-06-07 DIAGNOSIS — N17.9 AKI (ACUTE KIDNEY INJURY) (HCC): Primary | ICD-10-CM

## 2022-06-07 LAB
ALBUMIN SERPL-MCNC: 2.8 G/DL (ref 3.5–5)
ALBUMIN/GLOB SERPL: 0.6 {RATIO} (ref 1.1–2.2)
ALP SERPL-CCNC: 67 U/L (ref 45–117)
ALT SERPL-CCNC: 18 U/L (ref 12–78)
ANION GAP SERPL CALC-SCNC: 9 MMOL/L (ref 5–15)
AST SERPL W P-5'-P-CCNC: 16 U/L (ref 15–37)
BILIRUB SERPL-MCNC: 0.5 MG/DL (ref 0.2–1)
BUN SERPL-MCNC: 43 MG/DL (ref 6–20)
BUN/CREAT SERPL: 10 (ref 12–20)
CA-I BLD-MCNC: 8.9 MG/DL (ref 8.5–10.1)
CHLORIDE SERPL-SCNC: 98 MMOL/L (ref 97–108)
CO2 SERPL-SCNC: 28 MMOL/L (ref 21–32)
CREAT SERPL-MCNC: 4.48 MG/DL (ref 0.7–1.3)
GLOBULIN SER CALC-MCNC: 4.7 G/DL (ref 2–4)
GLUCOSE SERPL-MCNC: 429 MG/DL (ref 65–100)
POTASSIUM SERPL-SCNC: 3.7 MMOL/L (ref 3.5–5.1)
PROT SERPL-MCNC: 7.5 G/DL (ref 6.4–8.2)
SODIUM SERPL-SCNC: 135 MMOL/L (ref 136–145)

## 2022-06-07 PROCEDURE — 36415 COLL VENOUS BLD VENIPUNCTURE: CPT

## 2022-06-07 PROCEDURE — 80053 COMPREHEN METABOLIC PANEL: CPT

## 2022-06-09 ENCOUNTER — HOSPITAL ENCOUNTER (OUTPATIENT)
Dept: CARDIAC REHAB | Age: 52
Discharge: HOME | End: 2022-06-09
Payer: COMMERCIAL

## 2022-06-09 VITALS — WEIGHT: 244 LBS | BODY MASS INDEX: 30.5 KG/M2

## 2022-06-09 PROCEDURE — 93798 PHYS/QHP OP CAR RHAB W/ECG: CPT

## 2022-06-13 ENCOUNTER — HOSPITAL ENCOUNTER (OUTPATIENT)
Dept: CARDIAC REHAB | Age: 52
Discharge: HOME | End: 2022-06-13
Payer: COMMERCIAL

## 2022-06-13 VITALS — BODY MASS INDEX: 30.62 KG/M2 | WEIGHT: 245 LBS

## 2022-06-13 PROCEDURE — 93798 PHYS/QHP OP CAR RHAB W/ECG: CPT

## 2022-06-15 ENCOUNTER — TRANSCRIBE ORDER (OUTPATIENT)
Dept: SCHEDULING | Age: 52
End: 2022-06-15

## 2022-06-15 DIAGNOSIS — R14.0 ABDOMINAL DISTENSION: Primary | ICD-10-CM

## 2022-06-16 ENCOUNTER — HOSPITAL ENCOUNTER (OUTPATIENT)
Dept: CARDIAC REHAB | Age: 52
Discharge: HOME | End: 2022-06-16
Payer: COMMERCIAL

## 2022-06-16 VITALS — WEIGHT: 245 LBS | BODY MASS INDEX: 30.62 KG/M2

## 2022-06-16 PROCEDURE — 93798 PHYS/QHP OP CAR RHAB W/ECG: CPT

## 2022-07-11 ENCOUNTER — APPOINTMENT (OUTPATIENT)
Dept: CARDIAC REHAB | Age: 52
End: 2022-07-11

## 2022-07-14 ENCOUNTER — APPOINTMENT (OUTPATIENT)
Dept: CARDIAC REHAB | Age: 52
End: 2022-07-14

## 2022-07-18 ENCOUNTER — APPOINTMENT (OUTPATIENT)
Dept: CARDIAC REHAB | Age: 52
End: 2022-07-18

## 2022-07-21 ENCOUNTER — APPOINTMENT (OUTPATIENT)
Dept: CARDIAC REHAB | Age: 52
End: 2022-07-21

## 2022-07-24 DIAGNOSIS — I50.22 CHRONIC SYSTOLIC CONGESTIVE HEART FAILURE (HCC): Primary | ICD-10-CM

## 2022-07-24 DIAGNOSIS — E11.65 TYPE 2 DIABETES MELLITUS WITH HYPERGLYCEMIA, WITH LONG-TERM CURRENT USE OF INSULIN (HCC): ICD-10-CM

## 2022-07-24 DIAGNOSIS — Z79.4 TYPE 2 DIABETES MELLITUS WITH HYPERGLYCEMIA, WITH LONG-TERM CURRENT USE OF INSULIN (HCC): ICD-10-CM

## 2022-07-24 RX ORDER — POTASSIUM CHLORIDE 20 MEQ/1
20 TABLET, EXTENDED RELEASE ORAL 3 TIMES DAILY
Qty: 270 TABLET | Refills: 0 | Status: SHIPPED | OUTPATIENT
Start: 2022-07-24 | End: 2022-08-04 | Stop reason: ALTCHOICE

## 2022-07-24 RX ORDER — INSULIN GLARGINE 100 [IU]/ML
30 INJECTION, SOLUTION SUBCUTANEOUS DAILY
Qty: 27 ML | Refills: 0 | Status: SHIPPED | OUTPATIENT
Start: 2022-07-24 | End: 2022-08-04 | Stop reason: SDUPTHER

## 2022-07-25 ENCOUNTER — APPOINTMENT (OUTPATIENT)
Dept: CARDIAC REHAB | Age: 52
End: 2022-07-25

## 2022-07-28 ENCOUNTER — APPOINTMENT (OUTPATIENT)
Dept: CARDIAC REHAB | Age: 52
End: 2022-07-28

## 2022-08-01 ENCOUNTER — APPOINTMENT (OUTPATIENT)
Dept: CARDIAC REHAB | Age: 52
End: 2022-08-01

## 2022-08-01 DIAGNOSIS — I10 ESSENTIAL HYPERTENSION: ICD-10-CM

## 2022-08-01 RX ORDER — HYDRALAZINE HYDROCHLORIDE 10 MG/1
TABLET, FILM COATED ORAL
Qty: 540 TABLET | Refills: 0 | Status: SHIPPED | OUTPATIENT
Start: 2022-08-01 | End: 2022-10-30

## 2022-08-04 ENCOUNTER — OFFICE VISIT (OUTPATIENT)
Dept: PRIMARY CARE CLINIC | Age: 52
End: 2022-08-04
Payer: COMMERCIAL

## 2022-08-04 ENCOUNTER — APPOINTMENT (OUTPATIENT)
Dept: CARDIAC REHAB | Age: 52
End: 2022-08-04

## 2022-08-04 VITALS
HEART RATE: 88 BPM | SYSTOLIC BLOOD PRESSURE: 120 MMHG | WEIGHT: 245.2 LBS | RESPIRATION RATE: 18 BRPM | TEMPERATURE: 97.7 F | DIASTOLIC BLOOD PRESSURE: 72 MMHG | OXYGEN SATURATION: 96 % | HEIGHT: 73 IN | BODY MASS INDEX: 32.5 KG/M2

## 2022-08-04 DIAGNOSIS — I10 ESSENTIAL HYPERTENSION: ICD-10-CM

## 2022-08-04 DIAGNOSIS — I25.10 CAD, MULTIPLE VESSEL: ICD-10-CM

## 2022-08-04 DIAGNOSIS — E11.9 INSULIN DEPENDENT TYPE 2 DIABETES MELLITUS (HCC): Primary | ICD-10-CM

## 2022-08-04 DIAGNOSIS — E27.40 ALDOSTERONE DEFICIENCY (HCC): ICD-10-CM

## 2022-08-04 DIAGNOSIS — K40.90 LEFT INGUINAL HERNIA: ICD-10-CM

## 2022-08-04 DIAGNOSIS — D64.9 ANEMIA, UNSPECIFIED TYPE: ICD-10-CM

## 2022-08-04 DIAGNOSIS — M48.00 SPINAL STENOSIS, UNSPECIFIED SPINAL REGION: ICD-10-CM

## 2022-08-04 DIAGNOSIS — N40.0 BENIGN PROSTATIC HYPERPLASIA WITHOUT LOWER URINARY TRACT SYMPTOMS: ICD-10-CM

## 2022-08-04 DIAGNOSIS — E66.09 CLASS 1 OBESITY DUE TO EXCESS CALORIES WITH SERIOUS COMORBIDITY AND BODY MASS INDEX (BMI) OF 31.0 TO 31.9 IN ADULT: ICD-10-CM

## 2022-08-04 DIAGNOSIS — N18.4 CKD (CHRONIC KIDNEY DISEASE) STAGE 4, GFR 15-29 ML/MIN (HCC): ICD-10-CM

## 2022-08-04 DIAGNOSIS — E78.2 MIXED HYPERLIPIDEMIA: ICD-10-CM

## 2022-08-04 DIAGNOSIS — Z79.4 INSULIN DEPENDENT TYPE 2 DIABETES MELLITUS (HCC): Primary | ICD-10-CM

## 2022-08-04 DIAGNOSIS — I50.22 CHRONIC SYSTOLIC CONGESTIVE HEART FAILURE (HCC): ICD-10-CM

## 2022-08-04 LAB — HBA1C MFR BLD HPLC: 14 %

## 2022-08-04 PROCEDURE — 83036 HEMOGLOBIN GLYCOSYLATED A1C: CPT | Performed by: NURSE PRACTITIONER

## 2022-08-04 PROCEDURE — 3046F HEMOGLOBIN A1C LEVEL >9.0%: CPT | Performed by: NURSE PRACTITIONER

## 2022-08-04 PROCEDURE — 99214 OFFICE O/P EST MOD 30 MIN: CPT | Performed by: NURSE PRACTITIONER

## 2022-08-04 RX ORDER — BLOOD SUGAR DIAGNOSTIC
STRIP MISCELLANEOUS SEE ADMIN INSTRUCTIONS
COMMUNITY
Start: 2022-05-12

## 2022-08-04 RX ORDER — CARVEDILOL 25 MG/1
25 TABLET ORAL 2 TIMES DAILY
COMMUNITY
Start: 2022-07-22 | End: 2022-08-21 | Stop reason: ALTCHOICE

## 2022-08-04 RX ORDER — INSULIN ASPART 100 [IU]/ML
10 INJECTION, SOLUTION INTRAVENOUS; SUBCUTANEOUS
Qty: 27 ML | Refills: 1 | Status: SHIPPED | OUTPATIENT
Start: 2022-08-04 | End: 2022-11-02

## 2022-08-04 RX ORDER — INSULIN ASPART 100 [IU]/ML
INJECTION, SOLUTION INTRAVENOUS; SUBCUTANEOUS
COMMUNITY
End: 2022-08-04 | Stop reason: ALTCHOICE

## 2022-08-04 RX ORDER — POTASSIUM CHLORIDE 750 MG/1
20 TABLET, EXTENDED RELEASE ORAL 3 TIMES DAILY
Qty: 540 TABLET | Refills: 1 | Status: SHIPPED | OUTPATIENT
Start: 2022-08-04

## 2022-08-04 RX ORDER — INSULIN GLARGINE 100 [IU]/ML
40 INJECTION, SOLUTION SUBCUTANEOUS DAILY
Qty: 36 ML | Refills: 1 | Status: SHIPPED | OUTPATIENT
Start: 2022-08-04 | End: 2022-11-02

## 2022-08-04 RX ORDER — ATORVASTATIN CALCIUM 20 MG/1
20 TABLET, FILM COATED ORAL DAILY
COMMUNITY
Start: 2022-06-26

## 2022-08-04 RX ORDER — PEN NEEDLE, DIABETIC 32GX 5/32"
NEEDLE, DISPOSABLE MISCELLANEOUS
COMMUNITY
Start: 2022-05-12

## 2022-08-04 RX ORDER — FLASH GLUCOSE SCANNING READER
EACH MISCELLANEOUS
Qty: 1 EACH | Refills: 0 | Status: SHIPPED | OUTPATIENT
Start: 2022-08-04

## 2022-08-04 RX ORDER — FLASH GLUCOSE SENSOR
KIT MISCELLANEOUS
Qty: 4 KIT | Refills: 11 | Status: SHIPPED | OUTPATIENT
Start: 2022-08-04

## 2022-08-04 NOTE — PROGRESS NOTES
Chief Complaint   Patient presents with    Diabetes     Follow up      1. \"Have you been to the ER, urgent care clinic since your last visit? Hospitalized since your last visit? \" No    2. \"Have you seen or consulted any other health care providers outside of the [address] [place] [address] since your last visit? \" No     3. For patients aged 39-70: Has the patient had a colonoscopy / FIT/ Cologuard? No      If the patient is female:    4. For patients aged 41-77: Has the patient had a mammogram within the past 2 years? NA - based on age or sex      11. For patients aged 21-65: Has the patient had a pap smear?  NA - based on age or sex

## 2022-08-08 ENCOUNTER — APPOINTMENT (OUTPATIENT)
Dept: CARDIAC REHAB | Age: 52
End: 2022-08-08

## 2022-08-11 ENCOUNTER — APPOINTMENT (OUTPATIENT)
Dept: CARDIAC REHAB | Age: 52
End: 2022-08-11

## 2022-08-15 ENCOUNTER — HOSPITAL ENCOUNTER (OUTPATIENT)
Dept: ULTRASOUND IMAGING | Age: 52
Discharge: HOME | End: 2022-08-15
Attending: NURSE PRACTITIONER
Payer: COMMERCIAL

## 2022-08-15 ENCOUNTER — APPOINTMENT (OUTPATIENT)
Dept: CARDIAC REHAB | Age: 52
End: 2022-08-15

## 2022-08-15 DIAGNOSIS — R14.0 ABDOMINAL DISTENSION: ICD-10-CM

## 2022-08-15 PROCEDURE — 76700 US EXAM ABDOM COMPLETE: CPT

## 2022-08-18 ENCOUNTER — APPOINTMENT (OUTPATIENT)
Dept: CARDIAC REHAB | Age: 52
End: 2022-08-18

## 2022-08-21 ENCOUNTER — TELEPHONE (OUTPATIENT)
Dept: PRIMARY CARE CLINIC | Age: 52
End: 2022-08-21

## 2022-08-21 PROBLEM — R73.03 PREDIABETES: Status: RESOLVED | Noted: 2021-03-28 | Resolved: 2022-08-21

## 2022-08-21 PROBLEM — R07.9 CHEST PAIN: Status: RESOLVED | Noted: 2022-04-08 | Resolved: 2022-08-21

## 2022-08-21 PROBLEM — Z53.20 COLON CANCER SCREENING DECLINED: Status: ACTIVE | Noted: 2022-08-21

## 2022-08-21 RX ORDER — FUROSEMIDE 40 MG/1
1 TABLET ORAL 2 TIMES DAILY
COMMUNITY

## 2022-08-21 NOTE — PROGRESS NOTES
Lisandro Carrion is a 50 y.o. male who presents to the office today for the following:    Chief Complaint   Patient presents with    Diabetes       Past Medical History:   Diagnosis Date    Abdominal hernia 1/29/2020    Benign prostatic hyperplasia 3/16/2021    Chronic kidney disease     congenital polycytic kidneys    Congenital polycystic kidney 3/16/2021    Congestive heart failure ([address] 3/25/2020    Coronary arteriosclerosis 3/25/2020    Diabetes ([address]     Heart attack ([address] 3/18/2020 and 06/30/2020    History of COVID-19 3/28/2021    Hyperlipidemia     Hypertension     Ill-defined condition     BPH, prostatitis    Ill-defined condition     hernia    Ill-defined condition     ischemic cardiomyopathy    Ill-defined condition     (r) knee pain    Inguinal hernia     Morbid obesity ([address]     Prediabetes 3/28/2021    Spinal stenosis        Past Surgical History:   Procedure Laterality Date    HX HERNIA REPAIR  1903    umbiblical hernia    [place] CARDIAC SURG PROCEDURE UNLIST  06/30/2020    Stent placement    [place] CARDIAC SURG PROCEDURE UNLIST  04/08/2022    stent placement- has total 4        Family History   Problem Relation Age of Onset    Hypertension Mother     Diabetes Mother     Kidney Disease Mother     Heart Disease Father     Hypertension Sister     Diabetes Brother     Kidney Disease Brother     No Known Problems Sister         Social History     Tobacco Use    Smoking status: Never    Smokeless tobacco: Never   Vaping Use    Vaping Use: Never used   Substance Use Topics    Alcohol use: Not Currently    Drug use: Never      HPI  Patient  with PMH of CAD w/ stents, hyperlipidemia, hypertension, CHF, BPH, prediabetes, CKD, covid 19,chronic back pain, hernia and obesity who is here for follow up of chronic conditions. States that he is taking medications as directed. Follows with cardiology and recently referred to new nephrologist who he has upcoming appointment with.  Has not seen the endocrinologist still. Reports fasting sugars have been doing better in the past few weeks under 200. States that he does sometimes miss insulin doses due to work schedule. Is now following with [facility]. Was changed to bumex but felt he had more swelling so went back to lasix. Current Outpatient Medications on File Prior to Visit   Medication Sig    furosemide (LASIX) 40 mg tablet Take 1 Tablet by mouth two (2) times a day.  hydrALAZINE (APRESOLINE) 10 mg tablet TAKE 2 TABLETS BY MOUTH THREE TIMES DAILY FOR 90 DAYS    flash glucose scanning reader (FreeStyle Ceci 2 [place]) misc Use reader to check glucose 4 times daily    flash glucose sensor (FreeStyle Ceci 2 Sensor) kit Use 1 sensor every 14 days    furosemide (LASIX) 40 mg tablet Take 2 Tablets by mouth daily.  [DISCONTINUED] furosemide (LASIX) 20 mg tablet Take 1 Tablet by mouth daily. In the evening    carvediloL (COREG) 12.5 mg tablet Take 3 Tablets by mouth two (2) times daily (with meals). 37.5mg    aspirin delayed-release 81 mg tablet TAKE 1 TABLET BY MOUTH DAILY    ezetimibe (ZETIA) 10 mg tablet TAKE 1 TABLET BY MOUTH DAILY    isosorbide mononitrate ER (IMDUR) 30 mg tablet TAKE 1 TABLET BY MOUTH DAILY    prasugreL (EFFIENT) 10 mg tablet TAKE 1 TABLET BY MOUTH DAILY    tamsulosin (FLOMAX) 0.4 mg capsule TAKE 1 CAPSULE BY MOUTH DAILY    HYDROcodone-acetaminophen (NORCO) 5-325 mg per tablet Dr Maris Palm nitroglycerin (NITROSTAT) 0.4 mg SL tablet ONE TABLET UNDER TONGUE AS NEEDED FOR CHEST PAIN     No current facility-administered medications on file prior to visit. Medications Ordered Today   Medications    potassium chloride (KLOR-CON M10) 10 mEq tablet     Sig: Take 2 Tablets by mouth three (3) times daily. Dispense:  540 Tablet     Refill:  1    insulin aspart U-100 (NovoLOG Flexpen U-100 Insulin) 100 unit/mL (3 mL) inpn     Sig: 10 Units by SubCUTAneous route Before breakfast, lunch, and dinner for 90 days.  Sliding scale Dispense:  27 mL     Refill:  1    insulin glargine (Semglee Pen U-100 Insulin) 100 unit/mL (3 mL) inpn     Sig: Take 40 Units by SubCUTAneous route in the morning for 90 days. Dispense:  36 mL     Refill:  1    flash glucose scanning reader (FreeStyle Ceci 14 Day [place]) misc     Sig: Use to check glucose 4 times daily     Dispense:  1 Each     Refill:  0     [id number]    flash glucose sensor (FreeStyle Ceci 14 Day Sensor) kit     Sig: Use to check glucose 4 times daily     Dispense:  4 Kit     Refill:  11     [id number]        Review of Systems   Constitutional: Negative. HENT:  Negative for ear pain and sore throat. Eyes: Negative. Respiratory:  Positive for shortness of breath. Negative for hemoptysis, wheezing and stridor. Cardiovascular:  Positive for leg swelling. Negative for chest pain, palpitations, claudication and PND. Gastrointestinal: Negative. Genitourinary: Negative. Negative for dysuria, frequency and urgency. Musculoskeletal:  Positive for back pain and myalgias. Negative for falls. Neurological: Negative. Psychiatric/Behavioral: Negative. Visit Vitals  /72 (BP 1 Location: Left upper arm, BP Patient Position: Sitting, BP Cuff Size: Large adult)   Pulse 88   Temp 97.7 °F (36.5 °C) (Temporal)   Resp 18   Ht 6' 1\" (1.854 m)   Wt 245 lb 3.2 oz (111.2 kg)   SpO2 96%   BMI 32.35 kg/m²       Physical Exam  Vitals and nursing note reviewed. Constitutional:       Appearance: Normal appearance. He is obese. Eyes:      Pupils: Pupils are equal, round, and reactive to light. Neck:      Vascular: No carotid bruit. Cardiovascular:      Rate and Rhythm: Normal rate and regular rhythm. Pulses: Normal pulses. Pulmonary:      Effort: Pulmonary effort is normal.      Breath sounds: Normal breath sounds. Abdominal:      General: Bowel sounds are normal.      Palpations: Abdomen is soft. Tenderness: There is no abdominal tenderness. Hernia: A hernia is present. Musculoskeletal:         General: Normal range of motion. Right lower leg: Edema (trace) present. Left lower leg: Edema (trace) present. Lymphadenopathy:      Cervical: No cervical adenopathy. Skin:     General: Skin is warm and dry. Neurological:      Mental Status: He is alert and oriented to person, place, and time. Mental status is at baseline. Psychiatric:         Mood and Affect: Mood normal.         Behavior: Behavior normal.          1. Chronic systolic congestive heart failure ([address]   Echo at [facility] on 6/16/22 showed EF 10-15% with severe global hypokinesis and dilated LV. On lasix 80mg twice daily as he declined transition to entresto and bumex  Is declining further medication change/titration with [facility] per notes and wishes to only continue medical management as is despite no improvement with EF. Also declined eval again at [facility] or placement of AICD    2. CAD, multiple vessel  Patient has h/o MI and 3 stents with last 2 placed in 6/2020  Remains on effient and asa  Follows with Dr. Regina Anguiano at [facility] cardiology and will continue care with them    3. Essential hypertension  Blood pressure is essentially controlled and continue medication as directed  Monitor at home and notify provider if > 140/90    4. Benign prostatic hyperplasia without lower urinary tract symptoms  No current symptoms and is on flomax    5.  Type 2 diabetes mellitus with hyperglycemia, without long-term current use of insulin ([facility])  Lab Results   Component Value Date/Time    Hemoglobin A1c 13.8 (H) 04/09/2022 02:58 AM    Hemoglobin A1c (POC) 14.0 08/04/2022 10:55 AM     A1c remains uncontrolled at 14.0  Did not tolerate farxiga or Jardiance previously  Continues to report home readings are under 200 fasting but did not bring meter  No polyuria or polydipsia sx  Reports he has been trying to work on reduce carbs in diet and exercise more regularly  Misses doses of insulin due to work schedule    Increase lantus to 50 units nightly  Encourage compliance with Novolog prior to meals  Check sugars 2-3 times daily and bring meter to next appointment  Re-order freestyle sae to send to [address] changes that I feel will be beneficial as well as encourage regular exercise 3-5 times weekly  Encourage diabetic eye and foot exams  Going to send another referral to endocrinology and highly encouraged to schedule  Have re-interated importance in adequate glucose to prevent serious and life threatening health risks    6. Spinal stenosis, unspecified spinal region  Symptoms reported as stable and is on chronic pain medication  Follows with pain specialist in [place]    7. Class 1 obesity due to excess calories with serious comorbidity and body mass index (BMI) of 31.0 to 31.9 in adult  Continue to encourage weight loss. Recommend decreasing excess fat, salt and sugar in diet along with getting regular exercise 3-5 times weekly     8. Mixed hyperlipidemia  ontinues on atorvastatin 40mg as directed (highest dose tolerated). 9. Left inguinal hernia  Has discomfort at site still and has to manually reduce  Was referred to Dr. Swati Matias who has recommended repair but  Will have to have cardiac clearance for any surgical intervention    10. Stage 4 chronic kidney disease ([address]  Lab Results   Component Value Date/Time    Creatinine 4.48 (H) 06/07/2022 12:28 PM   Has been referred to new nephrologist and appointment is on 8/16/22  Will be having labs with nephrology at the appointment    11. Aldosterone deficiency ([address]  Was recommended by endocrinology at [facility] to have further work up for potential aldosterone insufficiency but has still not contacted to schedule with endocrinology    12.  Anemia  Lab Results   Component Value Date/Time    WBC 7.3 04/09/2022 02:58 AM    HGB 9.9 (L) 04/09/2022 02:58 AM    HCT 32.6 (L) 04/09/2022 02:58 AM    PLATELET 675 06/43/8085 02:58 AM    MCV 78.7 (L) 04/09/2022 02:58 AM     Lab Results   Component Value Date/Time    Ferritin 41 11/16/2021 04:14 PM   Is having labs with nephrology at appointment 8/16/22  Colon screening was declined and is aware of risks which could lead to poor outcomes or be life threatening. Patient/wife verbalizes understanding of plan of care as discussed above    Follow-up and Dispositions    Return in about 4 weeks (around 9/1/2022) for or sooner for worsening symptoms.

## 2022-08-21 NOTE — TELEPHONE ENCOUNTER
Please follow up for home blood glucose readings and if has scheduled with endocrinology (referral resent).

## 2022-08-22 ENCOUNTER — APPOINTMENT (OUTPATIENT)
Dept: CARDIAC REHAB | Age: 52
End: 2022-08-22

## 2022-08-25 ENCOUNTER — APPOINTMENT (OUTPATIENT)
Dept: CARDIAC REHAB | Age: 52
End: 2022-08-25

## 2022-08-29 ENCOUNTER — APPOINTMENT (OUTPATIENT)
Dept: CARDIAC REHAB | Age: 52
End: 2022-08-29

## 2022-09-01 ENCOUNTER — APPOINTMENT (OUTPATIENT)
Dept: CARDIAC REHAB | Age: 52
End: 2022-09-01

## 2022-09-27 ENCOUNTER — HOSPITAL ENCOUNTER (OUTPATIENT)
Dept: INFUSION THERAPY | Age: 52
Discharge: HOME | End: 2022-09-27
Payer: COMMERCIAL

## 2022-09-27 VITALS
RESPIRATION RATE: 18 BRPM | OXYGEN SATURATION: 100 % | TEMPERATURE: 97 F | HEIGHT: 75 IN | BODY MASS INDEX: 29.34 KG/M2 | WEIGHT: 236 LBS | SYSTOLIC BLOOD PRESSURE: 118 MMHG | HEART RATE: 82 BPM | DIASTOLIC BLOOD PRESSURE: 76 MMHG

## 2022-09-27 PROCEDURE — 74011250636 HC RX REV CODE- 250/636: Performed by: INTERNAL MEDICINE

## 2022-09-27 PROCEDURE — 96365 THER/PROPH/DIAG IV INF INIT: CPT

## 2022-09-27 RX ADMIN — SODIUM CHLORIDE 300 MG: 9 INJECTION, SOLUTION INTRAVENOUS at 10:42

## 2022-10-04 ENCOUNTER — HOSPITAL ENCOUNTER (OUTPATIENT)
Dept: INFUSION THERAPY | Age: 52
Discharge: HOME | End: 2022-10-04
Payer: COMMERCIAL

## 2022-10-04 VITALS
OXYGEN SATURATION: 98 % | TEMPERATURE: 97.3 F | RESPIRATION RATE: 20 BRPM | HEART RATE: 86 BPM | DIASTOLIC BLOOD PRESSURE: 86 MMHG | SYSTOLIC BLOOD PRESSURE: 131 MMHG

## 2022-10-04 PROCEDURE — 96365 THER/PROPH/DIAG IV INF INIT: CPT

## 2022-10-04 PROCEDURE — 74011250636 HC RX REV CODE- 250/636: Performed by: INTERNAL MEDICINE

## 2022-10-04 RX ADMIN — SODIUM CHLORIDE: 9 INJECTION, SOLUTION INTRAVENOUS at 10:00

## 2022-10-11 ENCOUNTER — HOSPITAL ENCOUNTER (OUTPATIENT)
Dept: INFUSION THERAPY | Age: 52
Discharge: HOME | End: 2022-10-11
Payer: COMMERCIAL

## 2022-10-11 VITALS
HEART RATE: 90 BPM | OXYGEN SATURATION: 98 % | SYSTOLIC BLOOD PRESSURE: 133 MMHG | DIASTOLIC BLOOD PRESSURE: 91 MMHG | TEMPERATURE: 97.3 F | RESPIRATION RATE: 20 BRPM

## 2022-10-11 PROCEDURE — 74011250636 HC RX REV CODE- 250/636: Performed by: INTERNAL MEDICINE

## 2022-10-11 PROCEDURE — 96365 THER/PROPH/DIAG IV INF INIT: CPT

## 2022-10-11 RX ADMIN — IRON SUCROSE: 20 INJECTION, SOLUTION INTRAVENOUS at 09:49

## 2022-10-30 DIAGNOSIS — I10 ESSENTIAL HYPERTENSION: ICD-10-CM

## 2022-10-30 RX ORDER — HYDRALAZINE HYDROCHLORIDE 10 MG/1
TABLET, FILM COATED ORAL
Qty: 540 TABLET | Refills: 0 | Status: SHIPPED | OUTPATIENT
Start: 2022-10-30

## 2023-01-04 ENCOUNTER — APPOINTMENT (OUTPATIENT)
Dept: GENERAL RADIOLOGY | Age: 53
End: 2023-01-04
Attending: EMERGENCY MEDICINE
Payer: COMMERCIAL

## 2023-01-04 ENCOUNTER — HOSPITAL ENCOUNTER (EMERGENCY)
Age: 53
Discharge: HOME | End: 2023-01-04
Attending: EMERGENCY MEDICINE
Payer: COMMERCIAL

## 2023-01-04 VITALS
OXYGEN SATURATION: 98 % | HEART RATE: 87 BPM | HEIGHT: 74 IN | DIASTOLIC BLOOD PRESSURE: 95 MMHG | BODY MASS INDEX: 30.54 KG/M2 | RESPIRATION RATE: 17 BRPM | WEIGHT: 238 LBS | SYSTOLIC BLOOD PRESSURE: 129 MMHG | TEMPERATURE: 98.1 F

## 2023-01-04 DIAGNOSIS — E87.79 VOLUME OVERLOAD STATE OF HEART: ICD-10-CM

## 2023-01-04 DIAGNOSIS — R07.89 OTHER CHEST PAIN: Primary | ICD-10-CM

## 2023-01-04 LAB
ALBUMIN SERPL-MCNC: 2.8 G/DL (ref 3.5–5)
ALBUMIN/GLOB SERPL: 0.6 {RATIO} (ref 1.1–2.2)
ALP SERPL-CCNC: 94 U/L (ref 45–117)
ALT SERPL-CCNC: 29 U/L (ref 12–78)
ANION GAP SERPL CALC-SCNC: 10 MMOL/L (ref 5–15)
AST SERPL W P-5'-P-CCNC: 27 U/L (ref 15–37)
BASOPHILS # BLD: 0 K/UL (ref 0–0.1)
BASOPHILS NFR BLD: 0 % (ref 0–1)
BILIRUB SERPL-MCNC: 0.7 MG/DL (ref 0.2–1)
BNP SERPL-MCNC: 5074 PG/ML
BUN SERPL-MCNC: 37 MG/DL (ref 6–20)
BUN/CREAT SERPL: 9 (ref 12–20)
CA-I BLD-MCNC: 8.5 MG/DL (ref 8.5–10.1)
CHLORIDE SERPL-SCNC: 94 MMOL/L (ref 97–108)
CO2 SERPL-SCNC: 27 MMOL/L (ref 21–32)
CREAT SERPL-MCNC: 4.11 MG/DL (ref 0.7–1.3)
DIFFERENTIAL METHOD BLD: ABNORMAL
EOSINOPHIL # BLD: 0.2 K/UL (ref 0–0.4)
EOSINOPHIL NFR BLD: 2 % (ref 0–7)
ERYTHROCYTE [DISTWIDTH] IN BLOOD BY AUTOMATED COUNT: 13.3 % (ref 11.5–14.5)
GLOBULIN SER CALC-MCNC: 4.8 G/DL (ref 2–4)
GLUCOSE SERPL-MCNC: 520 MG/DL (ref 65–100)
HCT VFR BLD AUTO: 43.1 % (ref 36.6–50.3)
HGB BLD-MCNC: 14.3 G/DL (ref 12.1–17)
IMM GRANULOCYTES # BLD AUTO: 0 K/UL (ref 0–0.04)
IMM GRANULOCYTES NFR BLD AUTO: 0 % (ref 0–0.5)
INR PPP: 1 (ref 0.9–1.1)
LYMPHOCYTES # BLD: 1.1 K/UL (ref 0.8–3.5)
LYMPHOCYTES NFR BLD: 12 % (ref 12–49)
MAGNESIUM SERPL-MCNC: 2.1 MG/DL (ref 1.6–2.4)
MCH RBC QN AUTO: 26.1 PG (ref 26–34)
MCHC RBC AUTO-ENTMCNC: 33.2 G/DL (ref 30–36.5)
MCV RBC AUTO: 78.8 FL (ref 80–99)
MONOCYTES # BLD: 0.8 K/UL (ref 0–1)
MONOCYTES NFR BLD: 9 % (ref 5–13)
NEUTS SEG # BLD: 7.2 K/UL (ref 1.8–8)
NEUTS SEG NFR BLD: 77 % (ref 32–75)
NRBC # BLD: 0 K/UL (ref 0–0.01)
NRBC BLD-RTO: 0 PER 100 WBC
PLATELET # BLD AUTO: 139 K/UL (ref 150–400)
PLATELET COMMENTS,PCOM: ABNORMAL
PMV BLD AUTO: 10.5 FL (ref 8.9–12.9)
POTASSIUM SERPL-SCNC: 3.4 MMOL/L (ref 3.5–5.1)
PROT SERPL-MCNC: 7.6 G/DL (ref 6.4–8.2)
PROTHROMBIN TIME: 13.1 SEC (ref 11.9–14.6)
RBC # BLD AUTO: 5.47 M/UL (ref 4.1–5.7)
RBC MORPH BLD: ABNORMAL
SODIUM SERPL-SCNC: 131 MMOL/L (ref 136–145)
TROPONIN-HIGH SENSITIVITY: 55 NG/L (ref 0–76)
TROPONIN-HIGH SENSITIVITY: 57 NG/L (ref 0–76)
WBC # BLD AUTO: 9.3 K/UL (ref 4.1–11.1)

## 2023-01-04 PROCEDURE — 80053 COMPREHEN METABOLIC PANEL: CPT

## 2023-01-04 PROCEDURE — 74011250636 HC RX REV CODE- 250/636: Performed by: EMERGENCY MEDICINE

## 2023-01-04 PROCEDURE — 74011000250 HC RX REV CODE- 250: Performed by: EMERGENCY MEDICINE

## 2023-01-04 PROCEDURE — 96374 THER/PROPH/DIAG INJ IV PUSH: CPT

## 2023-01-04 PROCEDURE — 83735 ASSAY OF MAGNESIUM: CPT

## 2023-01-04 PROCEDURE — 74011250637 HC RX REV CODE- 250/637: Performed by: EMERGENCY MEDICINE

## 2023-01-04 PROCEDURE — 85025 COMPLETE CBC W/AUTO DIFF WBC: CPT

## 2023-01-04 PROCEDURE — 85610 PROTHROMBIN TIME: CPT

## 2023-01-04 PROCEDURE — 36415 COLL VENOUS BLD VENIPUNCTURE: CPT

## 2023-01-04 PROCEDURE — 84484 ASSAY OF TROPONIN QUANT: CPT

## 2023-01-04 PROCEDURE — 93005 ELECTROCARDIOGRAM TRACING: CPT

## 2023-01-04 PROCEDURE — 99285 EMERGENCY DEPT VISIT HI MDM: CPT

## 2023-01-04 PROCEDURE — 71045 X-RAY EXAM CHEST 1 VIEW: CPT

## 2023-01-04 PROCEDURE — 83880 ASSAY OF NATRIURETIC PEPTIDE: CPT

## 2023-01-04 RX ORDER — GUAIFENESIN 100 MG/5ML
244 LIQUID (ML) ORAL
Status: COMPLETED | OUTPATIENT
Start: 2023-01-04 | End: 2023-01-04

## 2023-01-04 RX ORDER — FUROSEMIDE 10 MG/ML
20 INJECTION INTRAMUSCULAR; INTRAVENOUS
Status: COMPLETED | OUTPATIENT
Start: 2023-01-04 | End: 2023-01-04

## 2023-01-04 RX ORDER — MAG HYDROX/ALUMINUM HYD/SIMETH 200-200-20
30 SUSPENSION, ORAL (FINAL DOSE FORM) ORAL ONCE
Status: COMPLETED | OUTPATIENT
Start: 2023-01-04 | End: 2023-01-04

## 2023-01-04 RX ORDER — LIDOCAINE HYDROCHLORIDE 20 MG/ML
15 SOLUTION OROPHARYNGEAL
Status: COMPLETED | OUTPATIENT
Start: 2023-01-04 | End: 2023-01-04

## 2023-01-04 RX ADMIN — ASPIRIN 81 MG 243 MG: 81 TABLET ORAL at 08:11

## 2023-01-04 RX ADMIN — FUROSEMIDE 20 MG: 10 INJECTION, SOLUTION INTRAMUSCULAR; INTRAVENOUS at 08:58

## 2023-01-04 RX ADMIN — Medication 15 ML: at 08:55

## 2023-01-04 RX ADMIN — ALUMINUM HYDROXIDE, MAGNESIUM HYDROXIDE, AND SIMETHICONE 30 ML: 200; 200; 20 SUSPENSION ORAL at 08:55

## 2023-01-04 NOTE — ED TRIAGE NOTES
Patient reports left sided chest pain that has been increasing in intensity since 0530 am denies SOB and reports mild pedal swelling   Patient has history of multiple stents and hx of multiple MI's EKG completed in triage

## 2023-01-04 NOTE — Clinical Note
[address]  [facility] EMERGENCY DEPT  [phone]  [phone]    Work/School Note    Date: 1/4/2023    To Whom It May concern:      Darian Hull was seen and treated today in the emergency room by the following provider(s):  Attending Provider: Elizabeth Fairchild MD.      Darian Hull is excused from work/school on 01/04/23. He is clear to return to work/school on 01/05/23.         Sincerely,          Blanca Nichole MD

## 2023-01-04 NOTE — ED PROVIDER NOTES
EMERGENCY DEPARTMENT HISTORY AND PHYSICAL EXAM      Date: 1/4/2023  Patient Name: Dominik Chang    History of Presenting Illness     Chief Complaint   Patient presents with    Chest Pain       History Provided By: Patient and Patient's Wife    HPI: Dominik Chang, 50 y.o. male presenting with 0500 onset of mid to left side chest pain which he states feels like indigestion but states also feels like his first MI. He denies diaphoresis, n/v cough, congestion fevers or chills. No sob. Does feel like he has some swelling of the abdomena and legs. Hx of cad, chf, cki. There are no other complaints, changes, or physical findings at this time.     Past History   Past Medical History:  Past Medical History:   Diagnosis Date    Abdominal hernia 1/29/2020    Benign prostatic hyperplasia 3/16/2021    Chronic kidney disease     congenital polycytic kidneys    Congenital polycystic kidney 3/16/2021    Congestive heart failure ([address] 3/25/2020    Coronary arteriosclerosis 3/25/2020    Diabetes ([address]     Heart attack ([address] 3/18/2020 and 06/30/2020    History of COVID-19 3/28/2021    Hyperlipidemia     Hypertension     Ill-defined condition     BPH, prostatitis    Ill-defined condition     hernia    Ill-defined condition     ischemic cardiomyopathy    Ill-defined condition     (r) knee pain    Inguinal hernia     Morbid obesity ([address]     Prediabetes 3/28/2021    Spinal stenosis        Past Surgical History:  Past Surgical History:   Procedure Laterality Date    HX HERNIA REPAIR  9658    umbiblical hernia    VT UNLISTED PROCEDURE CARDIAC SURGERY  06/30/2020    Stent placement    VT UNLISTED PROCEDURE CARDIAC SURGERY  04/08/2022    stent placement- has total 4       Family History:  Family History   Problem Relation Age of Onset    Hypertension Mother     Diabetes Mother     Kidney Disease Mother     Heart Disease Father     Hypertension Sister     Diabetes Brother     Kidney Disease Brother     No Known Problems Sister        Social History:  Social History     Tobacco Use    Smoking status: Never    Smokeless tobacco: Never   Vaping Use    Vaping Use: Never used   Substance Use Topics    Alcohol use: Not Currently    Drug use: Never       Allergies: Allergies   Allergen Reactions    Adhesive Tape-Silicones Rash     Paper tape causes  large blisters       PCP: Mathew March NP    Current Outpatient Medications   Medication Sig Dispense Refill    hydrALAZINE (APRESOLINE) 10 mg tablet TAKE 2 TABLETS BY MOUTH TWICE DAILY 540 Tablet 0    furosemide (LASIX) 40 mg tablet Take 1 Tablet by mouth two (2) times a day. Accu-Chek Guide test strips strip See Admin Instructions. BD Diane 2nd Gen Pen Needle 32 gauge x 5/32\" ndle USE AS DIRECTED TO INJECT INSULIN      atorvastatin (LIPITOR) 20 mg tablet Take 20 mg by mouth in the morning. potassium chloride (KLOR-CON M10) 10 mEq tablet Take 2 Tablets by mouth three (3) times daily. 540 Tablet 1    flash glucose scanning reader (FreeStyle Ceci 14 Day [place]) misc Use to check glucose 4 times daily 1 Each 0    flash glucose sensor (FreeStyle Ceci 14 Day Sensor) kit Use to check glucose 4 times daily 4 Kit 11    flash glucose scanning reader (FreeStyle Ceci 2 [place]) misc Use reader to check glucose 4 times daily 1 Each 0    flash glucose sensor (FreeStyle Ceic 2 Sensor) kit Use 1 sensor every 14 days 1 Kit 3    furosemide (LASIX) 40 mg tablet Take 2 Tablets by mouth daily. 30 Tablet 0    carvediloL (COREG) 12.5 mg tablet Take 3 Tablets by mouth two (2) times daily (with meals).  37.5mg      aspirin delayed-release 81 mg tablet TAKE 1 TABLET BY MOUTH DAILY      ezetimibe (ZETIA) 10 mg tablet TAKE 1 TABLET BY MOUTH DAILY      isosorbide mononitrate ER (IMDUR) 30 mg tablet TAKE 1 TABLET BY MOUTH DAILY      prasugreL (EFFIENT) 10 mg tablet TAKE 1 TABLET BY MOUTH DAILY      tamsulosin (FLOMAX) 0.4 mg capsule TAKE 1 CAPSULE BY MOUTH DAILY      HYDROcodone-acetaminophen (NORCO) 5-325 mg per tablet Dr Bbeeto Villasenor      nitroglycerin (NITROSTAT) 0.4 mg SL tablet ONE TABLET UNDER TONGUE AS NEEDED FOR CHEST PAIN       Review of Systems   Review of Systems   Constitutional:  Negative for chills, diaphoresis and fever. HENT:  Negative for congestion, sore throat and trouble swallowing. Eyes:  Negative for visual disturbance. Respiratory:  Positive for chest tightness and shortness of breath. Negative for cough. Cardiovascular:  Positive for chest pain. Negative for palpitations and leg swelling. Gastrointestinal:  Negative for abdominal pain, diarrhea, nausea and vomiting. Endocrine: Negative for polydipsia, polyphagia and polyuria. Genitourinary:  Negative for dysuria, frequency, hematuria and urgency. Musculoskeletal:  Negative for gait problem and neck pain. Skin:  Negative for rash. Neurological:  Negative for dizziness, syncope and headaches. All other systems reviewed and are negative. Physical Exam   Physical Exam  Vitals and nursing note reviewed. Constitutional:       Appearance: Normal appearance. HENT:      Head: Normocephalic and atraumatic. Nose: Nose normal.      Mouth/Throat:      Mouth: Mucous membranes are moist.   Eyes:      Pupils: Pupils are equal, round, and reactive to light. Cardiovascular:      Rate and Rhythm: Normal rate and regular rhythm. Pulses: Normal pulses. Heart sounds: Normal heart sounds. Pulmonary:      Effort: Pulmonary effort is normal.      Breath sounds: Normal breath sounds. Chest:      Chest wall: No mass, deformity or tenderness. Abdominal:      Palpations: Abdomen is soft. Musculoskeletal:         General: Normal range of motion. Cervical back: Normal range of motion. Skin:     General: Skin is warm and dry. Capillary Refill: Capillary refill takes less than 2 seconds. Neurological:      General: No focal deficit present. Mental Status: He is alert.    Psychiatric:         Mood and Affect: Mood normal. Behavior: Behavior normal.         Thought Content: Thought content normal.       Lab and Diagnostic Study Results   Labs -     Recent Results (from the past 12 hour(s))   EKG, 12 LEAD, INITIAL    Collection Time: 01/04/23  7:55 AM   Result Value Ref Range    Ventricular Rate 94 BPM    Atrial Rate 94 BPM    P-R Interval 216 ms    QRS Duration 130 ms    Q-T Interval 371 ms    QTC Calculation (Bezet) 464 ms    Calculated P Axis 58 degrees    Calculated R Axis 107 degrees    Calculated T Axis -165 degrees    Diagnosis       Sinus rhythm  Prolonged MI interval  Probable left atrial enlargement  Nonspecific intraventricular conduction delay  Consider anterior infarct  Nonspecific T abnormalities, lateral leads  Baseline wander in lead(s) V1     TROPONIN-HIGH SENSITIVITY    Collection Time: 01/04/23  8:13 AM   Result Value Ref Range    Troponin-High Sensitivity 55 0 - 76 ng/L   PROTHROMBIN TIME + INR    Collection Time: 01/04/23  8:13 AM   Result Value Ref Range    Prothrombin time 13.1 11.9 - 14.6 sec    INR 1.0 0.9 - 1.1     MAGNESIUM    Collection Time: 01/04/23  8:13 AM   Result Value Ref Range    Magnesium 2.1 1.6 - 2.4 mg/dL   METABOLIC PANEL, COMPREHENSIVE    Collection Time: 01/04/23  8:13 AM   Result Value Ref Range    Sodium 131 (L) 136 - 145 mmol/L    Potassium 3.4 (L) 3.5 - 5.1 mmol/L    Chloride 94 (L) 97 - 108 mmol/L    CO2 27 21 - 32 mmol/L    Anion gap 10 5 - 15 mmol/L    Glucose 520 (H) 65 - 100 mg/dL    BUN 37 (H) 6 - 20 mg/dL    Creatinine 4.11 (H) 0.70 - 1.30 mg/dL    BUN/Creatinine ratio 9 (L) 12 - 20      eGFR 17 (L) >60 ml/min/1.73m2    Calcium 8.5 8.5 - 10.1 mg/dL    Bilirubin, total 0.7 0.2 - 1.0 mg/dL    AST (SGOT) 27 15 - 37 U/L    ALT (SGPT) 29 12 - 78 U/L    Alk.  phosphatase 94 45 - 117 U/L    Protein, total 7.6 6.4 - 8.2 g/dL    Albumin 2.8 (L) 3.5 - 5.0 g/dL    Globulin 4.8 (H) 2.0 - 4.0 g/dL    A-G Ratio 0.6 (L) 1.1 - 2.2     CBC WITH AUTOMATED DIFF    Collection Time: 01/04/23  8:13 AM Result Value Ref Range    WBC 9.3 4.1 - 11.1 K/uL    RBC 5.47 4.10 - 5.70 M/uL    HGB 14.3 12.1 - 17.0 g/dL    HCT 43.1 36.6 - 50.3 %    MCV 78.8 (L) 80.0 - 99.0 FL    MCH 26.1 26.0 - 34.0 PG    MCHC 33.2 30.0 - 36.5 g/dL    RDW 13.3 11.5 - 14.5 %    PLATELET 018 (L) 923 - 400 K/uL    MPV 10.5 8.9 - 12.9 FL    NRBC 0.0 0.0  WBC    ABSOLUTE NRBC 0.00 0.00 - 0.01 K/uL    NEUTROPHILS 77 (H) 32 - 75 %    LYMPHOCYTES 12 12 - 49 %    MONOCYTES 9 5 - 13 %    EOSINOPHILS 2 0 - 7 %    BASOPHILS 0 0 - 1 %    IMMATURE GRANULOCYTES 0 0 - 0.5 %    ABS. NEUTROPHILS 7.2 1.8 - 8.0 K/UL    ABS. LYMPHOCYTES 1.1 0.8 - 3.5 K/UL    ABS. MONOCYTES 0.8 0.0 - 1.0 K/UL    ABS. EOSINOPHILS 0.2 0.0 - 0.4 K/UL    ABS. BASOPHILS 0.0 0.0 - 0.1 K/UL    ABS. IMM. GRANS. 0.0 0.00 - 0.04 K/UL    DF AUTOMATED      PLATELET COMMENTS Clumped Platelets      RBC COMMENTS Normocytic, Normochromic     NT-PRO BNP    Collection Time: 01/04/23  8:13 AM   Result Value Ref Range    NT pro-BNP 5,074 (H) <125 pg/mL   TROPONIN-HIGH SENSITIVITY    Collection Time: 01/04/23  9:49 AM   Result Value Ref Range    Troponin-High Sensitivity 57 0 - 76 ng/L       Radiologic Studies -        CXR Results  (Last 48 hours)                 01/04/23 0826  XR CHEST PORT Final result    Impression:  Very mild right mid lung atelectasis. Narrative:  INDICATION: Chest pain. Portable AP view of the chest.       Direct comparison made to prior chest x-ray dated 4/2022. Cardiomediastinal silhouette is stable. There is very mild right mid lung   atelectasis. Left lung is clear. Pleural spaces are normal and there is no   pneumothorax. Osseous structures are intact. Medical Decision Making and ED Course   Differential Diagnosis & Medical Decision Making Provider Note:   Patient presents with CP.   While the spectrum of DDx includes ACS, Aortic dissection, PNA, PE, PTX, pericarditis, myocarditis, GERD, costochondritis, anxiety, most concerned for ACS given the HPI and Physical exam.  The others are less likely. Will obtain labs, CXR, EKG and get Cardiology Consult PRN. Plan for admission. DDx includes STEMI, NSTEMI, Angina, PE, Aortic Pathology, Chest Wall Pain, Pleurisy, Pneumonia, GERD/esophagitis, Anxiety. No cough/fever or focal lung findings to suggest pneumonia. No tachycardia, hypoxia or pleuritic component to suggest PE. Pulses symmetric and no extremely elevated BP/asymmetry or classic tearing sensation to suggest Aortic Dissection. Also, no neuro findings. No wretching/forceful vomiting to suggest esophageal disaster. Denies IV drug abuse, has native valves, no fevers/murmurs or skin lesions to suggest endocarditis. Will evaluate with EKG, labs, cardiac enzymes, chest x-ray. Will provide pain control and reassess. HEART SCORE:    History    Highly suspicious +2    EKG    Non-Specific disturbance +1    Age:    44-72 = +1    Risk factors:    DM, Hyperlipidemia, HTN, Obesity, CAD, HIV, Smoking, Cocaine, Family History,    3+ risk factors +2    Troponin:    Normal 0    HEART SCORE TOTAL = 6    Management   Scores 0-3: 0.9-1.7% risk of adverse cardiac event. In the HEART Score study, these patients were discharged (0.99% in the retrospective study, 1.7% in the prospective study)   Scores 4-6: 12-16.6% risk of adverse cardiac event. In the HEART Score study, these patients were admitted to the hospital. (11.6% retrospective, 16.6% prospective)   Scores ? 7: 50-65% risk of adverse cardiac event. In the HEART Score study, these patients were candidates for early invasive measures. (65.2% retrospective, 50.1% prospective)   A MACE (Major Adverse Cardiac Event) was defined as all-cause mortality, myocardial infarction, or coronary revascularization. Original/Primary Reference  Six AJ, Seble BE, Esa KAYCE. Chest pain in the emergency room: value of the HEART score. Neth Heart J. 2008;16(6):191-6. Validation  Everette Erazo, Esa DEVRIES, et al. A prospective validation of the HEART score for chest pain patients at the emergency department. Int J Cardiol. 2013;168(3):2153-8. Everette Erazo, Gina Lee et al. Chest pain in the emergency room: a multicenter validation of the HEART Score. Crit Pathw Cardiol. 2010;9(3):164-9. Vidya ALEXUS, Man RF, Willie BC, et al. The HEART Pathway Randomized Controlled Trial One Year Outcomes. [facility] 2018; Medical Decision Making  Amount and/or Complexity of Data Reviewed  Independent Historian: zachary  External Data Reviewed: labs, radiology and ECG. Labs: ordered. Details: Elevated BNP. normal cardiac troponin x 2.  Radiology: ordered. Details: clear cxr. ECG/medicine tests: ordered. Details: no stemi. no ectopy nonspecific t wave abnormalities. Risk  OTC drugs. Prescription drug management. Decision regarding hospitalization.       - I am the first and primary provider for this patient. I reviewed the vital signs, available nursing notes, past medical history, past surgical history, family history and social history. The patient's presenting problems have been discussed, and the staff are in agreement with the care plan formulated and outlined with them. I have encouraged them to ask questions as they arise throughout their visit. Vital Signs-Reviewed the patient's vital signs. Patient Vitals for the past 12 hrs:   Temp Pulse Resp BP SpO2   01/04/23 0941 -- 87 -- (!) 129/95 98 %   01/04/23 0858 -- 84 -- (!) 132/97 --   01/04/23 0843 -- -- -- (!) 131/94 --   01/04/23 0823 -- -- -- -- 99 %   01/04/23 0821 -- 80 17 -- 99 %   01/04/23 0808 98.1 °F (36.7 °C) 98 18 (!) 134/95 100 %       EKG interpretation: (Preliminary): EKG Interpreted by me. Shows no STEMI, no ectopy rate of 94 bpm.  Probable left atrial enlargement. T wave inversions anteriorly.   Nonspecific ST depressions      ED Course:   ED Course as of 01/04/23 2030 [facility] Jan 04, 2023   0904 Discussed resutls of testign, elevated BNP, elevated risk for CAD given HEART score and recommendation for admission. Will repeat troponin and likely admit here. [MC]      ED Course User Index  [MC] Jamey Bautista MD     10:43 AM, pt stating he does not wish to stay in the hospital. Shared decision making with patient. He is not willing to stay despite discusison that he is high risk. At this time will DC home AMA but he understands reasons to return immediately to the ed for any concerns or deterioratiosn. States he has appointment on Friday with his cardiologist.        Disposition   Disposition: Condition stable  DC- Adult Discharges: All of the diagnostic tests were reviewed and questions answered. Diagnosis, care plan and treatment options were discussed. The patient understands the instructions and will follow up as directed. The patients results have been reviewed with them. They have been counseled regarding their diagnosis. The patient verbally convey understanding and agreement of the signs, symptoms, diagnosis, treatment and prognosis and additionally agrees to follow up as recommended with their PCP in 24 - 48 hours. They also agree with the care-plan and convey that all of their questions have been answered. I have also put together some discharge instructions for them that include: 1) educational information regarding their diagnosis, 2) how to care for their diagnosis at home, as well a 3) list of reasons why they would want to return to the ED prior to their follow-up appointment, should their condition change. DC-The patient was given verbal chest pain warning signs and, dehydration, and follow-up instructions  DC- Pain Control DC Home plan: Analgesics and Referral Family Medicine/PCP and Cardiology    Pt was discharged against medical advise but understands to return should he have any worsening symptoms or concerns.  SO at bedside also expresses understanding. DISCHARGE PLAN:  1. Current Discharge Medication List        CONTINUE these medications which have NOT CHANGED    Details   hydrALAZINE (APRESOLINE) 10 mg tablet TAKE 2 TABLETS BY MOUTH TWICE DAILY  Qty: 540 Tablet, Refills: 0    Associated Diagnoses: Essential hypertension      !! furosemide (LASIX) 40 mg tablet Take 1 Tablet by mouth two (2) times a day. Accu-Chek Guide test strips strip See Admin Instructions. BD Diane 2nd Gen Pen Needle 32 gauge x 5/32\" ndle USE AS DIRECTED TO INJECT INSULIN      atorvastatin (LIPITOR) 20 mg tablet Take 20 mg by mouth in the morning. potassium chloride (KLOR-CON M10) 10 mEq tablet Take 2 Tablets by mouth three (3) times daily. Qty: 540 Tablet, Refills: 1    Associated Diagnoses: Anemia, unspecified type      !! flash glucose scanning reader (FreeStyle Ceci 14 Day [place]) misc Use to check glucose 4 times daily  Qty: 1 Each, Refills: 0    Comments: [id number]  Associated Diagnoses: Insulin dependent type 2 diabetes mellitus ([address]      ! ! flash glucose sensor (FreeStyle Ecci 14 Day Sensor) kit Use to check glucose 4 times daily  Qty: 4 Kit, Refills: 11    Comments: [id number]  Associated Diagnoses: Insulin dependent type 2 diabetes mellitus ([address]      ! ! flash glucose scanning reader (FreeStyle Ceci 2 [place]) misc Use reader to check glucose 4 times daily  Qty: 1 Each, Refills: 0    Comments: [id number]  Associated Diagnoses: Type 2 diabetes mellitus with hyperglycemia, with long-term current use of insulin ([address]      ! ! flash glucose sensor (FreeStyle Ceci 2 Sensor) kit Use 1 sensor every 14 days  Qty: 1 Kit, Refills: 3    Comments: [id number]  Associated Diagnoses: Type 2 diabetes mellitus with hyperglycemia, with long-term current use of insulin ([address]      ! ! furosemide (LASIX) 40 mg tablet Take 2 Tablets by mouth daily.   Qty: 30 Tablet, Refills: 0      carvediloL (COREG) 12.5 mg tablet Take 3 Tablets by mouth two (2) times daily (with meals). 37.5mg      aspirin delayed-release 81 mg tablet TAKE 1 TABLET BY MOUTH DAILY      ezetimibe (ZETIA) 10 mg tablet TAKE 1 TABLET BY MOUTH DAILY      isosorbide mononitrate ER (IMDUR) 30 mg tablet TAKE 1 TABLET BY MOUTH DAILY      prasugreL (EFFIENT) 10 mg tablet TAKE 1 TABLET BY MOUTH DAILY      tamsulosin (FLOMAX) 0.4 mg capsule TAKE 1 CAPSULE BY MOUTH DAILY      HYDROcodone-acetaminophen (NORCO) 5-325 mg per tablet Dr Jerome Ramsay      nitroglycerin (NITROSTAT) 0.4 mg SL tablet ONE TABLET UNDER TONGUE AS NEEDED FOR CHEST PAIN       !! - Potential duplicate medications found. Please discuss with provider. 2.   Follow-up Information       Follow up With Specialties Details Why Contact Info        Please return to this Emergency Department if symptoms worsen as we discussed. Gladis Schulte, DEANNA [facility] Nurse Practitioner   [address] [facility] [address] [phone]      [facility] EMERGENCY DEPT Emergency Medicine Go to  As needed, or for any concerns or deteriorations. , if symptoms persist or worsen. [address] [place] [address]  [phone]          3. Return to ED if worse   4. Current Discharge Medication List           Diagnosis/Clinical Impression     Clinical Impression:   1. Other chest pain Active   2. Volume overload state of heart        Attestations: I, Annamaria Du MD, am the primary clinician of record. Please note that this dictation was completed with [employer], the [employer] voice recognition software. Quite often unanticipated grammatical, syntax, homophones, and other interpretive errors are inadvertently transcribed by the computer software. Please disregard these errors. Please excuse any errors that have escaped final proofreading. Thank you.

## 2023-01-05 LAB
ATRIAL RATE: 94 BPM
CALCULATED P AXIS, ECG09: 58 DEGREES
CALCULATED R AXIS, ECG10: 107 DEGREES
CALCULATED T AXIS, ECG11: -165 DEGREES
DIAGNOSIS, 93000: NORMAL
P-R INTERVAL, ECG05: 216 MS
Q-T INTERVAL, ECG07: 371 MS
QRS DURATION, ECG06: 130 MS
QTC CALCULATION (BEZET), ECG08: 464 MS
VENTRICULAR RATE, ECG03: 94 BPM

## 2023-01-17 DIAGNOSIS — J01.90 ACUTE SINUSITIS, RECURRENCE NOT SPECIFIED, UNSPECIFIED LOCATION: Primary | ICD-10-CM

## 2023-01-17 RX ORDER — AMOXICILLIN AND CLAVULANATE POTASSIUM 875; 125 MG/1; MG/1
1 TABLET, FILM COATED ORAL EVERY 12 HOURS
Qty: 20 TABLET | Refills: 0 | Status: SHIPPED | OUTPATIENT
Start: 2023-01-17 | End: 2023-01-27

## 2023-02-02 PROBLEM — E11.65 TYPE 2 DIABETES MELLITUS WITH HYPERGLYCEMIA, WITHOUT LONG-TERM CURRENT USE OF INSULIN (HCC): Status: ACTIVE | Noted: 2023-02-02

## 2023-02-06 ENCOUNTER — TELEPHONE (OUTPATIENT)
Dept: PRIMARY CARE CLINIC | Age: 53
End: 2023-02-06

## 2023-02-06 ENCOUNTER — OFFICE VISIT (OUTPATIENT)
Dept: PRIMARY CARE CLINIC | Age: 53
End: 2023-02-06
Payer: COMMERCIAL

## 2023-02-06 VITALS
DIASTOLIC BLOOD PRESSURE: 71 MMHG | HEIGHT: 74 IN | BODY MASS INDEX: 32.21 KG/M2 | OXYGEN SATURATION: 91 % | TEMPERATURE: 97.8 F | SYSTOLIC BLOOD PRESSURE: 124 MMHG | HEART RATE: 89 BPM | RESPIRATION RATE: 18 BRPM | WEIGHT: 251 LBS

## 2023-02-06 DIAGNOSIS — Z91.14 NONCOMPLIANCE WITH MEDICATIONS: ICD-10-CM

## 2023-02-06 DIAGNOSIS — N40.0 BENIGN PROSTATIC HYPERPLASIA WITHOUT LOWER URINARY TRACT SYMPTOMS: ICD-10-CM

## 2023-02-06 DIAGNOSIS — I50.22 CHRONIC SYSTOLIC CONGESTIVE HEART FAILURE (HCC): ICD-10-CM

## 2023-02-06 DIAGNOSIS — E11.65 TYPE 2 DIABETES MELLITUS WITH HYPERGLYCEMIA, WITHOUT LONG-TERM CURRENT USE OF INSULIN (HCC): ICD-10-CM

## 2023-02-06 DIAGNOSIS — D64.9 ANEMIA, UNSPECIFIED TYPE: ICD-10-CM

## 2023-02-06 DIAGNOSIS — E66.09 CLASS 1 OBESITY DUE TO EXCESS CALORIES WITH SERIOUS COMORBIDITY AND BODY MASS INDEX (BMI) OF 31.0 TO 31.9 IN ADULT: ICD-10-CM

## 2023-02-06 DIAGNOSIS — I25.10 CAD, MULTIPLE VESSEL: ICD-10-CM

## 2023-02-06 DIAGNOSIS — N18.4 CKD (CHRONIC KIDNEY DISEASE) STAGE 4, GFR 15-29 ML/MIN (HCC): ICD-10-CM

## 2023-02-06 DIAGNOSIS — I10 ESSENTIAL HYPERTENSION: ICD-10-CM

## 2023-02-06 DIAGNOSIS — M48.00 SPINAL STENOSIS, UNSPECIFIED SPINAL REGION: ICD-10-CM

## 2023-02-06 DIAGNOSIS — K40.90 LEFT INGUINAL HERNIA: ICD-10-CM

## 2023-02-06 DIAGNOSIS — E78.2 MIXED HYPERLIPIDEMIA: Primary | ICD-10-CM

## 2023-02-06 DIAGNOSIS — E27.40 ALDOSTERONE DEFICIENCY (HCC): ICD-10-CM

## 2023-02-06 PROBLEM — Z91.148 NONCOMPLIANCE WITH MEDICATIONS: Status: ACTIVE | Noted: 2023-02-06

## 2023-02-06 LAB — HBA1C MFR BLD HPLC: 14 %

## 2023-02-06 PROCEDURE — 99214 OFFICE O/P EST MOD 30 MIN: CPT | Performed by: NURSE PRACTITIONER

## 2023-02-06 PROCEDURE — 83036 HEMOGLOBIN GLYCOSYLATED A1C: CPT | Performed by: NURSE PRACTITIONER

## 2023-02-06 PROCEDURE — 3046F HEMOGLOBIN A1C LEVEL >9.0%: CPT | Performed by: NURSE PRACTITIONER

## 2023-02-06 PROCEDURE — 3078F DIAST BP <80 MM HG: CPT | Performed by: NURSE PRACTITIONER

## 2023-02-06 PROCEDURE — 3074F SYST BP LT 130 MM HG: CPT | Performed by: NURSE PRACTITIONER

## 2023-02-06 RX ORDER — CARVEDILOL 12.5 MG/1
TABLET ORAL 2 TIMES DAILY WITH MEALS
COMMUNITY

## 2023-02-06 RX ORDER — FUROSEMIDE 20 MG/1
20 TABLET ORAL DAILY
COMMUNITY
Start: 2023-01-18 | End: 2023-02-06 | Stop reason: ALTCHOICE

## 2023-02-06 RX ORDER — HYDRALAZINE HYDROCHLORIDE 10 MG/1
20 TABLET, FILM COATED ORAL 3 TIMES DAILY
COMMUNITY

## 2023-02-06 RX ORDER — POTASSIUM CHLORIDE 750 MG/1
TABLET, FILM COATED, EXTENDED RELEASE ORAL
COMMUNITY
Start: 2023-01-31

## 2023-02-06 RX ORDER — INSULIN GLARGINE 100 [IU]/ML
30 INJECTION, SOLUTION SUBCUTANEOUS DAILY
COMMUNITY

## 2023-02-06 RX ORDER — ERGOCALCIFEROL 1.25 MG/1
CAPSULE ORAL
COMMUNITY
Start: 2022-11-14

## 2023-02-06 RX ORDER — TAMSULOSIN HYDROCHLORIDE 0.4 MG/1
0.4 CAPSULE ORAL DAILY
Qty: 90 CAPSULE | Refills: 1 | Status: SHIPPED | OUTPATIENT
Start: 2023-02-06

## 2023-02-06 RX ORDER — EZETIMIBE 10 MG/1
10 TABLET ORAL DAILY
Qty: 90 TABLET | Refills: 1 | Status: SHIPPED | OUTPATIENT
Start: 2023-02-06

## 2023-02-06 RX ORDER — ISOSORBIDE MONONITRATE 60 MG/1
60 TABLET, EXTENDED RELEASE ORAL DAILY
COMMUNITY
Start: 2023-01-09

## 2023-02-06 RX ORDER — TORSEMIDE 20 MG/1
TABLET ORAL
COMMUNITY
Start: 2023-01-31

## 2023-02-06 RX ORDER — HYDRALAZINE HYDROCHLORIDE 25 MG/1
25 TABLET, FILM COATED ORAL 3 TIMES DAILY
COMMUNITY
Start: 2022-11-22 | End: 2023-02-06 | Stop reason: ALTCHOICE

## 2023-02-06 RX ORDER — CARVEDILOL 25 MG/1
25 TABLET ORAL 2 TIMES DAILY
COMMUNITY
Start: 2023-01-18

## 2023-02-06 RX ORDER — INSULIN ASPART 100 [IU]/ML
2 INJECTION, SOLUTION INTRAVENOUS; SUBCUTANEOUS DAILY
COMMUNITY
Start: 2022-11-18

## 2023-02-06 NOTE — PROGRESS NOTES
Chief Complaint   Patient presents with    Diabetes     Follow up    1. \"Have you been to the ER, urgent care clinic since your last visit? Hospitalized since your last visit? \" No    2. \"Have you seen or consulted any other health care providers outside of the [address] [place] [address] since your last visit? \" No     3. For patients aged 39-70: Has the patient had a colonoscopy / FIT/ Cologuard? No never had one      If the patient is female:    4. For patients aged 41-77: Has the patient had a mammogram within the past 2 years? NA - based on age or sex      11. For patients aged 21-65: Has the patient had a pap smear?  NA - based on age or sex

## 2023-02-06 NOTE — PROGRESS NOTES
Kirt Combs is a 50 y.o. male who presents to the office today for the following:    Chief Complaint   Patient presents with    Diabetes       Past Medical History:   Diagnosis Date    Abdominal hernia 1/29/2020    Benign prostatic hyperplasia 3/16/2021    Chronic kidney disease     congenital polycytic kidneys    Congenital polycystic kidney 3/16/2021    Congestive heart failure ([address] 3/25/2020    Coronary arteriosclerosis 3/25/2020    Diabetes ([address]     Heart attack ([address] 3/18/2020 and 06/30/2020    History of COVID-19 3/28/2021    Hyperlipidemia     Hypertension     Ill-defined condition     BPH, prostatitis    Ill-defined condition     hernia    Ill-defined condition     ischemic cardiomyopathy    Ill-defined condition     (r) knee pain    Inguinal hernia     Morbid obesity ([address]     Prediabetes 3/28/2021    Spinal stenosis     Type 2 diabetes mellitus with hyperglycemia, without long-term current use of insulin ([address] 2/2/2023       Past Surgical History:   Procedure Laterality Date    HX HERNIA REPAIR  7015    umbiblical hernia    WI UNLISTED PROCEDURE CARDIAC SURGERY  06/30/2020    Stent placement    WI UNLISTED PROCEDURE CARDIAC SURGERY  04/08/2022    stent placement- has total 4        Family History   Problem Relation Age of Onset    Hypertension Mother     Diabetes Mother     Kidney Disease Mother     Heart Disease Father     Hypertension Sister     Diabetes Brother     Kidney Disease Brother     No Known Problems Sister         Social History     Tobacco Use    Smoking status: Never    Smokeless tobacco: Never   Vaping Use    Vaping Use: Never used   Substance Use Topics    Alcohol use: Not Currently    Drug use: Never      HPI  Patient  with PMH of CAD w/ stents, hyperlipidemia, hypertension, CHF, BPH, prediabetes, CKD, covid 19,chronic back pain, hernia and obesity who is here for follow up of chronic conditions. States that he is taking medications as directed. Follows with cardiology and nephrology. Has not seen the endocrinologist still due to wife reports they are on waiting list.  Did not bring meter but patient reports fasting glucose usually around 200 with some slightly lower. Patient states that he is taking the levemir and novolog sliding scale but misses his lunch time insulin at times. Does report he has reduced bread and carbs in diet. Also drinking water and no sodas or sugary beverages. Current Outpatient Medications on File Prior to Visit   Medication Sig    ergocalciferol (ERGOCALCIFEROL) 1,250 mcg (50,000 unit) capsule TAKE 1 CAPSULE BY MOUTH 1 TIME EVERY WEEK    NovoLOG FlexPen U-100 Insulin 100 unit/mL (3 mL) inpn 30 Units daily. torsemide (DEMADEX) 20 mg tablet TAKE 2 TABLETS BY MOUTH 2 TIMES DAILY    carvediloL (COREG) 25 mg tablet Take 25 mg by mouth two (2) times a day. hydrALAZINE (APRESOLINE) 25 mg tablet Take 25 mg by mouth three (3) times daily. isosorbide mononitrate ER (IMDUR) 60 mg CR tablet Take 60 mg by mouth daily. potassium chloride SR (KLOR-CON 10) 10 mEq tablet TAKE 4 TABLET BY MOUTH TWICE DAILY    carvediloL (COREG) 12.5 mg tablet Take  by mouth two (2) times daily (with meals). insulin glargine (LANTUS,BASAGLAR) 100 unit/mL (3 mL) inpn 50 Units by SubCUTAneous route daily. atorvastatin (LIPITOR) 20 mg tablet Take 20 mg by mouth in the morning. prasugreL (EFFIENT) 10 mg tablet TAKE 1 TABLET BY MOUTH DAILY    HYDROcodone-acetaminophen (NORCO) 5-325 mg per tablet Dr Chikis Duran    nitroglycerin (NITROSTAT) 0.4 mg SL tablet ONE TABLET UNDER TONGUE AS NEEDED FOR CHEST PAIN    [DISCONTINUED] furosemide (LASIX) 20 mg tablet Take 20 mg by mouth daily. [DISCONTINUED] hydrALAZINE (APRESOLINE) 10 mg tablet TAKE 2 TABLETS BY MOUTH TWICE DAILY    [DISCONTINUED] furosemide (LASIX) 40 mg tablet Take 1 Tablet by mouth two (2) times a day. Accu-Chek Guide test strips strip See Admin Instructions.       BD Diane 2nd Gen Pen Needle 32 gauge x 5/32\" ndle USE AS DIRECTED TO INJECT INSULIN    [DISCONTINUED] potassium chloride (KLOR-CON M10) 10 mEq tablet Take 2 Tablets by mouth three (3) times daily. [DISCONTINUED] flash glucose scanning reader (FreeStyle Ceci 14 Day [place]) misc Use to check glucose 4 times daily (Patient not taking: Reported on 2/6/2023)    [DISCONTINUED] flash glucose sensor (FreeStyle Ceci 14 Day Sensor) kit Use to check glucose 4 times daily (Patient not taking: Reported on 2/6/2023)    [DISCONTINUED] flash glucose scanning reader (FreeStyle Ceci 2 [place]) misc Use reader to check glucose 4 times daily (Patient not taking: Reported on 2/6/2023)    [DISCONTINUED] flash glucose sensor (FreeStyle Ceci 2 Sensor) kit Use 1 sensor every 14 days    [DISCONTINUED] furosemide (LASIX) 40 mg tablet Take 2 Tablets by mouth daily. [DISCONTINUED] carvediloL (COREG) 12.5 mg tablet Take 3 Tablets by mouth two (2) times daily (with meals). 37.5mg    aspirin delayed-release 81 mg tablet TAKE 1 TABLET BY MOUTH DAILY    [DISCONTINUED] ezetimibe (ZETIA) 10 mg tablet TAKE 1 TABLET BY MOUTH DAILY    [DISCONTINUED] isosorbide mononitrate ER (IMDUR) 30 mg tablet TAKE 1 TABLET BY MOUTH DAILY    [DISCONTINUED] tamsulosin (FLOMAX) 0.4 mg capsule TAKE 1 CAPSULE BY MOUTH DAILY     No current facility-administered medications on file prior to visit. Medications Ordered Today   Medications    tamsulosin (FLOMAX) 0.4 mg capsule     Sig: Take 1 Capsule by mouth daily. Dispense:  90 Capsule     Refill:  1    ezetimibe (ZETIA) 10 mg tablet     Sig: Take 1 Tablet by mouth daily. Dispense:  90 Tablet     Refill:  1        Review of Systems   Constitutional: Negative. HENT:  Negative for ear pain and sore throat. Eyes: Negative. Respiratory:  Positive for shortness of breath. Negative for hemoptysis, wheezing and stridor. Cardiovascular:  Positive for leg swelling. Negative for chest pain, palpitations, claudication and PND. Gastrointestinal: Negative. Genitourinary: Negative. Negative for dysuria, frequency and urgency. Musculoskeletal:  Positive for back pain and myalgias. Negative for falls. Neurological: Negative. Psychiatric/Behavioral: Negative. Visit Vitals  /71 (BP 1 Location: Left upper arm, BP Patient Position: Sitting, BP Cuff Size: Large adult)   Pulse 89   Temp 97.8 °F (36.6 °C) (Temporal)   Resp 18   Ht 6' 2\" (1.88 m)   Wt 251 lb (113.9 kg)   SpO2 91%   BMI 32.23 kg/m²       Physical Exam  Vitals and nursing note reviewed. Constitutional:       Appearance: Normal appearance. He is obese. Eyes:      Pupils: Pupils are equal, round, and reactive to light. Neck:      Vascular: No carotid bruit. Cardiovascular:      Rate and Rhythm: Normal rate and regular rhythm. Pulses: Normal pulses. Pulmonary:      Effort: Pulmonary effort is normal.      Breath sounds: Normal breath sounds. Abdominal:      General: Bowel sounds are normal.      Palpations: Abdomen is soft. Tenderness: There is no abdominal tenderness. Hernia: A hernia is present. Musculoskeletal:         General: Normal range of motion. Right lower leg: Edema (trace) present. Left lower leg: Edema (trace) present. Lymphadenopathy:      Cervical: No cervical adenopathy. Skin:     General: Skin is warm and dry. Neurological:      Mental Status: He is alert and oriented to person, place, and time. Mental status is at baseline. Psychiatric:         Mood and Affect: Mood normal.         Behavior: Behavior normal.          1. Chronic systolic congestive heart failure ([address]   Echo at [facility] on 6/16/22 showed EF 10-15% with severe global hypokinesis and dilated LV.   Echo [facility] 12/20/22 showed EF 20-25% with severe reduction in systolic function  On torsemide 40mg twice daily instead of lasix per last nephrology noted  Had previously declined transition to entresto and bumex  Had been seeing [facility] but recently seen by [facility] cardiology (Dr. Isaiah Agee Letty Gao) and notes reviewed  Had declined  placement of AICD  Will continue care with cardiology    2. CAD, multiple vessel  Patient has h/o MI and 3 stents with last 2 placed in 6/2020  Remains on effient and asa  Was following with [facility] but now following again with Dr. Pedro Hebert at [address] cardiology and will continue care with them    3. Essential hypertension  Blood pressure is essentially controlled and continue carvedilol, torsemide, hydralazine isosorbide as directed  Monitor at home and notify provider if > 140/90    4. Benign prostatic hyperplasia without lower urinary tract symptoms  No current symptoms and is on flomax    5. Type 2 diabetes mellitus with hyperglycemia, without long-term current use of insulin ([facility])  Lab Results   Component Value Date/Time    Hemoglobin A1c 13.8 (H) 04/09/2022 02:58 AM    Hemoglobin A1c (POC) 14.0 02/06/2023 09:33 AM     A1c remains uncontrolled at 14.0  Did not tolerate farxiga or Jardiance previously  Continues to report home readings are around 200 fasting but did not bring meter  No polyuria or polydipsia sx  Reports he is still  trying to work on reduce carbs in diet and exercise more regularly  Misses doses of insulin due to work schedule    Continue  lantus to 50 units nightly  Encourage compliance with Novolog prior to meals. Advised to take 10 units + correction sliding scale (only taking the sliding scale _  Check sugars 2-3 times daily and bring meter to next appointment  Attempted to order freestyle sae to send to [facility] but not covered  Continue to  on  dietary changes that I feel will be beneficial as well as encourage regular exercise 3-5 times weekly as tolerated  Encourage diabetic eye exams and wife reports they will schedule on their own at [facility] to send another referral to endocrinology as reports they are on waiting list with closest. She will let us know if any difficulty getting scheduled.     6. Spinal stenosis, unspecified spinal region  Symptoms reported as stable and is on chronic pain medication  Follows with pain specialist in [place]    7. Class 1 obesity due to excess calories with serious comorbidity and body mass index (BMI) of 31.0 to 31.9 in adult  Continue to encourage weight loss with diet and exercise as discussed above    8. Mixed hyperlipidemia  Continues on atorvastatin 40mg as directed (highest dose tolerated). 9. Left inguinal hernia  Has discomfort at site still and has to manually reduce  Was referred to Dr. Jorge Angulo who has recommended repair but  Will have to have cardiac clearance for any surgical intervention    10. Stage 4 chronic kidney disease (HCC)  Lab Results   Component Value Date/Time    Creatinine 4.11 (H) 01/04/2023 08:13 AM   Is under care of nephrology and reviewed last note  Was encouraged to get good control of diabetes to prevent progression  No use of NSAIDS, IV dye or other nephrotoxins    11. Aldosterone deficiency ([address]  Was recommended by endocrinology at [facility] to have further work up for potential aldosterone insufficiency and referral sent again    12. Anemia  Lab Results   Component Value Date/Time    WBC 9.3 01/04/2023 08:13 AM    HGB 14.3 01/04/2023 08:13 AM    HCT 43.1 01/04/2023 08:13 AM    PLATELET 270 (L) 33/08/5207 08:13 AM    MCV 78.8 (L) 01/04/2023 08:13 AM     Lab Results   Component Value Date/Time    Ferritin 41 11/16/2021 04:14 PM   Stable   Colon screening was declined and is aware of risks which could lead to poor outcomes or be life threatening. 15. Noncompliance with diabetes treatment  Re-iterated importance in strict management of diabetes with fingerstick checks, diet and insulin to prevent serious or life threatening effects from diabetes. Patient/wife verbalizes understanding of plan of care as discussed above    Follow-up and Dispositions    Return in about 6 weeks (around 3/20/2023) for or sooner for worsening symptoms.

## 2023-02-07 LAB
ALBUMIN SERPL-MCNC: 3.6 G/DL (ref 4–5)
ALBUMIN/CREAT UR: 2489 MG/G CREAT (ref 0–29)
ALBUMIN/GLOB SERPL: 1.1 {RATIO} (ref 1.2–2.2)
ALP SERPL-CCNC: 73 IU/L (ref 44–121)
ALT SERPL-CCNC: 13 IU/L (ref 0–44)
APPEARANCE UR: CLEAR
AST SERPL-CCNC: 17 IU/L (ref 0–40)
BACTERIA #/AREA URNS HPF: NORMAL /[HPF]
BASOPHILS # BLD AUTO: 0.1 X10E3/UL (ref 0–0.2)
BASOPHILS NFR BLD AUTO: 0 %
BILIRUB SERPL-MCNC: 0.9 MG/DL (ref 0–1.2)
BILIRUB UR QL STRIP: NEGATIVE
BUN SERPL-MCNC: 45 MG/DL (ref 6–24)
BUN/CREAT SERPL: 12 (ref 9–20)
CALCIUM SERPL-MCNC: 8.8 MG/DL (ref 8.7–10.2)
CASTS URNS QL MICRO: NORMAL /LPF
CHLORIDE SERPL-SCNC: 92 MMOL/L (ref 96–106)
CHOLEST SERPL-MCNC: 214 MG/DL (ref 100–199)
CO2 SERPL-SCNC: 26 MMOL/L (ref 20–29)
COLOR UR: YELLOW
CREAT SERPL-MCNC: 3.76 MG/DL (ref 0.76–1.27)
CREAT UR-MCNC: 42.1 MG/DL
EGFRCR SERPLBLD CKD-EPI 2021: 19 ML/MIN/1.73
EOSINOPHIL # BLD AUTO: 0.1 X10E3/UL (ref 0–0.4)
EOSINOPHIL NFR BLD AUTO: 1 %
EPI CELLS #/AREA URNS HPF: NORMAL /HPF (ref 0–10)
ERYTHROCYTE [DISTWIDTH] IN BLOOD BY AUTOMATED COUNT: 13.3 % (ref 11.6–15.4)
GLOBULIN SER CALC-MCNC: 3.3 G/DL (ref 1.5–4.5)
GLUCOSE SERPL-MCNC: 384 MG/DL (ref 70–99)
GLUCOSE UR QL STRIP: ABNORMAL
HCT VFR BLD AUTO: 44.2 % (ref 37.5–51)
HDLC SERPL-MCNC: 51 MG/DL
HGB BLD-MCNC: 14.1 G/DL (ref 13–17.7)
HGB UR QL STRIP: NEGATIVE
IMM GRANULOCYTES # BLD AUTO: 0 X10E3/UL (ref 0–0.1)
IMM GRANULOCYTES NFR BLD AUTO: 0 %
KETONES UR QL STRIP: NEGATIVE
LDLC SERPL CALC-MCNC: 142 MG/DL (ref 0–99)
LEUKOCYTE ESTERASE UR QL STRIP: NEGATIVE
LYMPHOCYTES # BLD AUTO: 0.7 X10E3/UL (ref 0.7–3.1)
LYMPHOCYTES NFR BLD AUTO: 6 %
MCH RBC QN AUTO: 26.7 PG (ref 26.6–33)
MCHC RBC AUTO-ENTMCNC: 31.9 G/DL (ref 31.5–35.7)
MCV RBC AUTO: 84 FL (ref 79–97)
MICRO URNS: ABNORMAL
MICROALBUMIN UR-MCNC: 1047.8 UG/ML
MONOCYTES # BLD AUTO: 0.9 X10E3/UL (ref 0.1–0.9)
MONOCYTES NFR BLD AUTO: 8 %
NEUTROPHILS # BLD AUTO: 9.8 X10E3/UL (ref 1.4–7)
NEUTROPHILS NFR BLD AUTO: 85 %
NITRITE UR QL STRIP: NEGATIVE
PH UR STRIP: 6.5 [PH] (ref 5–7.5)
PLATELET # BLD AUTO: 166 X10E3/UL (ref 150–450)
POTASSIUM SERPL-SCNC: 3.3 MMOL/L (ref 3.5–5.2)
PROT SERPL-MCNC: 6.9 G/DL (ref 6–8.5)
PROT UR QL STRIP: ABNORMAL
RBC # BLD AUTO: 5.28 X10E6/UL (ref 4.14–5.8)
RBC #/AREA URNS HPF: NORMAL /HPF (ref 0–2)
SODIUM SERPL-SCNC: 134 MMOL/L (ref 134–144)
SP GR UR STRIP: 1.01 (ref 1–1.03)
TRIGL SERPL-MCNC: 115 MG/DL (ref 0–149)
TSH SERPL DL<=0.005 MIU/L-ACNC: 1.24 UIU/ML (ref 0.45–4.5)
UROBILINOGEN UR STRIP-MCNC: 0.2 MG/DL (ref 0.2–1)
VLDLC SERPL CALC-MCNC: 21 MG/DL (ref 5–40)
WBC # BLD AUTO: 11.5 X10E3/UL (ref 3.4–10.8)
WBC #/AREA URNS HPF: NORMAL /HPF (ref 0–5)

## 2023-02-08 DIAGNOSIS — E78.2 MIXED HYPERLIPIDEMIA: Primary | ICD-10-CM

## 2023-02-08 RX ORDER — ATORVASTATIN CALCIUM 40 MG/1
40 TABLET, FILM COATED ORAL DAILY
Qty: 90 TABLET | Refills: 1 | Status: SHIPPED | OUTPATIENT
Start: 2023-02-08

## 2023-02-08 RX ORDER — ATORVASTATIN CALCIUM 40 MG/1
40 TABLET, FILM COATED ORAL DAILY
COMMUNITY
End: 2023-02-08 | Stop reason: SDUPTHER

## 2023-02-08 NOTE — PROGRESS NOTES
Please let patient know that bad cholesterol is high at 142 with total 214. If you will clarify that he has been taking consistently for past 3 mo. If so then want him to increase the atorvastatin to 40mg daily. If agreeable, update medication list. Does have continued large proteinuria as well as renal function continues abnormal but smilar to baseline. Please fax to nephrologist. Please encourage to bring copy of readings on log over th next 6 weeks.

## 2023-02-08 NOTE — PROGRESS NOTES
Informed wife, Sue Hearing of lab results and recommendations per BORIS Liz NP. She stated patient has been taking cholesterol med. He will need more due to increase to 40 mg. Lab resuls forwarded to [facility] (Dr. Malik Thompson).

## 2023-02-21 DIAGNOSIS — J32.9 RECURRENT SINUSITIS: Primary | ICD-10-CM

## 2023-02-21 RX ORDER — LEVOFLOXACIN 500 MG/1
500 TABLET, FILM COATED ORAL DAILY
Qty: 7 TABLET | Refills: 0 | Status: SHIPPED | OUTPATIENT
Start: 2023-02-21 | End: 2023-02-28

## 2023-04-03 PROBLEM — E11.9 DIABETES MELLITUS (HCC): Status: RESOLVED | Noted: 2021-03-16 | Resolved: 2021-03-28

## 2023-05-19 RX ORDER — CARVEDILOL 12.5 MG/1
TABLET ORAL 2 TIMES DAILY WITH MEALS
COMMUNITY

## 2023-05-19 RX ORDER — ATORVASTATIN CALCIUM 40 MG/1
40 TABLET, FILM COATED ORAL DAILY
COMMUNITY
Start: 2023-02-08

## 2023-05-19 RX ORDER — EZETIMIBE 10 MG/1
10 TABLET ORAL DAILY
COMMUNITY
Start: 2023-02-06

## 2023-05-19 RX ORDER — NITROGLYCERIN 0.4 MG/1
TABLET SUBLINGUAL
COMMUNITY
Start: 2021-01-10

## 2023-05-19 RX ORDER — PRASUGREL 10 MG/1
1 TABLET, FILM COATED ORAL DAILY
COMMUNITY
Start: 2021-01-16

## 2023-05-19 RX ORDER — ERGOCALCIFEROL 1.25 MG/1
CAPSULE ORAL
COMMUNITY
Start: 2022-11-14

## 2023-05-19 RX ORDER — HYDRALAZINE HYDROCHLORIDE 10 MG/1
20 TABLET, FILM COATED ORAL 3 TIMES DAILY
COMMUNITY

## 2023-05-19 RX ORDER — POTASSIUM CHLORIDE 750 MG/1
TABLET, FILM COATED, EXTENDED RELEASE ORAL
COMMUNITY
Start: 2023-01-31

## 2023-05-19 RX ORDER — TAMSULOSIN HYDROCHLORIDE 0.4 MG/1
0.4 CAPSULE ORAL DAILY
COMMUNITY
Start: 2023-02-06

## 2023-05-19 RX ORDER — INSULIN ASPART 100 [IU]/ML
INJECTION, SOLUTION INTRAVENOUS; SUBCUTANEOUS
COMMUNITY
Start: 2023-04-12

## 2023-05-19 RX ORDER — CARVEDILOL 25 MG/1
25 TABLET ORAL 2 TIMES DAILY
COMMUNITY
Start: 2023-01-18

## 2023-05-19 RX ORDER — ASPIRIN 81 MG/1
1 TABLET ORAL DAILY
COMMUNITY
Start: 2021-02-12

## 2023-05-19 RX ORDER — ISOSORBIDE MONONITRATE 60 MG/1
60 TABLET, EXTENDED RELEASE ORAL DAILY
COMMUNITY
Start: 2023-01-09

## 2023-05-19 RX ORDER — HYDROCODONE BITARTRATE AND ACETAMINOPHEN 5; 325 MG/1; MG/1
TABLET ORAL
COMMUNITY
Start: 2021-02-19

## 2023-05-19 RX ORDER — INSULIN GLARGINE 100 [IU]/ML
30 INJECTION, SOLUTION SUBCUTANEOUS DAILY
COMMUNITY

## 2023-05-19 RX ORDER — TORSEMIDE 20 MG/1
2 TABLET ORAL 2 TIMES DAILY
COMMUNITY
Start: 2023-01-31

## 2023-08-17 RX ORDER — ATORVASTATIN CALCIUM 40 MG/1
40 TABLET, FILM COATED ORAL DAILY
Qty: 90 TABLET | Refills: 1 | Status: SHIPPED | OUTPATIENT
Start: 2023-08-17

## 2023-08-17 RX ORDER — TAMSULOSIN HYDROCHLORIDE 0.4 MG/1
0.4 CAPSULE ORAL DAILY
Qty: 90 CAPSULE | Refills: 1 | Status: SHIPPED | OUTPATIENT
Start: 2023-08-17

## 2023-09-08 ENCOUNTER — HOSPITAL ENCOUNTER (OUTPATIENT)
Facility: HOSPITAL | Age: 53
Discharge: HOME | End: 2023-09-08
Payer: COMMERCIAL

## 2023-09-08 DIAGNOSIS — I50.9 CHRONIC HEART FAILURE, UNSPECIFIED HEART FAILURE TYPE (HCC): ICD-10-CM

## 2023-09-08 LAB
ALBUMIN SERPL-MCNC: 2.9 G/DL (ref 3.5–5)
ALBUMIN/GLOB SERPL: 0.6 (ref 1.1–2.2)
ALP SERPL-CCNC: 66 U/L (ref 45–117)
ALT SERPL-CCNC: 11 U/L (ref 12–78)
ANION GAP SERPL CALC-SCNC: 9 MMOL/L (ref 5–15)
AST SERPL W P-5'-P-CCNC: 19 U/L (ref 15–37)
BASOPHILS # BLD: 0 K/UL (ref 0–0.1)
BASOPHILS NFR BLD: 0 % (ref 0–1)
BILIRUB SERPL-MCNC: 0.5 MG/DL (ref 0.2–1)
BNP SERPL-MCNC: 6101 PG/ML
BUN SERPL-MCNC: 55 MG/DL (ref 6–20)
BUN/CREAT SERPL: 10 (ref 12–20)
CA-I BLD-MCNC: 8.9 MG/DL (ref 8.5–10.1)
CHLORIDE SERPL-SCNC: 91 MMOL/L (ref 97–108)
CO2 SERPL-SCNC: 26 MMOL/L (ref 21–32)
CREAT SERPL-MCNC: 5.44 MG/DL (ref 0.7–1.3)
DIFFERENTIAL METHOD BLD: ABNORMAL
EOSINOPHIL # BLD: 0.1 K/UL (ref 0–0.4)
EOSINOPHIL NFR BLD: 1 % (ref 0–7)
ERYTHROCYTE [DISTWIDTH] IN BLOOD BY AUTOMATED COUNT: 12.9 % (ref 11.5–14.5)
GLOBULIN SER CALC-MCNC: 4.7 G/DL (ref 2–4)
GLUCOSE SERPL-MCNC: 556 MG/DL (ref 65–100)
HCT VFR BLD AUTO: 40.4 % (ref 36.6–50.3)
HGB BLD-MCNC: 13.1 G/DL (ref 12.1–17)
IMM GRANULOCYTES # BLD AUTO: 0 K/UL (ref 0–0.04)
IMM GRANULOCYTES NFR BLD AUTO: 0 % (ref 0–0.5)
LYMPHOCYTES # BLD: 0.9 K/UL (ref 0.8–3.5)
LYMPHOCYTES NFR BLD: 11 % (ref 12–49)
MAGNESIUM SERPL-MCNC: 2 MG/DL (ref 1.6–2.4)
MCH RBC QN AUTO: 25.9 PG (ref 26–34)
MCHC RBC AUTO-ENTMCNC: 32.4 G/DL (ref 30–36.5)
MCV RBC AUTO: 80 FL (ref 80–99)
MONOCYTES # BLD: 0.8 K/UL (ref 0–1)
MONOCYTES NFR BLD: 10 % (ref 5–13)
NEUTS SEG # BLD: 6.4 K/UL (ref 1.8–8)
NEUTS SEG NFR BLD: 78 % (ref 32–75)
NRBC # BLD: 0 K/UL (ref 0–0.01)
NRBC BLD-RTO: 0 PER 100 WBC
PLATELET # BLD AUTO: 143 K/UL (ref 150–400)
PMV BLD AUTO: 10.8 FL (ref 8.9–12.9)
POTASSIUM SERPL-SCNC: 3.7 MMOL/L (ref 3.5–5.1)
PROT SERPL-MCNC: 7.6 G/DL (ref 6.4–8.2)
RBC # BLD AUTO: 5.05 M/UL (ref 4.1–5.7)
SODIUM SERPL-SCNC: 126 MMOL/L (ref 136–145)
WBC # BLD AUTO: 8.1 K/UL (ref 4.1–11.1)

## 2023-09-08 PROCEDURE — 85025 COMPLETE CBC W/AUTO DIFF WBC: CPT

## 2023-09-08 PROCEDURE — 80053 COMPREHEN METABOLIC PANEL: CPT

## 2023-09-08 PROCEDURE — 83735 ASSAY OF MAGNESIUM: CPT

## 2023-09-08 PROCEDURE — 36415 COLL VENOUS BLD VENIPUNCTURE: CPT

## 2023-09-08 PROCEDURE — 83880 ASSAY OF NATRIURETIC PEPTIDE: CPT

## 2023-09-24 RX ORDER — EZETIMIBE 10 MG/1
10 TABLET ORAL DAILY
Qty: 90 TABLET | Refills: 1 | Status: SHIPPED | OUTPATIENT
Start: 2023-09-24

## 2023-10-27 ENCOUNTER — OFFICE VISIT (OUTPATIENT)
Facility: CLINIC | Age: 53
End: 2023-10-27
Payer: COMMERCIAL

## 2023-10-27 VITALS
OXYGEN SATURATION: 95 % | RESPIRATION RATE: 20 BRPM | WEIGHT: 256.8 LBS | DIASTOLIC BLOOD PRESSURE: 80 MMHG | HEIGHT: 74 IN | HEART RATE: 95 BPM | SYSTOLIC BLOOD PRESSURE: 134 MMHG | BODY MASS INDEX: 32.96 KG/M2 | TEMPERATURE: 97.6 F

## 2023-10-27 DIAGNOSIS — D64.9 ANEMIA, UNSPECIFIED TYPE: ICD-10-CM

## 2023-10-27 DIAGNOSIS — E66.09 OTHER OBESITY DUE TO EXCESS CALORIES: ICD-10-CM

## 2023-10-27 DIAGNOSIS — N18.4 CHRONIC KIDNEY DISEASE, STAGE 4 (SEVERE) (HCC): ICD-10-CM

## 2023-10-27 DIAGNOSIS — E11.65 UNCONTROLLED TYPE 2 DIABETES MELLITUS WITH HYPERGLYCEMIA (HCC): Primary | ICD-10-CM

## 2023-10-27 DIAGNOSIS — N40.0 BENIGN PROSTATIC HYPERPLASIA WITHOUT LOWER URINARY TRACT SYMPTOMS: ICD-10-CM

## 2023-10-27 DIAGNOSIS — Z91.199 NONCOMPLIANCE WITH DIABETES TREATMENT: ICD-10-CM

## 2023-10-27 DIAGNOSIS — M48.00 SPINAL STENOSIS, UNSPECIFIED SPINAL REGION: ICD-10-CM

## 2023-10-27 DIAGNOSIS — K40.90 UNILATERAL INGUINAL HERNIA WITHOUT OBSTRUCTION OR GANGRENE, RECURRENCE NOT SPECIFIED: ICD-10-CM

## 2023-10-27 DIAGNOSIS — E78.2 MIXED HYPERLIPIDEMIA: ICD-10-CM

## 2023-10-27 DIAGNOSIS — I10 ESSENTIAL (PRIMARY) HYPERTENSION: ICD-10-CM

## 2023-10-27 DIAGNOSIS — I25.10 CAD, MULTIPLE VESSEL: ICD-10-CM

## 2023-10-27 DIAGNOSIS — I50.22 CHRONIC SYSTOLIC (CONGESTIVE) HEART FAILURE (HCC): ICD-10-CM

## 2023-10-27 LAB — HBA1C MFR BLD: 15 %

## 2023-10-27 PROCEDURE — 3074F SYST BP LT 130 MM HG: CPT | Performed by: NURSE PRACTITIONER

## 2023-10-27 PROCEDURE — 3078F DIAST BP <80 MM HG: CPT | Performed by: NURSE PRACTITIONER

## 2023-10-27 PROCEDURE — 83036 HEMOGLOBIN GLYCOSYLATED A1C: CPT | Performed by: NURSE PRACTITIONER

## 2023-10-27 PROCEDURE — 99214 OFFICE O/P EST MOD 30 MIN: CPT | Performed by: NURSE PRACTITIONER

## 2023-10-27 RX ORDER — ACYCLOVIR 400 MG/1
TABLET ORAL
Qty: 3 EACH | Refills: 5 | Status: SHIPPED | OUTPATIENT
Start: 2023-10-27

## 2023-10-27 RX ORDER — HYDRALAZINE HYDROCHLORIDE 25 MG/1
25 TABLET, FILM COATED ORAL 3 TIMES DAILY
COMMUNITY
Start: 2023-08-30 | End: 2023-11-01 | Stop reason: DRUGHIGH

## 2023-10-27 NOTE — PROGRESS NOTES
Chief Complaint   Patient presents with    Diabetes     Follow up     Left ear itching           1. Have you been to the ER, urgent care clinic since your last visit? Hospitalized since your last visit? In chart     2. Have you seen or consulted any other health care providers outside of the [address] [place] [address] Avenue since your last visit? Include any pap smears or colon screening.  No

## 2023-10-27 NOTE — PROGRESS NOTES
Doris Morales is a 50 y.o. male who presents to the office today for the following:    Chief Complaint   Patient presents with    Diabetes          Past Medical History:   Diagnosis Date    Abdominal hernia 1/29/2020    Benign prostatic hyperplasia 3/16/2021    Chronic kidney disease     congenital polycytic kidneys    Congenital polycystic kidney 3/16/2021    Congestive heart failure ([address]) 3/25/2020    Coronary arteriosclerosis 3/25/2020    Diabetes ([address])     Heart attack ([address]) 3/18/2020 and 06/30/2020    History of COVID-19 3/28/2021    Hyperlipidemia     Hypertension     Ill-defined condition     BPH, prostatitis    Ill-defined condition     hernia    Ill-defined condition     ischemic cardiomyopathy    Ill-defined condition     (r) knee pain    Inguinal hernia     Morbid obesity ([address])     Prediabetes 3/28/2021    Spinal stenosis     Type 2 diabetes mellitus with hyperglycemia, without long-term current use of insulin ([address]) 2/2/2023       Past Surgical History:   Procedure Laterality Date    HX HERNIA REPAIR  6169    umbiblical hernia    RI UNLISTED PROCEDURE CARDIAC SURGERY  06/30/2020    Stent placement    RI UNLISTED PROCEDURE CARDIAC SURGERY  04/08/2022    stent placement- has total 4        Family History   Problem Relation Age of Onset    Hypertension Mother     Diabetes Mother     Kidney Disease Mother     Heart Disease Father     Hypertension Sister     Diabetes Brother     Kidney Disease Brother     No Known Problems Sister         Social History     Tobacco Use    Smoking status: Never    Smokeless tobacco: Never   Vaping Use    Vaping Use: Never used   Substance Use Topics    Alcohol use: Not Currently    Drug use: Never      HPI  Patient  with PMH of CAD w/ stents, hyperlipidemia, hypertension, CHF, BPH, prediabetes, CKD, covid 19,chronic back pain, hernia and obesity who is here for follow up of chronic conditions. States that he is taking medications as directed. Follows with cardiology and nephrology.

## 2023-11-01 RX ORDER — TORSEMIDE 20 MG/1
80 TABLET ORAL 2 TIMES DAILY
Qty: 30 TABLET | Refills: 0 | Status: SHIPPED
Start: 2023-11-01

## 2023-11-01 RX ORDER — HYDRALAZINE HYDROCHLORIDE 50 MG/1
50 TABLET, FILM COATED ORAL 3 TIMES DAILY
Qty: 60 TABLET | Refills: 0 | Status: SHIPPED
Start: 2023-11-01

## 2023-11-01 RX ORDER — CARVEDILOL 25 MG/1
50 TABLET ORAL 2 TIMES DAILY
Qty: 60 TABLET | Refills: 0 | Status: SHIPPED
Start: 2023-11-01

## 2023-11-12 ENCOUNTER — PATIENT MESSAGE (OUTPATIENT)
Facility: CLINIC | Age: 53
End: 2023-11-12

## 2023-11-12 DIAGNOSIS — J01.00 ACUTE NON-RECURRENT MAXILLARY SINUSITIS: Primary | ICD-10-CM

## 2023-11-13 RX ORDER — DOXYCYCLINE HYCLATE 100 MG
100 TABLET ORAL 2 TIMES DAILY
Qty: 20 TABLET | Refills: 0 | Status: SHIPPED | OUTPATIENT
Start: 2023-11-13 | End: 2023-11-23

## 2023-11-13 NOTE — TELEPHONE ENCOUNTER
From: Venkat Escudero  To: Jesse Sanders  Sent: 11/12/2023 9:41 PM EST  Subject: Head cold    Last visit my ears were itching. It has continued and now I feel like I have a head cold or sinus infection.  Could you please call something into the pharmacy that will help clear this up   Thanks  Danny Guerrero

## 2023-12-04 RX ORDER — INSULIN ASPART 100 [IU]/ML
INJECTION, SOLUTION INTRAVENOUS; SUBCUTANEOUS
Qty: 36 ML | Refills: 1 | Status: SHIPPED | OUTPATIENT
Start: 2023-12-04

## 2023-12-06 ENCOUNTER — PATIENT MESSAGE (OUTPATIENT)
Facility: CLINIC | Age: 53
End: 2023-12-06

## 2023-12-06 DIAGNOSIS — R05.1 ACUTE COUGH: Primary | ICD-10-CM

## 2023-12-07 ENCOUNTER — HOSPITAL ENCOUNTER (OUTPATIENT)
Facility: HOSPITAL | Age: 53
Discharge: HOME | End: 2023-12-07
Payer: COMMERCIAL

## 2023-12-07 PROCEDURE — 71046 X-RAY EXAM CHEST 2 VIEWS: CPT

## 2023-12-08 ENCOUNTER — OFFICE VISIT (OUTPATIENT)
Facility: CLINIC | Age: 53
End: 2023-12-08
Payer: COMMERCIAL

## 2023-12-08 VITALS
BODY MASS INDEX: 32.62 KG/M2 | TEMPERATURE: 97.7 F | SYSTOLIC BLOOD PRESSURE: 127 MMHG | HEART RATE: 90 BPM | HEIGHT: 74 IN | DIASTOLIC BLOOD PRESSURE: 72 MMHG | RESPIRATION RATE: 18 BRPM | WEIGHT: 254.2 LBS | OXYGEN SATURATION: 98 %

## 2023-12-08 DIAGNOSIS — I50.9 ACUTE ON CHRONIC HEART FAILURE, UNSPECIFIED HEART FAILURE TYPE (HCC): ICD-10-CM

## 2023-12-08 DIAGNOSIS — N18.4 CHRONIC KIDNEY DISEASE, STAGE 4 (SEVERE) (HCC): ICD-10-CM

## 2023-12-08 DIAGNOSIS — J20.9 ACUTE BRONCHITIS, UNSPECIFIED ORGANISM: Primary | ICD-10-CM

## 2023-12-08 PROCEDURE — 3074F SYST BP LT 130 MM HG: CPT | Performed by: NURSE PRACTITIONER

## 2023-12-08 PROCEDURE — 99214 OFFICE O/P EST MOD 30 MIN: CPT | Performed by: NURSE PRACTITIONER

## 2023-12-08 PROCEDURE — 3078F DIAST BP <80 MM HG: CPT | Performed by: NURSE PRACTITIONER

## 2023-12-08 RX ORDER — LEVOFLOXACIN 250 MG/1
TABLET, FILM COATED ORAL
Qty: 6 TABLET | Refills: 0 | Status: SHIPPED | OUTPATIENT
Start: 2023-12-08 | End: 2023-12-22

## 2023-12-08 NOTE — PROGRESS NOTES
Gamaliel Silvestre is a 50 y.o. male who presents to the office today for the following:    Chief Complaint   Patient presents with    Results    Cough          Past Medical History:   Diagnosis Date    Abdominal hernia 1/29/2020    Benign prostatic hyperplasia 3/16/2021    Chronic kidney disease     congenital polycytic kidneys    Congenital polycystic kidney 3/16/2021    Congestive heart failure ([address]) 3/25/2020    Coronary arteriosclerosis 3/25/2020    Diabetes ([address])     Heart attack ([address]) 3/18/2020 and 06/30/2020    History of COVID-19 3/28/2021    Hyperlipidemia     Hypertension     Ill-defined condition     BPH, prostatitis    Ill-defined condition     hernia    Ill-defined condition     ischemic cardiomyopathy    Ill-defined condition     (r) knee pain    Inguinal hernia     Morbid obesity ([address])     Prediabetes 3/28/2021    Spinal stenosis     Type 2 diabetes mellitus with hyperglycemia, without long-term current use of insulin ([address]) 2/2/2023       Past Surgical History:   Procedure Laterality Date    HX HERNIA REPAIR  3662    umbiblical hernia    [place] UNLISTED PROCEDURE CARDIAC SURGERY  06/30/2020    Stent placement    HI UNLISTED PROCEDURE CARDIAC SURGERY  04/08/2022    stent placement- has total 4        Family History   Problem Relation Age of Onset    Hypertension Mother     Diabetes Mother     Kidney Disease Mother     Heart Disease Father     Hypertension Sister     Diabetes Brother     Kidney Disease Brother     No Known Problems Sister         Social History     Tobacco Use    Smoking status: Never    Smokeless tobacco: Never   Vaping Use    Vaping Use: Never used   Substance Use Topics    Alcohol use: Not Currently    Drug use: Never      HPI  Patient  with PMH of CAD w/ stents, hyperlipidemia, hypertension, CHF, BPH, prediabetes, CKD, covid 19,chronic back pain, hernia and obesity who is here regarding persistent cough. Reports symptoms started after what appeared to be a cold in late November 2023.  Did not Surgeon/Pathologist Verbiage (Will Incorporate Name Of Surgeon From Intro If Not Blank): operated in two distinct and integrated capacities as the surgeon and pathologist.

## 2023-12-08 NOTE — PROGRESS NOTES
Chief Complaint   Patient presents with    Results    Cough     Pt had xray yesterday. Still having the cough with mucus     1. Have you been to the ER, urgent care clinic since your last visit? Hospitalized since your last visit? No    2. Have you seen or consulted any other health care providers outside of the [address] [place] [address] Avenue since your last visit? Include any pap smears or colon screening.  No

## 2023-12-14 ENCOUNTER — PATIENT MESSAGE (OUTPATIENT)
Facility: CLINIC | Age: 53
End: 2023-12-14

## 2023-12-14 DIAGNOSIS — R05.1 ACUTE COUGH: Primary | ICD-10-CM

## 2023-12-14 LAB
LEFT VENTRICULAR EJECTION FRACTION HIGH VALUE: 25 %
LV EF: 20 %

## 2023-12-27 ENCOUNTER — HOSPITAL ENCOUNTER (EMERGENCY)
Facility: HOSPITAL | Age: 53
Discharge: HOME | End: 2023-12-27
Attending: EMERGENCY MEDICINE
Payer: COMMERCIAL

## 2023-12-27 ENCOUNTER — APPOINTMENT (OUTPATIENT)
Facility: HOSPITAL | Age: 53
End: 2023-12-27
Attending: EMERGENCY MEDICINE
Payer: COMMERCIAL

## 2023-12-27 VITALS
BODY MASS INDEX: 32.1 KG/M2 | DIASTOLIC BLOOD PRESSURE: 97 MMHG | RESPIRATION RATE: 16 BRPM | HEART RATE: 90 BPM | OXYGEN SATURATION: 97 % | SYSTOLIC BLOOD PRESSURE: 136 MMHG | WEIGHT: 258.2 LBS | HEIGHT: 75 IN | TEMPERATURE: 97.4 F

## 2023-12-27 DIAGNOSIS — E87.70 HYPERVOLEMIA, UNSPECIFIED HYPERVOLEMIA TYPE: ICD-10-CM

## 2023-12-27 DIAGNOSIS — J11.1 INFLUENZA WITH RESPIRATORY MANIFESTATION OTHER THAN PNEUMONIA: Primary | ICD-10-CM

## 2023-12-27 LAB
ALBUMIN SERPL-MCNC: 3.1 G/DL (ref 3.5–5)
ALBUMIN/GLOB SERPL: 0.7 (ref 1.1–2.2)
ALP SERPL-CCNC: 57 U/L (ref 45–117)
ALT SERPL-CCNC: 17 U/L (ref 12–78)
ANION GAP SERPL CALC-SCNC: 12 MMOL/L (ref 5–15)
AST SERPL W P-5'-P-CCNC: 22 U/L (ref 15–37)
BASOPHILS # BLD: 0 K/UL (ref 0–0.1)
BASOPHILS NFR BLD: 0 % (ref 0–1)
BILIRUB SERPL-MCNC: 0.9 MG/DL (ref 0.2–1)
BNP SERPL-MCNC: 7360 PG/ML
BUN SERPL-MCNC: 77 MG/DL (ref 6–20)
BUN/CREAT SERPL: 15 (ref 12–20)
CA-I BLD-MCNC: 9.1 MG/DL (ref 8.5–10.1)
CHLORIDE SERPL-SCNC: 100 MMOL/L (ref 97–108)
CO2 SERPL-SCNC: 27 MMOL/L (ref 21–32)
CREAT SERPL-MCNC: 4.97 MG/DL (ref 0.7–1.3)
DIFFERENTIAL METHOD BLD: ABNORMAL
EKG ATRIAL RATE: 97 BPM
EKG DIAGNOSIS: NORMAL
EKG P AXIS: 45 DEGREES
EKG P-R INTERVAL: 190 MS
EKG Q-T INTERVAL: 373 MS
EKG QRS DURATION: 126 MS
EKG QTC CALCULATION (BAZETT): 474 MS
EKG R AXIS: -34 DEGREES
EKG T AXIS: 152 DEGREES
EKG VENTRICULAR RATE: 97 BPM
EOSINOPHIL # BLD: 0.1 K/UL (ref 0–0.4)
EOSINOPHIL NFR BLD: 1 % (ref 0–7)
ERYTHROCYTE [DISTWIDTH] IN BLOOD BY AUTOMATED COUNT: 13.6 % (ref 11.5–14.5)
FLUAV RNA SPEC QL NAA+PROBE: DETECTED
FLUBV RNA SPEC QL NAA+PROBE: NOT DETECTED
GLOBULIN SER CALC-MCNC: 4.2 G/DL (ref 2–4)
GLUCOSE SERPL-MCNC: 214 MG/DL (ref 65–100)
HCT VFR BLD AUTO: 38.8 % (ref 36.6–50.3)
HGB BLD-MCNC: 12.5 G/DL (ref 12.1–17)
IMM GRANULOCYTES # BLD AUTO: 0 K/UL (ref 0–0.04)
IMM GRANULOCYTES NFR BLD AUTO: 0 % (ref 0–0.5)
LYMPHOCYTES # BLD: 0.3 K/UL (ref 0.8–3.5)
LYMPHOCYTES NFR BLD: 3 % (ref 12–49)
MCH RBC QN AUTO: 26.7 PG (ref 26–34)
MCHC RBC AUTO-ENTMCNC: 32.2 G/DL (ref 30–36.5)
MCV RBC AUTO: 82.9 FL (ref 80–99)
MONOCYTES # BLD: 1.1 K/UL (ref 0–1)
MONOCYTES NFR BLD: 11 % (ref 5–13)
NEUTS SEG # BLD: 8.6 K/UL (ref 1.8–8)
NEUTS SEG NFR BLD: 85 % (ref 32–75)
NRBC # BLD: 0 K/UL (ref 0–0.01)
NRBC BLD-RTO: 0 PER 100 WBC
PLATELET # BLD AUTO: 169 K/UL (ref 150–400)
PMV BLD AUTO: 10.9 FL (ref 8.9–12.9)
POTASSIUM SERPL-SCNC: 4 MMOL/L (ref 3.5–5.1)
PROT SERPL-MCNC: 7.3 G/DL (ref 6.4–8.2)
RBC # BLD AUTO: 4.68 M/UL (ref 4.1–5.7)
RBC MORPH BLD: ABNORMAL
SARS-COV-2 RNA RESP QL NAA+PROBE: NOT DETECTED
SODIUM SERPL-SCNC: 139 MMOL/L (ref 136–145)
TROPONIN I SERPL HS-MCNC: 45 NG/L (ref 0–76)
TROPONIN I SERPL HS-MCNC: 51 NG/L (ref 0–76)
WBC # BLD AUTO: 10.1 K/UL (ref 4.1–11.1)

## 2023-12-27 PROCEDURE — 36415 COLL VENOUS BLD VENIPUNCTURE: CPT

## 2023-12-27 PROCEDURE — 6370000000 HC RX 637 (ALT 250 FOR IP): Performed by: EMERGENCY MEDICINE

## 2023-12-27 PROCEDURE — 87636 SARSCOV2 & INF A&B AMP PRB: CPT

## 2023-12-27 PROCEDURE — 83880 ASSAY OF NATRIURETIC PEPTIDE: CPT

## 2023-12-27 PROCEDURE — 85025 COMPLETE CBC W/AUTO DIFF WBC: CPT

## 2023-12-27 PROCEDURE — 80053 COMPREHEN METABOLIC PANEL: CPT

## 2023-12-27 PROCEDURE — 93005 ELECTROCARDIOGRAM TRACING: CPT | Performed by: EMERGENCY MEDICINE

## 2023-12-27 PROCEDURE — 84484 ASSAY OF TROPONIN QUANT: CPT

## 2023-12-27 PROCEDURE — 6360000002 HC RX W HCPCS: Performed by: EMERGENCY MEDICINE

## 2023-12-27 PROCEDURE — 99285 EMERGENCY DEPT VISIT HI MDM: CPT

## 2023-12-27 PROCEDURE — 96374 THER/PROPH/DIAG INJ IV PUSH: CPT

## 2023-12-27 PROCEDURE — 94640 AIRWAY INHALATION TREATMENT: CPT

## 2023-12-27 PROCEDURE — 71045 X-RAY EXAM CHEST 1 VIEW: CPT

## 2023-12-27 RX ORDER — OSELTAMIVIR PHOSPHATE 30 MG/1
30 CAPSULE ORAL ONCE
Status: COMPLETED | OUTPATIENT
Start: 2023-12-27 | End: 2023-12-27

## 2023-12-27 RX ORDER — ALBUTEROL SULFATE 90 UG/1
2 AEROSOL, METERED RESPIRATORY (INHALATION) 4 TIMES DAILY PRN
Qty: 18 G | Refills: 0 | Status: SHIPPED | OUTPATIENT
Start: 2023-12-27

## 2023-12-27 RX ORDER — OSELTAMIVIR PHOSPHATE 30 MG/1
30 CAPSULE ORAL DAILY
Qty: 5 CAPSULE | Refills: 0 | Status: SHIPPED | OUTPATIENT
Start: 2023-12-28 | End: 2024-01-02

## 2023-12-27 RX ORDER — BENZONATATE 100 MG/1
100 CAPSULE ORAL 2 TIMES DAILY PRN
Qty: 20 CAPSULE | Refills: 0 | Status: SHIPPED | OUTPATIENT
Start: 2023-12-27 | End: 2024-01-06

## 2023-12-27 RX ORDER — IPRATROPIUM BROMIDE AND ALBUTEROL SULFATE 2.5; .5 MG/3ML; MG/3ML
1 SOLUTION RESPIRATORY (INHALATION)
Status: COMPLETED | OUTPATIENT
Start: 2023-12-27 | End: 2023-12-27

## 2023-12-27 RX ORDER — FUROSEMIDE 10 MG/ML
80 INJECTION INTRAMUSCULAR; INTRAVENOUS
Status: COMPLETED | OUTPATIENT
Start: 2023-12-27 | End: 2023-12-27

## 2023-12-27 RX ADMIN — FUROSEMIDE 80 MG: 10 INJECTION, SOLUTION INTRAMUSCULAR; INTRAVENOUS at 08:09

## 2023-12-27 RX ADMIN — IPRATROPIUM BROMIDE AND ALBUTEROL SULFATE 1 DOSE: .5; 3 SOLUTION RESPIRATORY (INHALATION) at 08:01

## 2023-12-27 RX ADMIN — OSELTAMIVIR PHOSPHATE 30 MG: 30 CAPSULE ORAL at 09:36

## 2023-12-27 ASSESSMENT — PAIN - FUNCTIONAL ASSESSMENT: PAIN_FUNCTIONAL_ASSESSMENT: WONG-BAKER FACES

## 2023-12-27 ASSESSMENT — PAIN SCALES - WONG BAKER: WONGBAKER_NUMERICALRESPONSE: 0

## 2023-12-27 NOTE — ED NOTES
[facility] EMERGENCY DEPT  EMERGENCY DEPARTMENT ENCOUNTER      Pt Name: Celment Reardon  MRN: [id number]  [address] [facility] 1970  Date of evaluation: 12/27/2023  Provider: Jelena Wolf. MD Marco  7:23 AM    CHIEF COMPLAINT       Chief Complaint   Patient presents with    Shortness of Breath         HISTORY OF PRESENT ILLNESS    Clement Reardon is a 48 y.o. male with history of CAD, myocardial infarction, stents, chronic kidney disease, hypertension, diabetes, who presents to the emergency department with complaint of coughing, shortness of breath, orthopnea for several weeks. He denies chest pain. Nursing Notes were reviewed. REVIEW OF SYSTEMS       Review of Systems   Constitutional: Negative. HENT: Negative. Eyes: Negative. Respiratory:  Positive for cough and shortness of breath. Cardiovascular:  Positive for leg swelling. Negative for chest pain. Gastrointestinal: Negative. Endocrine: Negative. Genitourinary: Negative. Musculoskeletal: Negative. Skin: Negative. Neurological: Negative. Hematological: Negative. Except as noted above the remainder of the review of systems was reviewed and negative.        PAST MEDICAL HISTORY     Past Medical History:   Diagnosis Date    Abdominal hernia 1/29/2020    Benign prostatic hyperplasia 3/16/2021    Chronic kidney disease     congenital polycytic kidneys    Congenital polycystic kidney 3/16/2021    Congestive heart failure ([address]) 3/25/2020    Coronary arteriosclerosis 3/25/2020    Diabetes ([address])     Heart attack ([address]) 3/18/2020 and 06/30/2020    History of COVID-19 3/28/2021    Hyperlipidemia     Hypertension     Ill-defined condition     BPH, prostatitis    Ill-defined condition     hernia    Ill-defined condition     ischemic cardiomyopathy    Ill-defined condition     (r) knee pain    Inguinal hernia     Morbid obesity ([address])     Prediabetes 3/28/2021    Spinal stenosis     Type 2 diabetes mellitus with hyperglycemia, without long-term current use   Pulmonary:      Effort: Pulmonary effort is normal.      Breath sounds: Examination of the right-middle field reveals wheezing. Examination of the left-middle field reveals wheezing. Examination of the right-lower field reveals wheezing. Examination of the left-lower field reveals wheezing. Wheezing present.   Chest:      Chest wall: No tenderness.   Abdominal:      General: Bowel sounds are normal.      Palpations: Abdomen is soft.   Musculoskeletal:      Right lower leg: Edema present.      Left lower leg: Edema present.   Skin:     General: Skin is warm.      Capillary Refill: Capillary refill takes less than 2 seconds.   Neurological:      General: No focal deficit present.      Mental Status: He is alert and oriented to person, place, and time.         DIAGNOSTIC RESULTS     EKG: All EKG's are interpreted by the Emergency Department Physician who either signs or Co-signs this chart in the absence of a cardiologist.    EKG interpreted by me:  Normal sinus rhythm, left axis deviation, 97 rate, normal WI interval, normal QRS, nonspecific ST-T segments, LVH with early repolarization abnormality.    RADIOLOGY:   Non-plain film images such as CT, Ultrasound and MRI are read by the radiologist. Plain radiographic images are visualized and preliminarily interpreted by the emergency physician with the below findings:        Interpretation per the Radiologist below, if available at the time of this note:    XR CHEST PORTABLE    (Results Pending)         ED BEDSIDE ULTRASOUND:   Performed by ED Physician - none    LABS:  Labs Reviewed   CBC WITH AUTO DIFFERENTIAL   COMPREHENSIVE METABOLIC PANEL   BRAIN NATRIURETIC PEPTIDE   TROPONIN       All other labs were within normal range or not returned as of this dictation.    EMERGENCY DEPARTMENT COURSE and DIFFERENTIAL DIAGNOSIS/MDM:   Vitals:    Vitals:    12/27/23 0659   BP: (!) 134/102   Pulse: 98   Resp: 23   Temp: 97.4 °F (36.3 °C)   TempSrc: Temporal   SpO2: 95%

## 2023-12-27 NOTE — ED NOTES
Pt not felt to be septic. Felt abnormal VS due to volume status.       Mecca Timmons MD  12/27/23 7109

## 2023-12-27 NOTE — ED NOTES
Pt ambulated. Reports symptoms improved with ambulation and oxygenation remained >93%.  Will proceed with plan for repeat troponin, likely dc home     Mecca Timmons MD  12/27/23 8264

## 2023-12-27 NOTE — ED NOTES
Flu A + will treat with renally reduced dose.  Follow up with pmd or return should symptoms worsen      Roberto Tucker MD  12/27/23 1365

## 2023-12-27 NOTE — ED NOTES
Pt signed out at shift change. He is feeling improved. Will continue with plan as per sign out. Will ambulate and if doing well, dc home with op treatment and strict return precautions.       Yasmine Torre MD  12/27/23 5983

## 2023-12-27 NOTE — ED TRIAGE NOTES
Pt is experiencing cough and shortness of breath. The symptoms have been going on for over a week. Pt is experiencing labored breathing in triage, and is hypertensive.  Pt has hx of MI.

## 2024-01-07 ENCOUNTER — PATIENT MESSAGE (OUTPATIENT)
Age: 54
End: 2024-01-07

## 2024-01-17 ENCOUNTER — PATIENT MESSAGE (OUTPATIENT)
Facility: CLINIC | Age: 54
End: 2024-01-17

## 2024-01-17 DIAGNOSIS — R05.1 ACUTE COUGH: Primary | ICD-10-CM

## 2024-01-17 RX ORDER — DOXYCYCLINE HYCLATE 100 MG
100 TABLET ORAL 2 TIMES DAILY
Qty: 20 TABLET | Refills: 0 | Status: SHIPPED | OUTPATIENT
Start: 2024-01-17 | End: 2024-01-27

## 2024-01-23 ENCOUNTER — HOSPITAL ENCOUNTER (OUTPATIENT)
Facility: HOSPITAL | Age: 54
Discharge: HOME | End: 2024-01-26
Attending: STUDENT IN AN ORGANIZED HEALTH CARE EDUCATION/TRAINING PROGRAM
Payer: COMMERCIAL

## 2024-01-23 ENCOUNTER — PATIENT MESSAGE (OUTPATIENT)
Facility: CLINIC | Age: 54
End: 2024-01-23

## 2024-01-23 ENCOUNTER — OFFICE VISIT (OUTPATIENT)
Age: 54
End: 2024-01-23
Payer: COMMERCIAL

## 2024-01-23 VITALS
DIASTOLIC BLOOD PRESSURE: 81 MMHG | OXYGEN SATURATION: 90 % | SYSTOLIC BLOOD PRESSURE: 121 MMHG | HEART RATE: 82 BPM | WEIGHT: 260.6 LBS | TEMPERATURE: 97.7 F | BODY MASS INDEX: 32.4 KG/M2 | RESPIRATION RATE: 20 BRPM | HEIGHT: 75 IN

## 2024-01-23 DIAGNOSIS — N18.5 STAGE 5 CHRONIC KIDNEY DISEASE NOT ON CHRONIC DIALYSIS (HCC): ICD-10-CM

## 2024-01-23 DIAGNOSIS — E78.2 MIXED HYPERLIPIDEMIA: ICD-10-CM

## 2024-01-23 DIAGNOSIS — R05.2 SUBACUTE COUGH: ICD-10-CM

## 2024-01-23 DIAGNOSIS — J20.9 ACUTE BRONCHITIS, UNSPECIFIED ORGANISM: Primary | ICD-10-CM

## 2024-01-23 DIAGNOSIS — E11.65 TYPE 2 DIABETES MELLITUS WITH HYPERGLYCEMIA, WITHOUT LONG-TERM CURRENT USE OF INSULIN (HCC): Primary | ICD-10-CM

## 2024-01-23 LAB — HBA1C MFR BLD: 11.6 %

## 2024-01-23 PROCEDURE — 3074F SYST BP LT 130 MM HG: CPT | Performed by: STUDENT IN AN ORGANIZED HEALTH CARE EDUCATION/TRAINING PROGRAM

## 2024-01-23 PROCEDURE — 83036 HEMOGLOBIN GLYCOSYLATED A1C: CPT | Performed by: STUDENT IN AN ORGANIZED HEALTH CARE EDUCATION/TRAINING PROGRAM

## 2024-01-23 PROCEDURE — 3079F DIAST BP 80-89 MM HG: CPT | Performed by: STUDENT IN AN ORGANIZED HEALTH CARE EDUCATION/TRAINING PROGRAM

## 2024-01-23 PROCEDURE — 99214 OFFICE O/P EST MOD 30 MIN: CPT | Performed by: STUDENT IN AN ORGANIZED HEALTH CARE EDUCATION/TRAINING PROGRAM

## 2024-01-23 PROCEDURE — 71046 X-RAY EXAM CHEST 2 VIEWS: CPT

## 2024-01-23 RX ORDER — INSULIN GLARGINE 100 [IU]/ML
15 INJECTION, SOLUTION SUBCUTANEOUS DAILY
Qty: 5 ADJUSTABLE DOSE PRE-FILLED PEN SYRINGE | Refills: 0 | Status: SHIPPED | OUTPATIENT
Start: 2024-01-23

## 2024-01-23 RX ORDER — LEVOFLOXACIN 250 MG/1
250 TABLET, FILM COATED ORAL
Qty: 7 TABLET | Refills: 0 | Status: SHIPPED | OUTPATIENT
Start: 2024-01-23 | End: 2024-02-01

## 2024-01-23 NOTE — PROGRESS NOTES
1. \"Have you been to the ER, urgent care clinic since your last visit?  Hospitalized since your last visit?\" Yes    2. \"Have you seen or consulted any other health care providers outside of the [facility] since your last visit?\" Yes- [facility]    3. For patients aged 45-75: Has the patient had a colonoscopy / FIT/ Cologuard? No      If the patient is female:    4. For patients aged 40-74: Has the patient had a mammogram within the past 2 years?       5. For patients aged 21-65: Has the patient had a pap smear?     Chief Complaint   Patient presents with    Follow-up    Diabetes     /81 (Site: Left Upper Arm, Position: Sitting, Cuff Size: Medium Adult)   Pulse 82   Temp 97.7 °F (36.5 °C) (Oral)   Resp 20   Ht 1.905 m (6' 3\")   Wt 118.2 kg (260 lb 9.6 oz)   SpO2 90%   BMI 32.57 kg/m²

## 2024-01-23 NOTE — PROGRESS NOTES
[facility]                                                                                    Mayra Watters M.D          Patient Information  Date:1/23/2024  Name : Miguel Marc 53 y.o.     YOB: 1970         Referred by: Ileana Anne APRN - NP       Chief Complaint   Patient presents with    Follow-up    Diabetes       History of Present Illness: Miguel Marc is a 53 y.o. male with APCKD, Hypertension, Spinal stenosis, CHF who presented to follow up.       Reports doing ok. Got Flu in December, felt better, started to feel worse.   Got doxycycle.   Still coughing, occasionally blood streaked sputum   Taking Lantus 10 units and  novolog 10-15 units with meals     Type 2 diabetes mellitus    Glucometer reading:          · Diagnosis: 2 years ago   · Current treatment: novolog 20 u AC no Lantus   · Past treatment: Insulin   · Glucose checks DEXCOM   · Hyperglycemia: yes   · Hypoglycemia: no   · Meals per day: 3  ---Breakfast : 2 eggs and tukery dawson   ----Lunch : turkey, salad   ----Dinner: baked chicken, blacked eye peas   -----Snacks: None   -----Drinks: diet drink no added sugar juice ,diet Gatorade   · Exercise: no  Steroids:no  · DM related hospitalizations:     Smoking: no  Family history of coronary artery disease/stroke: yes     Complications of DM:  · CAD:  yes CAD   · CVA: no  · PVD: no  · Amputations: no   · Retinopathy: no; last exam was 2 year ago   · Gastropathy: no  · Nephropathy: no  · Neuropathy: no   Sees podiatrist: no     Medications:  · Statin: atorvastatin   · ACE-I: none   · ASA: yes aspirin     · Diabetes education:yes   Cardiologist Dr. Bailon   He works as    Past Medical History:   Diagnosis Date    Abdominal hernia 1/29/2020    Benign prostatic hyperplasia 3/16/2021    Chronic kidney disease     congenital polycytic kidneys    Congenital polycystic kidney 3/16/2021    Congestive heart failure (HCC) 3/25/2020    Coronary

## 2024-01-23 NOTE — PROGRESS NOTES
-----------------------------  Dexcom Clarity  -----------------------------  Miguel Marc  YOB: 1970  Generated at: Tue, Jan 23, 2024 8:37 AM EST  Reporting period: Wed Dorian 10, 2024 - Tue Jan 23, 2024  -----------------------------  Glucose Details  Average glucose: 275 mg/dL  Standard deviation: 69 mg/dL  GMI: 9.9%  -----------------------------  Time in Range  Very High: 59%  High: 34%  In Range: 7%  Low: 0%  Very Low: 0%    Target Range   mg/dL    -----------------------------  CGM Details  Sensor usage: 93%  Days with CGM data: 13/14

## 2024-01-24 LAB
CHOLEST SERPL-MCNC: 164 MG/DL (ref 100–199)
HDLC SERPL-MCNC: 41 MG/DL
LDLC SERPL CALC-MCNC: 100 MG/DL (ref 0–99)
TRIGL SERPL-MCNC: 126 MG/DL (ref 0–149)
VLDLC SERPL CALC-MCNC: 23 MG/DL (ref 5–40)

## 2024-02-02 ENCOUNTER — TELEPHONE (OUTPATIENT)
Age: 54
End: 2024-02-02

## 2024-02-02 NOTE — TELEPHONE ENCOUNTER
Called and spoke with pt's wife who confirmed 2 pt identifiers, pt's wife was provided with pt's results and providers recommendations

## 2024-02-12 ENCOUNTER — HOSPITAL ENCOUNTER (OUTPATIENT)
Facility: HOSPITAL | Age: 54
Discharge: HOME | End: 2024-02-15
Payer: COMMERCIAL

## 2024-02-12 LAB
ALBUMIN SERPL-MCNC: 2.5 G/DL (ref 3.5–5)
ALBUMIN/GLOB SERPL: 0.7 (ref 1.1–2.2)
ALP SERPL-CCNC: 38 U/L (ref 45–117)
ALT SERPL-CCNC: 17 U/L (ref 12–78)
ANION GAP SERPL CALC-SCNC: 6 MMOL/L (ref 5–15)
AST SERPL-CCNC: 16 U/L (ref 15–37)
BILIRUB SERPL-MCNC: 0.7 MG/DL (ref 0.2–1)
BUN SERPL-MCNC: 70 MG/DL (ref 6–20)
BUN/CREAT SERPL: 13 (ref 12–20)
CALCIUM SERPL-MCNC: 8.3 MG/DL (ref 8.5–10.1)
CHLORIDE SERPL-SCNC: 108 MMOL/L (ref 97–108)
CO2 SERPL-SCNC: 27 MMOL/L (ref 21–32)
CREAT SERPL-MCNC: 5.36 MG/DL (ref 0.7–1.3)
ERYTHROCYTE [DISTWIDTH] IN BLOOD BY AUTOMATED COUNT: 13.2 % (ref 11.5–14.5)
GLOBULIN SER CALC-MCNC: 3.6 G/DL (ref 2–4)
GLUCOSE SERPL-MCNC: 219 MG/DL (ref 65–100)
HCT VFR BLD AUTO: 34 % (ref 36.6–50.3)
HGB BLD-MCNC: 10.2 G/DL (ref 12.1–17)
MCH RBC QN AUTO: 25.5 PG (ref 26–34)
MCHC RBC AUTO-ENTMCNC: 30 G/DL (ref 30–36.5)
MCV RBC AUTO: 85 FL (ref 80–99)
NRBC # BLD: 0 K/UL (ref 0–0.01)
NRBC BLD-RTO: 0 PER 100 WBC
PLATELET # BLD AUTO: 163 K/UL (ref 150–400)
PMV BLD AUTO: 11.3 FL (ref 8.9–12.9)
POTASSIUM SERPL-SCNC: 3.6 MMOL/L (ref 3.5–5.1)
PROT SERPL-MCNC: 6.1 G/DL (ref 6.4–8.2)
RBC # BLD AUTO: 4 M/UL (ref 4.1–5.7)
SODIUM SERPL-SCNC: 141 MMOL/L (ref 136–145)
WBC # BLD AUTO: 7.1 K/UL (ref 4.1–11.1)

## 2024-02-12 PROCEDURE — APPNB15 APP NON BILLABLE TIME 0-15 MINS: Performed by: NURSE PRACTITIONER

## 2024-02-12 PROCEDURE — 36415 COLL VENOUS BLD VENIPUNCTURE: CPT

## 2024-02-12 PROCEDURE — 80053 COMPREHEN METABOLIC PANEL: CPT

## 2024-02-12 PROCEDURE — 85027 COMPLETE CBC AUTOMATED: CPT

## 2024-02-12 RX ORDER — OMEPRAZOLE 10 MG/1
10 CAPSULE, DELAYED RELEASE ORAL DAILY
COMMUNITY

## 2024-02-13 NOTE — PROGRESS NOTES
EKG from 12/27/23 reviewed with Dr. Gregory, anesthesiologist and compared with previous EKGs from 12/17/23, 1/4/23. Planned procedure, PMHx, most recent AMANDA 12/14/2023 and cardiac evaluation by Dr. PAULA Camacho @ [facility] 8/24/23, functional status reviewed. Pt ok to proceed with planned procedure per Dr. Gregory    
EKG in CC resulted and dated 12/27/23 - will not repeat at PAT visit  
surgery.  Wear your hair loose or down, no ponytails, buns, nik pins or clips.  All body piercings must be removed.  Please shower with antibacterial soap for three consecutive days before and on the morning of surgery, but do not apply any lotions, powders or deodorants after the shower on the day of surgery. Please use a fresh towels after each shower. Please sleep in clean clothes and change bed linens the night before surgery.  Please do not shave for 48 hours prior to surgery. Shaving of the face is acceptable.    7. You should understand that if you do not follow these instructions your surgery may be cancelled.  If your physical condition changes (I.e. fever, cold or flu) please contact your surgeon as soon as possible.    8. It is important that you be on time.  If a situation occurs where you may be late, please call [phone] (OR Holding Area).    9. If you have any questions and or problems, please call [phone] (Pre-admission Testing).    10. Your surgery time may be subject to change.  You will receive a phone call the evening prior if your time changes.    11.  If having outpatient surgery, you must have someone to drive you here, stay with you during the duration of your stay, and to drive you home at time of discharge.       TAKE ALL MEDICATIONS DAY OF SURGERY EXCEPT:Aspirin,Insulin      I understand a pre-operative phone call will be made to verify my surgery time.  In the event that I am not available, I give permission for a message to be left on my answering service and/or with another person?  yes         ___________________      __________   _________    (Signature of Patient)             (Witness)                (Date and Time)

## 2024-02-23 RX ORDER — TAMSULOSIN HYDROCHLORIDE 0.4 MG/1
0.4 CAPSULE ORAL DAILY
Qty: 90 CAPSULE | Refills: 1 | Status: SHIPPED | OUTPATIENT
Start: 2024-02-23

## 2024-02-23 RX ORDER — ATORVASTATIN CALCIUM 40 MG/1
40 TABLET, FILM COATED ORAL DAILY
Qty: 90 TABLET | Refills: 1 | Status: SHIPPED | OUTPATIENT
Start: 2024-02-23

## 2024-02-26 ENCOUNTER — TRANSCRIBE ORDERS (OUTPATIENT)
Facility: HOSPITAL | Age: 54
End: 2024-02-26

## 2024-02-26 ENCOUNTER — HOSPITAL ENCOUNTER (OUTPATIENT)
Facility: HOSPITAL | Age: 54
Discharge: HOME | End: 2024-02-29
Payer: COMMERCIAL

## 2024-02-26 DIAGNOSIS — N18.5 CHRONIC KIDNEY DISEASE, STAGE V (HCC): Primary | ICD-10-CM

## 2024-02-26 DIAGNOSIS — N18.5 CHRONIC KIDNEY DISEASE, STAGE V (HCC): ICD-10-CM

## 2024-02-26 LAB
ALBUMIN SERPL-MCNC: 2.7 G/DL (ref 3.5–5)
ANION GAP SERPL CALC-SCNC: 6 MMOL/L (ref 5–15)
BASOPHILS # BLD: 0 K/UL (ref 0–0.1)
BASOPHILS NFR BLD: 0 % (ref 0–1)
BUN SERPL-MCNC: 63 MG/DL (ref 6–20)
BUN/CREAT SERPL: 11 (ref 12–20)
CA-I BLD-MCNC: 8.4 MG/DL (ref 8.5–10.1)
CHLORIDE SERPL-SCNC: 102 MMOL/L (ref 97–108)
CO2 SERPL-SCNC: 29 MMOL/L (ref 21–32)
CREAT SERPL-MCNC: 5.57 MG/DL (ref 0.7–1.3)
DIFFERENTIAL METHOD BLD: ABNORMAL
EOSINOPHIL # BLD: 0.1 K/UL (ref 0–0.4)
EOSINOPHIL NFR BLD: 1 % (ref 0–7)
ERYTHROCYTE [DISTWIDTH] IN BLOOD BY AUTOMATED COUNT: 13.3 % (ref 11.5–14.5)
GLUCOSE SERPL-MCNC: 275 MG/DL (ref 65–100)
HCT VFR BLD AUTO: 33.6 % (ref 36.6–50.3)
HGB BLD-MCNC: 10.4 G/DL (ref 12.1–17)
IMM GRANULOCYTES # BLD AUTO: 0 K/UL (ref 0–0.04)
IMM GRANULOCYTES NFR BLD AUTO: 0 % (ref 0–0.5)
LYMPHOCYTES # BLD: 0.6 K/UL (ref 0.8–3.5)
LYMPHOCYTES NFR BLD: 6 % (ref 12–49)
MCH RBC QN AUTO: 24.9 PG (ref 26–34)
MCHC RBC AUTO-ENTMCNC: 31 G/DL (ref 30–36.5)
MCV RBC AUTO: 80.6 FL (ref 80–99)
MONOCYTES # BLD: 0.9 K/UL (ref 0–1)
MONOCYTES NFR BLD: 9 % (ref 5–13)
NEUTS SEG # BLD: 7.9 K/UL (ref 1.8–8)
NEUTS SEG NFR BLD: 84 % (ref 32–75)
NRBC # BLD: 0 K/UL (ref 0–0.01)
NRBC BLD-RTO: 0 PER 100 WBC
PHOSPHATE SERPL-MCNC: 4.8 MG/DL (ref 2.6–4.7)
PLATELET # BLD AUTO: 149 K/UL (ref 150–400)
PMV BLD AUTO: 10.1 FL (ref 8.9–12.9)
POTASSIUM SERPL-SCNC: 4.1 MMOL/L (ref 3.5–5.1)
RBC # BLD AUTO: 4.17 M/UL (ref 4.1–5.7)
RBC MORPH BLD: ABNORMAL
SODIUM SERPL-SCNC: 137 MMOL/L (ref 136–145)
WBC # BLD AUTO: 9.5 K/UL (ref 4.1–11.1)

## 2024-02-26 PROCEDURE — 80069 RENAL FUNCTION PANEL: CPT

## 2024-02-26 PROCEDURE — 86704 HEP B CORE ANTIBODY TOTAL: CPT

## 2024-02-26 PROCEDURE — 86709 HEPATITIS A IGM ANTIBODY: CPT

## 2024-02-26 PROCEDURE — 86706 HEP B SURFACE ANTIBODY: CPT

## 2024-02-26 PROCEDURE — 36415 COLL VENOUS BLD VENIPUNCTURE: CPT

## 2024-02-26 PROCEDURE — 85025 COMPLETE CBC W/AUTO DIFF WBC: CPT

## 2024-02-27 ENCOUNTER — TELEPHONE (OUTPATIENT)
Age: 54
End: 2024-02-27

## 2024-02-27 NOTE — TELEPHONE ENCOUNTER
Called and spoke with BeckyMonico she states that the patient saw his Cardiologist in October and was cleared for surgery. She is going to call them to see if they will fax over the last office note stating that he is cleared for surgery before his appointment on Tuesday 03/05/2024

## 2024-02-29 LAB
HBV CORE AB SERPL QL IA: NEGATIVE
HBV CORE IGM SER QL: NONREACTIVE
HBV SURFACE AB SER QL: NONREACTIVE
HBV SURFACE AB SER-ACNC: <3.1 MIU/ML

## 2024-03-05 ENCOUNTER — TRANSCRIBE ORDERS (OUTPATIENT)
Facility: HOSPITAL | Age: 54
End: 2024-03-05

## 2024-03-05 ENCOUNTER — HOSPITAL ENCOUNTER (OUTPATIENT)
Facility: HOSPITAL | Age: 54
Discharge: HOME | End: 2024-03-08
Payer: COMMERCIAL

## 2024-03-05 DIAGNOSIS — N18.5 CHRONIC KIDNEY DISEASE, STAGE V (HCC): Primary | ICD-10-CM

## 2024-03-05 DIAGNOSIS — N18.5 CHRONIC KIDNEY DISEASE, STAGE V (HCC): ICD-10-CM

## 2024-03-05 PROCEDURE — 36415 COLL VENOUS BLD VENIPUNCTURE: CPT

## 2024-03-05 PROCEDURE — 86706 HEP B SURFACE ANTIBODY: CPT

## 2024-03-05 PROCEDURE — 87340 HEPATITIS B SURFACE AG IA: CPT

## 2024-03-05 PROCEDURE — 86704 HEP B CORE ANTIBODY TOTAL: CPT

## 2024-03-05 PROCEDURE — 86803 HEPATITIS C AB TEST: CPT

## 2024-03-05 PROCEDURE — 86709 HEPATITIS A IGM ANTIBODY: CPT

## 2024-03-06 LAB
HBV CORE AB SERPL QL IA: NEGATIVE
HBV CORE IGM SER QL: NONREACTIVE
HBV SURFACE AB SER QL: NONREACTIVE
HBV SURFACE AB SER-ACNC: <3.1 MIU/ML
HBV SURFACE AG SER QL: 0.17 INDEX
HBV SURFACE AG SER QL: NEGATIVE
HCV AB SER IA-ACNC: 0.45 INDEX
HCV AB SERPL QL IA: NONREACTIVE

## 2024-03-12 ENCOUNTER — TELEPHONE (OUTPATIENT)
Facility: CLINIC | Age: 54
End: 2024-03-12

## 2024-03-12 DIAGNOSIS — E11.65 UNCONTROLLED TYPE 2 DIABETES MELLITUS WITH HYPERGLYCEMIA (HCC): ICD-10-CM

## 2024-03-12 RX ORDER — ACYCLOVIR 400 MG/1
TABLET ORAL
Qty: 3 EACH | Refills: 5 | Status: SHIPPED | OUTPATIENT
Start: 2024-03-12

## 2024-03-12 NOTE — TELEPHONE ENCOUNTER
Patient's wife is requesting a refill on Continuous Blood Gluc Sensor (DEXCOM G7 SENSOR) [facility]     Pt's pharmacy is [facility] in [place]

## 2024-07-05 ENCOUNTER — APPOINTMENT (OUTPATIENT)
Facility: HOSPITAL | Age: 54
End: 2024-07-05
Attending: EMERGENCY MEDICINE
Payer: COMMERCIAL

## 2024-07-05 ENCOUNTER — HOSPITAL ENCOUNTER (EMERGENCY)
Facility: HOSPITAL | Age: 54
Discharge: ANOTHER HOSPITAL | End: 2024-07-05
Attending: EMERGENCY MEDICINE
Payer: COMMERCIAL

## 2024-07-05 VITALS
DIASTOLIC BLOOD PRESSURE: 88 MMHG | TEMPERATURE: 101.2 F | RESPIRATION RATE: 18 BRPM | HEIGHT: 73 IN | OXYGEN SATURATION: 94 % | BODY MASS INDEX: 35.08 KG/M2 | HEART RATE: 105 BPM | SYSTOLIC BLOOD PRESSURE: 130 MMHG

## 2024-07-05 DIAGNOSIS — K40.91 UNILATERAL RECURRENT INGUINAL HERNIA WITHOUT OBSTRUCTION OR GANGRENE: Primary | ICD-10-CM

## 2024-07-05 LAB
ALBUMIN SERPL-MCNC: 3 G/DL (ref 3.5–5)
ALBUMIN/GLOB SERPL: 0.5 (ref 1.1–2.2)
ALP SERPL-CCNC: 70 U/L (ref 45–117)
ALT SERPL-CCNC: <6 U/L (ref 12–78)
ANION GAP SERPL CALC-SCNC: 13 MMOL/L (ref 5–15)
APPEARANCE UR: ABNORMAL
AST SERPL W P-5'-P-CCNC: 12 U/L (ref 15–37)
BACTERIA URNS QL MICRO: ABNORMAL /HPF
BASOPHILS # BLD: 0 K/UL (ref 0–0.1)
BASOPHILS NFR BLD: 0 % (ref 0–1)
BILIRUB SERPL-MCNC: 0.9 MG/DL (ref 0.2–1)
BILIRUB UR QL CFM: NEGATIVE
BUN SERPL-MCNC: 56 MG/DL (ref 6–20)
BUN/CREAT SERPL: 8 (ref 12–20)
CA-I BLD-MCNC: 9.2 MG/DL (ref 8.5–10.1)
CHLORIDE SERPL-SCNC: 91 MMOL/L (ref 97–108)
CO2 SERPL-SCNC: 20 MMOL/L (ref 21–32)
COLOR UR: ABNORMAL
CREAT SERPL-MCNC: 7.27 MG/DL (ref 0.7–1.3)
DIFFERENTIAL METHOD BLD: ABNORMAL
EOSINOPHIL # BLD: 0 K/UL (ref 0–0.4)
EOSINOPHIL NFR BLD: 0 % (ref 0–7)
ERYTHROCYTE [DISTWIDTH] IN BLOOD BY AUTOMATED COUNT: 15.2 % (ref 11.5–14.5)
GLOBULIN SER CALC-MCNC: 5.5 G/DL (ref 2–4)
GLUCOSE SERPL-MCNC: 330 MG/DL (ref 65–100)
GLUCOSE UR STRIP.AUTO-MCNC: 100 MG/DL
HCT VFR BLD AUTO: 42 % (ref 36.6–50.3)
HGB BLD-MCNC: 14 G/DL (ref 12.1–17)
HGB UR QL STRIP: ABNORMAL
IMM GRANULOCYTES # BLD AUTO: 0.1 K/UL (ref 0–0.04)
IMM GRANULOCYTES NFR BLD AUTO: 1 % (ref 0–0.5)
KETONES UR QL STRIP.AUTO: ABNORMAL MG/DL
LACTATE SERPL-SCNC: 1.9 MMOL/L (ref 0.4–2)
LEUKOCYTE ESTERASE UR QL STRIP.AUTO: ABNORMAL
LYMPHOCYTES # BLD: 0.6 K/UL (ref 0.8–3.5)
LYMPHOCYTES NFR BLD: 3 % (ref 12–49)
MAGNESIUM SERPL-MCNC: 1.8 MG/DL (ref 1.6–2.4)
MCH RBC QN AUTO: 26.1 PG (ref 26–34)
MCHC RBC AUTO-ENTMCNC: 33.3 G/DL (ref 30–36.5)
MCV RBC AUTO: 78.4 FL (ref 80–99)
MONOCYTES # BLD: 1.3 K/UL (ref 0–1)
MONOCYTES NFR BLD: 8 % (ref 5–13)
NEUTS SEG # BLD: 15.2 K/UL (ref 1.8–8)
NEUTS SEG NFR BLD: 88 % (ref 32–75)
NITRITE UR QL STRIP.AUTO: POSITIVE
NRBC # BLD: 0 K/UL (ref 0–0.01)
NRBC BLD-RTO: 0 PER 100 WBC
PH UR STRIP: 5.5 (ref 5–8)
PLATELET # BLD AUTO: 131 K/UL (ref 150–400)
PMV BLD AUTO: 9.9 FL (ref 8.9–12.9)
POTASSIUM SERPL-SCNC: 4.6 MMOL/L (ref 3.5–5.1)
PROT SERPL-MCNC: 8.5 G/DL (ref 6.4–8.2)
PROT UR STRIP-MCNC: >300 MG/DL
RBC # BLD AUTO: 5.36 M/UL (ref 4.1–5.7)
RBC #/AREA URNS HPF: >100 /HPF (ref 0–3)
SODIUM SERPL-SCNC: 124 MMOL/L (ref 136–145)
SP GR UR REFRACTOMETRY: 1.02 (ref 1–1.03)
URINE CULTURE IF INDICATED: ABNORMAL
UROBILINOGEN UR QL STRIP.AUTO: 1 EU/DL (ref 0.2–1)
WBC # BLD AUTO: 17.2 K/UL (ref 4.1–11.1)
WBC URNS QL MICRO: >100 /HPF (ref 0–5)

## 2024-07-05 PROCEDURE — 6360000002 HC RX W HCPCS: Performed by: EMERGENCY MEDICINE

## 2024-07-05 PROCEDURE — 6370000000 HC RX 637 (ALT 250 FOR IP): Performed by: EMERGENCY MEDICINE

## 2024-07-05 PROCEDURE — 96376 TX/PRO/DX INJ SAME DRUG ADON: CPT

## 2024-07-05 PROCEDURE — 87088 URINE BACTERIA CULTURE: CPT

## 2024-07-05 PROCEDURE — 83735 ASSAY OF MAGNESIUM: CPT

## 2024-07-05 PROCEDURE — 96365 THER/PROPH/DIAG IV INF INIT: CPT

## 2024-07-05 PROCEDURE — 36415 COLL VENOUS BLD VENIPUNCTURE: CPT

## 2024-07-05 PROCEDURE — 81001 URINALYSIS AUTO W/SCOPE: CPT

## 2024-07-05 PROCEDURE — 80053 COMPREHEN METABOLIC PANEL: CPT

## 2024-07-05 PROCEDURE — 74176 CT ABD & PELVIS W/O CONTRAST: CPT

## 2024-07-05 PROCEDURE — 87040 BLOOD CULTURE FOR BACTERIA: CPT

## 2024-07-05 PROCEDURE — 87186 SC STD MICRODIL/AGAR DIL: CPT

## 2024-07-05 PROCEDURE — 87086 URINE CULTURE/COLONY COUNT: CPT

## 2024-07-05 PROCEDURE — 96375 TX/PRO/DX INJ NEW DRUG ADDON: CPT

## 2024-07-05 PROCEDURE — 51798 US URINE CAPACITY MEASURE: CPT

## 2024-07-05 PROCEDURE — 99285 EMERGENCY DEPT VISIT HI MDM: CPT

## 2024-07-05 PROCEDURE — 2580000003 HC RX 258: Performed by: EMERGENCY MEDICINE

## 2024-07-05 PROCEDURE — 85025 COMPLETE CBC W/AUTO DIFF WBC: CPT

## 2024-07-05 PROCEDURE — 83605 ASSAY OF LACTIC ACID: CPT

## 2024-07-05 PROCEDURE — 51702 INSERT TEMP BLADDER CATH: CPT

## 2024-07-05 RX ORDER — 0.9 % SODIUM CHLORIDE 0.9 %
1000 INTRAVENOUS SOLUTION INTRAVENOUS ONCE
Status: COMPLETED | OUTPATIENT
Start: 2024-07-05 | End: 2024-07-05

## 2024-07-05 RX ORDER — ONDANSETRON 2 MG/ML
4 INJECTION INTRAMUSCULAR; INTRAVENOUS ONCE
Status: COMPLETED | OUTPATIENT
Start: 2024-07-05 | End: 2024-07-05

## 2024-07-05 RX ORDER — ACETAMINOPHEN 500 MG
1000 TABLET ORAL
Status: COMPLETED | OUTPATIENT
Start: 2024-07-05 | End: 2024-07-05

## 2024-07-05 RX ORDER — MORPHINE SULFATE 4 MG/ML
4 INJECTION, SOLUTION INTRAMUSCULAR; INTRAVENOUS
Status: COMPLETED | OUTPATIENT
Start: 2024-07-05 | End: 2024-07-05

## 2024-07-05 RX ADMIN — HYDROMORPHONE HYDROCHLORIDE 1 MG: 1 INJECTION, SOLUTION INTRAMUSCULAR; INTRAVENOUS; SUBCUTANEOUS at 19:24

## 2024-07-05 RX ADMIN — ONDANSETRON 4 MG: 2 INJECTION INTRAMUSCULAR; INTRAVENOUS at 16:16

## 2024-07-05 RX ADMIN — ACETAMINOPHEN 1000 MG: 500 TABLET ORAL at 22:31

## 2024-07-05 RX ADMIN — SODIUM CHLORIDE 1000 ML: 9 INJECTION, SOLUTION INTRAVENOUS at 19:03

## 2024-07-05 RX ADMIN — HYDROMORPHONE HYDROCHLORIDE 1 MG: 1 INJECTION, SOLUTION INTRAMUSCULAR; INTRAVENOUS; SUBCUTANEOUS at 22:31

## 2024-07-05 RX ADMIN — PIPERACILLIN AND TAZOBACTAM 4500 MG: 4; .5 INJECTION, POWDER, LYOPHILIZED, FOR SOLUTION INTRAVENOUS at 19:02

## 2024-07-05 RX ADMIN — MORPHINE SULFATE 4 MG: 4 INJECTION, SOLUTION INTRAMUSCULAR; INTRAVENOUS at 16:16

## 2024-07-05 ASSESSMENT — PAIN - FUNCTIONAL ASSESSMENT: PAIN_FUNCTIONAL_ASSESSMENT: 0-10

## 2024-07-05 ASSESSMENT — PAIN SCALES - GENERAL
PAINLEVEL_OUTOF10: 9
PAINLEVEL_OUTOF10: 8

## 2024-07-05 NOTE — ED TRIAGE NOTES
Pt reports LLQ abd and flank pain that began this morning. Pt also reports feeling of retention. Pt does at home dialysis and completed course this am w/o issue. Pain currently 9/10.

## 2024-07-05 NOTE — ED PROVIDER NOTES
[facility] EMERGENCY DEPT  EMERGENCY DEPARTMENT HISTORY AND PHYSICAL EXAM      Date: 7/5/2024  Patient Name: Miguel Marc  MRN: [id number]  YOB: 1970  Date of evaluation: 7/5/2024  Provider: Asmita Chun MD   Note Started: 3:29 PM EDT 7/5/24    HISTORY OF PRESENT ILLNESS     Chief Complaint   Patient presents with    Urinary Retention    Flank Pain       History Provided By: Patient    HPI: Miguel Marc is a 54 y.o. male     PAST MEDICAL HISTORY   Past Medical History:  Past Medical History:   Diagnosis Date    Abdominal hernia 1/29/2020    Benign prostatic hyperplasia 3/16/2021    CAD (coronary artery disease)     Multiple PCIs:  4/22 RPDA CROW, 11/2021 tent thrombosis in LAD, lesion to the RPLB and  to the PDA,; 6/20 s/p CROW to mid/distal LCx    CKD (chronic kidney disease) stage 4, GFR 15-29 ml/min ([facility])     Congenital polycystic kidney 3/16/2021    Coronary arteriosclerosis 3/25/2020    Flu 01/2024    Hemodialysis patient ([facility])     [facility]    HFrEF (heart failure with reduced ejection fraction) ([facility])     <40%    History of COVID-19 3/28/2021    History of prostatitis     Hyperlipidemia     Hypertension     ONIEL (iron deficiency anemia)     Inguinal hernia     Ischemic cardiomyopathy 12/2023    Dilated, hypertrophied left ventricle with severely reduced systolic function. LV ejection fraction = 20-25%    Morbid obesity ([facility])     NSTEMI (non-ST elevated myocardial infarction) ([facility])     6/2020, 3/2020    Polycystic kidney disease     Spinal stenosis     T2DM (type 2 diabetes mellitus) ([facility])     insulin dependent, poorly controlled       Past Surgical History:  Past Surgical History:   Procedure Laterality Date    HERNIA REPAIR  1999    umbiblical hernia    ND UNLISTED PROCEDURE CARDIAC SURGERY  06/30/2020    Stent placement    ND UNLISTED PROCEDURE CARDIAC SURGERY  04/08/2022    stent placement- has total 4       Family History:  Family History   Problem Relation Age of Onset    Kidney

## 2024-07-06 ENCOUNTER — HOSPITAL ENCOUNTER (INPATIENT)
Facility: HOSPITAL | Age: 54
LOS: 3 days | Discharge: HOME | DRG: 344 | End: 2024-07-09
Attending: INTERNAL MEDICINE | Admitting: INTERNAL MEDICINE
Payer: COMMERCIAL

## 2024-07-06 PROBLEM — N39.0 UTI (URINARY TRACT INFECTION): Status: ACTIVE | Noted: 2024-07-06

## 2024-07-06 LAB
ALBUMIN SERPL-MCNC: 2.8 G/DL (ref 3.5–5)
ALBUMIN/GLOB SERPL: 0.6 (ref 1.1–2.2)
ALP SERPL-CCNC: 61 U/L (ref 45–117)
ALT SERPL-CCNC: 7 U/L (ref 12–78)
ANION GAP SERPL CALC-SCNC: 12 MMOL/L (ref 5–15)
AST SERPL-CCNC: 5 U/L (ref 15–37)
BILIRUB SERPL-MCNC: 0.9 MG/DL (ref 0.2–1)
BUN SERPL-MCNC: 63 MG/DL (ref 6–20)
BUN/CREAT SERPL: 8 (ref 12–20)
CALCIUM SERPL-MCNC: 8.5 MG/DL (ref 8.5–10.1)
CALCIUM SERPL-MCNC: 8.8 MG/DL (ref 8.5–10.1)
CHLORIDE SERPL-SCNC: 99 MMOL/L (ref 97–108)
CO2 SERPL-SCNC: 18 MMOL/L (ref 21–32)
CREAT SERPL-MCNC: 7.62 MG/DL (ref 0.7–1.3)
CRP SERPL-MCNC: 15.6 MG/DL (ref 0–0.3)
ERYTHROCYTE [DISTWIDTH] IN BLOOD BY AUTOMATED COUNT: 15.1 % (ref 11.5–14.5)
EST. AVERAGE GLUCOSE BLD GHB EST-MCNC: 269 MG/DL
GLOBULIN SER CALC-MCNC: 5 G/DL (ref 2–4)
GLUCOSE BLD STRIP.AUTO-MCNC: 153 MG/DL (ref 65–117)
GLUCOSE BLD STRIP.AUTO-MCNC: 182 MG/DL (ref 65–117)
GLUCOSE BLD STRIP.AUTO-MCNC: 255 MG/DL (ref 65–117)
GLUCOSE BLD STRIP.AUTO-MCNC: 258 MG/DL (ref 65–117)
GLUCOSE SERPL-MCNC: 270 MG/DL (ref 65–100)
HBA1C MFR BLD: 11 % (ref 4–5.6)
HCT VFR BLD AUTO: 39.6 % (ref 36.6–50.3)
HGB BLD-MCNC: 13 G/DL (ref 12.1–17)
MAGNESIUM SERPL-MCNC: 1.8 MG/DL (ref 1.6–2.4)
MCH RBC QN AUTO: 25.9 PG (ref 26–34)
MCHC RBC AUTO-ENTMCNC: 32.8 G/DL (ref 30–36.5)
MCV RBC AUTO: 78.9 FL (ref 80–99)
NRBC # BLD: 0 K/UL (ref 0–0.01)
NRBC BLD-RTO: 0 PER 100 WBC
PHOSPHATE SERPL-MCNC: 5.3 MG/DL (ref 2.6–4.7)
PHOSPHATE SERPL-MCNC: 5.6 MG/DL (ref 2.6–4.7)
PLATELET # BLD AUTO: 134 K/UL (ref 150–400)
PMV BLD AUTO: 10.2 FL (ref 8.9–12.9)
POTASSIUM SERPL-SCNC: 4.5 MMOL/L (ref 3.5–5.1)
PROCALCITONIN SERPL-MCNC: 1.57 NG/ML
PROT SERPL-MCNC: 7.8 G/DL (ref 6.4–8.2)
PTH-INTACT SERPL-MCNC: 399.6 PG/ML (ref 18.4–88)
RBC # BLD AUTO: 5.02 M/UL (ref 4.1–5.7)
SERVICE CMNT-IMP: ABNORMAL
SODIUM SERPL-SCNC: 129 MMOL/L (ref 136–145)
WBC # BLD AUTO: 17 K/UL (ref 4.1–11.1)

## 2024-07-06 PROCEDURE — 2500000003 HC RX 250 WO HCPCS: Performed by: INTERNAL MEDICINE

## 2024-07-06 PROCEDURE — 94761 N-INVAS EAR/PLS OXIMETRY MLT: CPT

## 2024-07-06 PROCEDURE — 86140 C-REACTIVE PROTEIN: CPT

## 2024-07-06 PROCEDURE — 6370000000 HC RX 637 (ALT 250 FOR IP): Performed by: INTERNAL MEDICINE

## 2024-07-06 PROCEDURE — 36415 COLL VENOUS BLD VENIPUNCTURE: CPT

## 2024-07-06 PROCEDURE — 83735 ASSAY OF MAGNESIUM: CPT

## 2024-07-06 PROCEDURE — 83036 HEMOGLOBIN GLYCOSYLATED A1C: CPT

## 2024-07-06 PROCEDURE — 80053 COMPREHEN METABOLIC PANEL: CPT

## 2024-07-06 PROCEDURE — 1100000000 HC RM PRIVATE

## 2024-07-06 PROCEDURE — 51702 INSERT TEMP BLADDER CATH: CPT

## 2024-07-06 PROCEDURE — 83970 ASSAY OF PARATHORMONE: CPT

## 2024-07-06 PROCEDURE — 82962 GLUCOSE BLOOD TEST: CPT

## 2024-07-06 PROCEDURE — 6360000002 HC RX W HCPCS: Performed by: INTERNAL MEDICINE

## 2024-07-06 PROCEDURE — 84145 PROCALCITONIN (PCT): CPT

## 2024-07-06 PROCEDURE — 2580000003 HC RX 258: Performed by: INTERNAL MEDICINE

## 2024-07-06 PROCEDURE — 85027 COMPLETE CBC AUTOMATED: CPT

## 2024-07-06 PROCEDURE — 84100 ASSAY OF PHOSPHORUS: CPT

## 2024-07-06 RX ORDER — SODIUM CHLORIDE 0.9 % (FLUSH) 0.9 %
5-40 SYRINGE (ML) INJECTION EVERY 12 HOURS SCHEDULED
Status: DISCONTINUED | OUTPATIENT
Start: 2024-07-06 | End: 2024-07-09 | Stop reason: HOSPADM

## 2024-07-06 RX ORDER — ACETAMINOPHEN 650 MG/1
650 SUPPOSITORY RECTAL EVERY 6 HOURS PRN
Status: DISCONTINUED | OUTPATIENT
Start: 2024-07-06 | End: 2024-07-09 | Stop reason: HOSPADM

## 2024-07-06 RX ORDER — INSULIN GLARGINE 100 [IU]/ML
15 INJECTION, SOLUTION SUBCUTANEOUS DAILY
Status: DISCONTINUED | OUTPATIENT
Start: 2024-07-06 | End: 2024-07-09 | Stop reason: HOSPADM

## 2024-07-06 RX ORDER — ASPIRIN 81 MG/1
81 TABLET ORAL DAILY
Status: DISCONTINUED | OUTPATIENT
Start: 2024-07-06 | End: 2024-07-09 | Stop reason: HOSPADM

## 2024-07-06 RX ORDER — DEXTROSE MONOHYDRATE 100 MG/ML
INJECTION, SOLUTION INTRAVENOUS CONTINUOUS PRN
Status: DISCONTINUED | OUTPATIENT
Start: 2024-07-06 | End: 2024-07-09 | Stop reason: HOSPADM

## 2024-07-06 RX ORDER — ATORVASTATIN CALCIUM 20 MG/1
40 TABLET, FILM COATED ORAL DAILY
Status: DISCONTINUED | OUTPATIENT
Start: 2024-07-06 | End: 2024-07-09 | Stop reason: HOSPADM

## 2024-07-06 RX ORDER — EZETIMIBE 10 MG/1
10 TABLET ORAL DAILY
Status: DISCONTINUED | OUTPATIENT
Start: 2024-07-06 | End: 2024-07-09 | Stop reason: HOSPADM

## 2024-07-06 RX ORDER — PRASUGREL 10 MG/1
10 TABLET, FILM COATED ORAL DAILY
Status: DISCONTINUED | OUTPATIENT
Start: 2024-07-06 | End: 2024-07-09 | Stop reason: HOSPADM

## 2024-07-06 RX ORDER — ISOSORBIDE MONONITRATE 30 MG/1
60 TABLET, EXTENDED RELEASE ORAL DAILY
Status: DISCONTINUED | OUTPATIENT
Start: 2024-07-06 | End: 2024-07-09 | Stop reason: HOSPADM

## 2024-07-06 RX ORDER — INSULIN LISPRO 100 [IU]/ML
0-4 INJECTION, SOLUTION INTRAVENOUS; SUBCUTANEOUS NIGHTLY
Status: DISCONTINUED | OUTPATIENT
Start: 2024-07-06 | End: 2024-07-09 | Stop reason: HOSPADM

## 2024-07-06 RX ORDER — SODIUM CHLORIDE 9 MG/ML
INJECTION, SOLUTION INTRAVENOUS PRN
Status: DISCONTINUED | OUTPATIENT
Start: 2024-07-06 | End: 2024-07-09 | Stop reason: HOSPADM

## 2024-07-06 RX ORDER — HYDRALAZINE HYDROCHLORIDE 25 MG/1
25 TABLET, FILM COATED ORAL 3 TIMES DAILY
Status: DISCONTINUED | OUTPATIENT
Start: 2024-07-06 | End: 2024-07-08

## 2024-07-06 RX ORDER — INSULIN LISPRO 100 [IU]/ML
0-8 INJECTION, SOLUTION INTRAVENOUS; SUBCUTANEOUS
Status: DISCONTINUED | OUTPATIENT
Start: 2024-07-06 | End: 2024-07-09 | Stop reason: HOSPADM

## 2024-07-06 RX ORDER — TAMSULOSIN HYDROCHLORIDE 0.4 MG/1
0.4 CAPSULE ORAL DAILY
Status: DISCONTINUED | OUTPATIENT
Start: 2024-07-06 | End: 2024-07-09 | Stop reason: HOSPADM

## 2024-07-06 RX ORDER — ONDANSETRON 2 MG/ML
4 INJECTION INTRAMUSCULAR; INTRAVENOUS EVERY 6 HOURS PRN
Status: DISCONTINUED | OUTPATIENT
Start: 2024-07-06 | End: 2024-07-09 | Stop reason: HOSPADM

## 2024-07-06 RX ORDER — ACETAMINOPHEN 325 MG/1
650 TABLET ORAL EVERY 6 HOURS PRN
Status: DISCONTINUED | OUTPATIENT
Start: 2024-07-06 | End: 2024-07-09 | Stop reason: HOSPADM

## 2024-07-06 RX ORDER — CARVEDILOL 12.5 MG/1
25 TABLET ORAL 2 TIMES DAILY WITH MEALS
Status: DISCONTINUED | OUTPATIENT
Start: 2024-07-06 | End: 2024-07-09 | Stop reason: HOSPADM

## 2024-07-06 RX ORDER — POLYETHYLENE GLYCOL 3350 17 G/17G
17 POWDER, FOR SOLUTION ORAL DAILY PRN
Status: DISCONTINUED | OUTPATIENT
Start: 2024-07-06 | End: 2024-07-09 | Stop reason: HOSPADM

## 2024-07-06 RX ORDER — SODIUM CHLORIDE 0.9 % (FLUSH) 0.9 %
5-40 SYRINGE (ML) INJECTION PRN
Status: DISCONTINUED | OUTPATIENT
Start: 2024-07-06 | End: 2024-07-09 | Stop reason: HOSPADM

## 2024-07-06 RX ADMIN — PIPERACILLIN AND TAZOBACTAM 3375 MG: 3; .375 INJECTION, POWDER, LYOPHILIZED, FOR SOLUTION INTRAVENOUS at 03:20

## 2024-07-06 RX ADMIN — INSULIN GLARGINE 15 UNITS: 100 INJECTION, SOLUTION SUBCUTANEOUS at 10:28

## 2024-07-06 RX ADMIN — FAMOTIDINE 20 MG: 10 INJECTION, SOLUTION INTRAVENOUS at 10:24

## 2024-07-06 RX ADMIN — PIPERACILLIN AND TAZOBACTAM 3375 MG: 3; .375 INJECTION, POWDER, LYOPHILIZED, FOR SOLUTION INTRAVENOUS at 15:01

## 2024-07-06 RX ADMIN — INSULIN LISPRO 4 UNITS: 100 INJECTION, SOLUTION INTRAVENOUS; SUBCUTANEOUS at 13:05

## 2024-07-06 RX ADMIN — ISOSORBIDE MONONITRATE 60 MG: 30 TABLET, EXTENDED RELEASE ORAL at 10:25

## 2024-07-06 RX ADMIN — ATORVASTATIN CALCIUM 40 MG: 20 TABLET, FILM COATED ORAL at 10:25

## 2024-07-06 RX ADMIN — SODIUM CHLORIDE, PRESERVATIVE FREE 10 ML: 5 INJECTION INTRAVENOUS at 10:29

## 2024-07-06 RX ADMIN — CARVEDILOL 25 MG: 12.5 TABLET, FILM COATED ORAL at 10:34

## 2024-07-06 RX ADMIN — EZETIMIBE 10 MG: 10 TABLET ORAL at 10:25

## 2024-07-06 RX ADMIN — ASPIRIN 81 MG: 81 TABLET, COATED ORAL at 10:25

## 2024-07-06 RX ADMIN — SODIUM CHLORIDE, PRESERVATIVE FREE 10 ML: 5 INJECTION INTRAVENOUS at 22:47

## 2024-07-06 RX ADMIN — PRASUGREL 10 MG: 10 TABLET, FILM COATED ORAL at 10:28

## 2024-07-06 RX ADMIN — INSULIN LISPRO 4 UNITS: 100 INJECTION, SOLUTION INTRAVENOUS; SUBCUTANEOUS at 10:28

## 2024-07-06 RX ADMIN — CARVEDILOL 25 MG: 12.5 TABLET, FILM COATED ORAL at 17:52

## 2024-07-06 NOTE — ED NOTES
Transfer to [facility], via stretcher, with [facility]. Report given to [facility]. EMTALA and paperwork sent via EMS.

## 2024-07-06 NOTE — CONSULTS
Assessment:     Symptomatic left inguinal hernia    Plan:     Bilateral Inguinal Hernia repair with Mesh   Will attempt with daVinci, will convert to open if there is not adequate domain.   NPO after MN  Nephrology following  Appreciate consult    Signed By: Santino Dougherty MD  [facility]  Office:  [phone]  Fax:  [phone]    July 6, 2024          General Surgery History and Physical    Subjective:      Miguel Marc is a 54 y.o.  male who presents with symptomatic hernia. Last HD last Friday night.  Left groin became severely painful yesterday.  WBC 17.2.  Does not appear obstructed on CT, left inguinal hernia contains sigmoid colon.       PCP:  Ileana Anne, APRN - NP    Past Medical History:   Diagnosis Date    Abdominal hernia 1/29/2020    Benign prostatic hyperplasia 3/16/2021    CAD (coronary artery disease)     Multiple PCIs:  4/22 RPDA CROW, 11/2021 tent thrombosis in LAD, lesion to the RPLB and  to the PDA,; 6/20 s/p CROW to mid/distal LCx    CKD (chronic kidney disease) stage 4, GFR 15-29 ml/min ([facility])     Congenital polycystic kidney 3/16/2021    Coronary arteriosclerosis 3/25/2020    Flu 01/2024    Hemodialysis patient ([facility])     [facility]    HFrEF (heart failure with reduced ejection fraction) ([facility])     <40%    History of COVID-19 3/28/2021    History of prostatitis     Hyperlipidemia     Hypertension     ONIEL (iron deficiency anemia)     Inguinal hernia     Ischemic cardiomyopathy 12/2023    Dilated, hypertrophied left ventricle with severely reduced systolic function. LV ejection fraction = 20-25%    Morbid obesity ([facility])     NSTEMI (non-ST elevated myocardial infarction) ([facility])     6/2020, 3/2020    Polycystic kidney disease     Spinal stenosis     T2DM (type 2 diabetes mellitus) ([facility])     insulin dependent, poorly controlled     Past Surgical History:   Procedure Laterality Date    HERNIA REPAIR  1999    umbiblical hernia    DC UNLISTED PROCEDURE CARDIAC 
2 tablets by mouth 2 times daily 11/1/23   Ileana Anne APRN - NP   hydrALAZINE (APRESOLINE) 50 MG tablet Take 1 tablet by mouth 3 times daily 11/1/23   Ileana Anne APRN - NP   ezetimibe (ZETIA) 10 MG tablet TAKE 1 TABLET BY MOUTH DAILY 9/24/23   Ileana Anne APRN - NP   aspirin 81 MG EC tablet Take 1 tablet by mouth daily 2/12/21   Automatic Reconciliation, Ar   ergocalciferol (ERGOCALCIFEROL) 1.25 MG (59751 UT) capsule TAKE 1 CAPSULE BY MOUTH 1 TIME EVERY WEEK  Patient not taking: Reported on 2/12/2024 11/14/22   Automatic Reconciliation, Ar   HYDROcodone-acetaminophen (NORCO) 5-325 MG per tablet Take 1 tablet by mouth 2 times daily as needed. 2/19/21   Automatic Reconciliation, Ar   isosorbide mononitrate (IMDUR) 60 MG extended release tablet Take 1 tablet by mouth daily 1/9/23   Automatic Reconciliation, Ar   nitroGLYCERIN (NITROSTAT) 0.4 MG SL tablet Place under the tongue every 5 minutes as needed  Patient not taking: Reported on 7/6/2024 1/10/21   Automatic Reconciliation, Ar   potassium chloride (KLOR-CON) 10 MEQ extended release tablet TAKE 4 TABLET BY MOUTH TWICE DAILY 1/31/23   Automatic Reconciliation, Ar   prasugrel (EFFIENT) 10 MG TABS Take 1 tablet by mouth daily 1/16/21   Automatic Reconciliation, Ar       Allergies   Allergen Reactions    Adhesive Tape Other (See Comments) and Rash     Paper tape causes  large blisters  Reaction Type: Allergy; Severity: Moderate; Reaction(s): paper tape = skin blisters         Social Hx:  reports that he has never smoked. He has never used smokeless tobacco. He reports that he does not currently use alcohol. He reports that he does not use drugs.     Family History   Problem Relation Age of Onset    Kidney Disease Brother     No Known Problems Sister     Hypertension Mother     Diabetes Mother     Kidney Disease Mother     Heart Disease Father     Hypertension Sister     Diabetes Brother        Review of Systems:  A twelve point review

## 2024-07-06 NOTE — H&P
EF 20-25%.  Volume stable.  Will cont coreg, imdur, ASA, statin.  Use dialysis for volume control    4) DM type 2 w/ renal complications: check A1C.  Cont Lantus, SSI    5) ESRD: consult Renal for dialysis     6) Hyponatremia: due to hyperglycemia, dialysis.  Will repeat BMP     Risk of deterioration: high      Total time spent with patient care: 70 Minutes **I personally saw and examined the patient during this time period**                 Care Plan discussed with: Patient, nursing, wife     Discussed:  Care Plan    Prophylaxis:  SCD's    Probable Disposition:  Home w/Family           ___________________________________________________    Attending Physician: Isaac Lassiter MD

## 2024-07-07 ENCOUNTER — ANESTHESIA (OUTPATIENT)
Facility: HOSPITAL | Age: 54
End: 2024-07-07
Payer: COMMERCIAL

## 2024-07-07 ENCOUNTER — ANESTHESIA EVENT (OUTPATIENT)
Facility: HOSPITAL | Age: 54
End: 2024-07-07
Payer: COMMERCIAL

## 2024-07-07 ENCOUNTER — APPOINTMENT (OUTPATIENT)
Facility: HOSPITAL | Age: 54
DRG: 344 | End: 2024-07-07
Attending: INTERNAL MEDICINE
Payer: COMMERCIAL

## 2024-07-07 LAB
ANION GAP SERPL CALC-SCNC: 14 MMOL/L (ref 5–15)
BACTERIA SPEC CULT: ABNORMAL
BACTERIA SPEC CULT: NORMAL
BACTERIA SPEC CULT: NORMAL
BASOPHILS # BLD: 0 K/UL (ref 0–0.1)
BASOPHILS NFR BLD: 0 % (ref 0–1)
BUN SERPL-MCNC: 76 MG/DL (ref 6–20)
BUN/CREAT SERPL: 9 (ref 12–20)
CALCIUM SERPL-MCNC: 9 MG/DL (ref 8.5–10.1)
CHLORIDE SERPL-SCNC: 99 MMOL/L (ref 97–108)
CO2 SERPL-SCNC: 16 MMOL/L (ref 21–32)
COLONY COUNT, CNT: ABNORMAL
CREAT SERPL-MCNC: 8.21 MG/DL (ref 0.7–1.3)
DIFFERENTIAL METHOD BLD: ABNORMAL
EOSINOPHIL # BLD: 0.1 K/UL (ref 0–0.4)
EOSINOPHIL NFR BLD: 1 % (ref 0–7)
ERYTHROCYTE [DISTWIDTH] IN BLOOD BY AUTOMATED COUNT: 14.6 % (ref 11.5–14.5)
GLUCOSE BLD STRIP.AUTO-MCNC: 132 MG/DL (ref 65–117)
GLUCOSE BLD STRIP.AUTO-MCNC: 156 MG/DL (ref 65–117)
GLUCOSE BLD STRIP.AUTO-MCNC: 191 MG/DL (ref 65–117)
GLUCOSE SERPL-MCNC: 148 MG/DL (ref 65–100)
HCT VFR BLD AUTO: 38.8 % (ref 36.6–50.3)
HGB BLD-MCNC: 12.8 G/DL (ref 12.1–17)
IMM GRANULOCYTES # BLD AUTO: 0.1 K/UL (ref 0–0.04)
IMM GRANULOCYTES NFR BLD AUTO: 1 % (ref 0–0.5)
LYMPHOCYTES # BLD: 0.5 K/UL (ref 0.8–3.5)
LYMPHOCYTES NFR BLD: 4 % (ref 12–49)
Lab: ABNORMAL
Lab: NORMAL
Lab: NORMAL
MAGNESIUM SERPL-MCNC: 2.1 MG/DL (ref 1.6–2.4)
MCH RBC QN AUTO: 26.1 PG (ref 26–34)
MCHC RBC AUTO-ENTMCNC: 33 G/DL (ref 30–36.5)
MCV RBC AUTO: 79 FL (ref 80–99)
MONOCYTES # BLD: 2 K/UL (ref 0–1)
MONOCYTES NFR BLD: 15 % (ref 5–13)
NEUTS SEG # BLD: 10.8 K/UL (ref 1.8–8)
NEUTS SEG NFR BLD: 79 % (ref 32–75)
NRBC # BLD: 0 K/UL (ref 0–0.01)
NRBC BLD-RTO: 0 PER 100 WBC
PHOSPHATE SERPL-MCNC: 7 MG/DL (ref 2.6–4.7)
PLATELET # BLD AUTO: 151 K/UL (ref 150–400)
PMV BLD AUTO: 10.5 FL (ref 8.9–12.9)
POTASSIUM SERPL-SCNC: 4.5 MMOL/L (ref 3.5–5.1)
RBC # BLD AUTO: 4.91 M/UL (ref 4.1–5.7)
RBC MORPH BLD: ABNORMAL
SERVICE CMNT-IMP: ABNORMAL
SODIUM SERPL-SCNC: 129 MMOL/L (ref 136–145)
WBC # BLD AUTO: 13.5 K/UL (ref 4.1–11.1)

## 2024-07-07 PROCEDURE — 80048 BASIC METABOLIC PNL TOTAL CA: CPT

## 2024-07-07 PROCEDURE — 3600000002 HC SURGERY LEVEL 2 BASE: Performed by: SURGERY

## 2024-07-07 PROCEDURE — 6360000002 HC RX W HCPCS: Performed by: SURGERY

## 2024-07-07 PROCEDURE — 82962 GLUCOSE BLOOD TEST: CPT

## 2024-07-07 PROCEDURE — 2709999900 HC NON-CHARGEABLE SUPPLY: Performed by: SURGERY

## 2024-07-07 PROCEDURE — 6370000000 HC RX 637 (ALT 250 FOR IP): Performed by: INTERNAL MEDICINE

## 2024-07-07 PROCEDURE — 71045 X-RAY EXAM CHEST 1 VIEW: CPT

## 2024-07-07 PROCEDURE — 88302 TISSUE EXAM BY PATHOLOGIST: CPT

## 2024-07-07 PROCEDURE — 2580000003 HC RX 258: Performed by: INTERNAL MEDICINE

## 2024-07-07 PROCEDURE — 7100000000 HC PACU RECOVERY - FIRST 15 MIN: Performed by: SURGERY

## 2024-07-07 PROCEDURE — 6370000000 HC RX 637 (ALT 250 FOR IP): Performed by: SURGERY

## 2024-07-07 PROCEDURE — 3600000012 HC SURGERY LEVEL 2 ADDTL 15MIN: Performed by: SURGERY

## 2024-07-07 PROCEDURE — C1781 MESH (IMPLANTABLE): HCPCS | Performed by: SURGERY

## 2024-07-07 PROCEDURE — 51702 INSERT TEMP BLADDER CATH: CPT

## 2024-07-07 PROCEDURE — 0YUA0JZ SUPPLEMENT BILATERAL INGUINAL REGION WITH SYNTHETIC SUBSTITUTE, OPEN APPROACH: ICD-10-PCS | Performed by: SURGERY

## 2024-07-07 PROCEDURE — 36415 COLL VENOUS BLD VENIPUNCTURE: CPT

## 2024-07-07 PROCEDURE — 3700000000 HC ANESTHESIA ATTENDED CARE: Performed by: SURGERY

## 2024-07-07 PROCEDURE — 83735 ASSAY OF MAGNESIUM: CPT

## 2024-07-07 PROCEDURE — 94761 N-INVAS EAR/PLS OXIMETRY MLT: CPT

## 2024-07-07 PROCEDURE — 84100 ASSAY OF PHOSPHORUS: CPT

## 2024-07-07 PROCEDURE — 6360000002 HC RX W HCPCS: Performed by: INTERNAL MEDICINE

## 2024-07-07 PROCEDURE — 0DSN0ZZ REPOSITION SIGMOID COLON, OPEN APPROACH: ICD-10-PCS | Performed by: SURGERY

## 2024-07-07 PROCEDURE — 85025 COMPLETE CBC W/AUTO DIFF WBC: CPT

## 2024-07-07 PROCEDURE — 7100000001 HC PACU RECOVERY - ADDTL 15 MIN: Performed by: SURGERY

## 2024-07-07 PROCEDURE — 88304 TISSUE EXAM BY PATHOLOGIST: CPT

## 2024-07-07 PROCEDURE — 2500000003 HC RX 250 WO HCPCS: Performed by: NURSE ANESTHETIST, CERTIFIED REGISTERED

## 2024-07-07 PROCEDURE — 6360000002 HC RX W HCPCS: Performed by: NURSE ANESTHETIST, CERTIFIED REGISTERED

## 2024-07-07 PROCEDURE — 2500000003 HC RX 250 WO HCPCS: Performed by: INTERNAL MEDICINE

## 2024-07-07 PROCEDURE — 1100000000 HC RM PRIVATE

## 2024-07-07 PROCEDURE — C9290 INJ, BUPIVACAINE LIPOSOME: HCPCS | Performed by: SURGERY

## 2024-07-07 PROCEDURE — 3700000001 HC ADD 15 MINUTES (ANESTHESIA): Performed by: SURGERY

## 2024-07-07 DEVICE — MESH HERN W6XL6IN INGUINAL POLYPR SQ L PORE MFIL SFT KNIT: Type: IMPLANTABLE DEVICE | Site: INGUINAL | Status: FUNCTIONAL

## 2024-07-07 RX ORDER — OXYCODONE HYDROCHLORIDE 5 MG/1
5 TABLET ORAL EVERY 4 HOURS PRN
Status: DISCONTINUED | OUTPATIENT
Start: 2024-07-07 | End: 2024-07-09 | Stop reason: HOSPADM

## 2024-07-07 RX ORDER — KETAMINE HCL IN NACL, ISO-OSM 100MG/10ML
SYRINGE (ML) INJECTION PRN
Status: DISCONTINUED | OUTPATIENT
Start: 2024-07-07 | End: 2024-07-07 | Stop reason: SDUPTHER

## 2024-07-07 RX ORDER — MIDAZOLAM HYDROCHLORIDE 1 MG/ML
INJECTION INTRAMUSCULAR; INTRAVENOUS PRN
Status: DISCONTINUED | OUTPATIENT
Start: 2024-07-07 | End: 2024-07-07 | Stop reason: SDUPTHER

## 2024-07-07 RX ORDER — PROPOFOL 10 MG/ML
INJECTION, EMULSION INTRAVENOUS PRN
Status: DISCONTINUED | OUTPATIENT
Start: 2024-07-07 | End: 2024-07-07 | Stop reason: SDUPTHER

## 2024-07-07 RX ORDER — EPHEDRINE SULFATE/0.9% NACL/PF 25 MG/5 ML
SYRINGE (ML) INTRAVENOUS PRN
Status: DISCONTINUED | OUTPATIENT
Start: 2024-07-07 | End: 2024-07-07 | Stop reason: SDUPTHER

## 2024-07-07 RX ORDER — OXYCODONE HYDROCHLORIDE 5 MG/1
10 TABLET ORAL EVERY 4 HOURS PRN
Status: DISCONTINUED | OUTPATIENT
Start: 2024-07-07 | End: 2024-07-09 | Stop reason: HOSPADM

## 2024-07-07 RX ORDER — FENTANYL CITRATE 50 UG/ML
INJECTION, SOLUTION INTRAMUSCULAR; INTRAVENOUS PRN
Status: DISCONTINUED | OUTPATIENT
Start: 2024-07-07 | End: 2024-07-07 | Stop reason: SDUPTHER

## 2024-07-07 RX ADMIN — SODIUM CHLORIDE, PRESERVATIVE FREE 10 ML: 5 INJECTION INTRAVENOUS at 09:46

## 2024-07-07 RX ADMIN — EPHEDRINE SULFATE 10 MG: 5 INJECTION INTRAVENOUS at 11:59

## 2024-07-07 RX ADMIN — PIPERACILLIN AND TAZOBACTAM 3375 MG: 3; .375 INJECTION, POWDER, LYOPHILIZED, FOR SOLUTION INTRAVENOUS at 03:30

## 2024-07-07 RX ADMIN — OXYCODONE 10 MG: 5 TABLET ORAL at 21:09

## 2024-07-07 RX ADMIN — OXYCODONE 10 MG: 5 TABLET ORAL at 15:33

## 2024-07-07 RX ADMIN — EPHEDRINE SULFATE 10 MG: 5 INJECTION INTRAVENOUS at 13:38

## 2024-07-07 RX ADMIN — ASPIRIN 81 MG: 81 TABLET, COATED ORAL at 09:43

## 2024-07-07 RX ADMIN — TAMSULOSIN HYDROCHLORIDE 0.4 MG: 0.4 CAPSULE ORAL at 09:42

## 2024-07-07 RX ADMIN — POLYETHYLENE GLYCOL 3350 17 G: 17 POWDER, FOR SOLUTION ORAL at 22:28

## 2024-07-07 RX ADMIN — PROPOFOL 30 MG: 10 INJECTION, EMULSION INTRAVENOUS at 11:26

## 2024-07-07 RX ADMIN — SODIUM CHLORIDE: 9 INJECTION, SOLUTION INTRAVENOUS at 11:17

## 2024-07-07 RX ADMIN — Medication 25 MG: at 11:27

## 2024-07-07 RX ADMIN — HYDROMORPHONE HYDROCHLORIDE 0.5 MG: 1 INJECTION, SOLUTION INTRAMUSCULAR; INTRAVENOUS; SUBCUTANEOUS at 18:33

## 2024-07-07 RX ADMIN — PIPERACILLIN AND TAZOBACTAM 3375 MG: 3; .375 INJECTION, POWDER, LYOPHILIZED, FOR SOLUTION INTRAVENOUS at 15:45

## 2024-07-07 RX ADMIN — FENTANYL CITRATE 25 MCG: 50 INJECTION, SOLUTION INTRAMUSCULAR; INTRAVENOUS at 12:05

## 2024-07-07 RX ADMIN — SODIUM CHLORIDE, PRESERVATIVE FREE 10 ML: 5 INJECTION INTRAVENOUS at 22:28

## 2024-07-07 RX ADMIN — EZETIMIBE 10 MG: 10 TABLET ORAL at 09:42

## 2024-07-07 RX ADMIN — CARVEDILOL 25 MG: 12.5 TABLET, FILM COATED ORAL at 16:08

## 2024-07-07 RX ADMIN — PRASUGREL 10 MG: 10 TABLET, FILM COATED ORAL at 15:33

## 2024-07-07 RX ADMIN — FAMOTIDINE 20 MG: 10 INJECTION, SOLUTION INTRAVENOUS at 09:43

## 2024-07-07 RX ADMIN — ISOSORBIDE MONONITRATE 60 MG: 30 TABLET, EXTENDED RELEASE ORAL at 09:43

## 2024-07-07 RX ADMIN — FENTANYL CITRATE 25 MCG: 50 INJECTION, SOLUTION INTRAMUSCULAR; INTRAVENOUS at 11:43

## 2024-07-07 RX ADMIN — HYDROMORPHONE HYDROCHLORIDE 0.5 MG: 1 INJECTION, SOLUTION INTRAMUSCULAR; INTRAVENOUS; SUBCUTANEOUS at 15:06

## 2024-07-07 RX ADMIN — Medication 25 MG: at 12:19

## 2024-07-07 RX ADMIN — MIDAZOLAM HYDROCHLORIDE 1 MG: 1 INJECTION, SOLUTION INTRAMUSCULAR; INTRAVENOUS at 11:17

## 2024-07-07 RX ADMIN — CARVEDILOL 25 MG: 12.5 TABLET, FILM COATED ORAL at 09:43

## 2024-07-07 RX ADMIN — MIDAZOLAM HYDROCHLORIDE 1 MG: 1 INJECTION, SOLUTION INTRAMUSCULAR; INTRAVENOUS at 11:26

## 2024-07-07 RX ADMIN — ATORVASTATIN CALCIUM 40 MG: 20 TABLET, FILM COATED ORAL at 09:42

## 2024-07-07 RX ADMIN — PROPOFOL 50 MCG/KG/MIN: 10 INJECTION, EMULSION INTRAVENOUS at 11:27

## 2024-07-07 RX ADMIN — FENTANYL CITRATE 50 MCG: 50 INJECTION, SOLUTION INTRAMUSCULAR; INTRAVENOUS at 12:15

## 2024-07-07 ASSESSMENT — PAIN SCALES - GENERAL
PAINLEVEL_OUTOF10: 8
PAINLEVEL_OUTOF10: 8
PAINLEVEL_OUTOF10: 7
PAINLEVEL_OUTOF10: 8
PAINLEVEL_OUTOF10: 0
PAINLEVEL_OUTOF10: 8
PAINLEVEL_OUTOF10: 4
PAINLEVEL_OUTOF10: 3
PAINLEVEL_OUTOF10: 3
PAINLEVEL_OUTOF10: 0
PAINLEVEL_OUTOF10: 7
PAINLEVEL_OUTOF10: 5

## 2024-07-07 ASSESSMENT — PAIN DESCRIPTION - LOCATION
LOCATION: ABDOMEN
LOCATION: ABDOMEN
LOCATION: ABDOMEN;GROIN
LOCATION: ABDOMEN
LOCATION: ABDOMEN;GROIN

## 2024-07-07 ASSESSMENT — PAIN DESCRIPTION - DESCRIPTORS
DESCRIPTORS: ACHING
DESCRIPTORS: ACHING
DESCRIPTORS: SHARP
DESCRIPTORS: ACHING;SHARP
DESCRIPTORS: ACHING
DESCRIPTORS: ACHING;SHARP

## 2024-07-07 ASSESSMENT — PAIN - FUNCTIONAL ASSESSMENT: PAIN_FUNCTIONAL_ASSESSMENT: 0-10

## 2024-07-07 ASSESSMENT — PAIN DESCRIPTION - ONSET: ONSET: ON-GOING

## 2024-07-07 ASSESSMENT — PAIN DESCRIPTION - ORIENTATION
ORIENTATION: LOWER
ORIENTATION: LEFT
ORIENTATION: LOWER
ORIENTATION: LOWER
ORIENTATION: LEFT

## 2024-07-07 ASSESSMENT — PAIN DESCRIPTION - PAIN TYPE: TYPE: SURGICAL PAIN

## 2024-07-07 NOTE — PLAN OF CARE
Problem: Chronic Conditions and Co-morbidities  Goal: Patient's chronic conditions and co-morbidity symptoms are monitored and maintained or improved  7/6/2024 2034 by Cedric Colon RN  Outcome: Progressing  7/6/2024 1201 by Zaira Hill RN  Outcome: Progressing     Problem: Discharge Planning  Goal: Discharge to home or other facility with appropriate resources  7/6/2024 2034 by Cedric Colon RN  Outcome: Progressing  7/6/2024 1201 by Zaira Hill RN  Outcome: Progressing     Problem: Safety - Adult  Goal: Free from fall injury  7/6/2024 2034 by Cedric Colon RN  Outcome: Progressing  7/6/2024 1201 by Zaira Hill RN  Outcome: Progressing

## 2024-07-07 NOTE — BRIEF OP NOTE
Brief Postoperative Note      Patient: Miguel Marc  YOB: 1970  MRN: [id number]    Date of Procedure: 7/7/2024    Pre-Op Diagnosis Codes:     * Bilateral inguinal hernia without obstruction or gangrene, recurrence not specified [K40.20]    Post-Op Diagnosis: Same       Procedure(s):  OPEN BILATERAL INGUINAL HERNIA REPAIR WITH MESH, reduction incarcerated sigmoid colon    Surgeon(s):  Santino Dougherty MD    Assistant:  Surgical Assistant: Russ Li    Anesthesia: MAC    Estimated Blood Loss (mL): Minimal    Complications: None    Specimens:   ID Type Source Tests Collected by Time Destination   1 : left hernia sac Tissue Hernia Sac SURGICAL PATHOLOGY Santino Dougherty MD 7/7/2024 1401    2 : LEFT SPERMATIC CORD LIPOMA Tissue Spermatic Cord SURGICAL PATHOLOGY Santino Dougherty MD 7/7/2024 1240    3 : right spermatic cord lipoma Tissue Spermatic Cord SURGICAL PATHOLOGY Santino Dougherty MD 7/7/2024 1403        Implants:  Implant Name Type Inv. Item Serial No.  Lot No. LRB No. Used Action   MESH SALMA [id number] INGUINAL POLYPR SQ L PORE MFIL SFT KNIT - SNA  MESH SALMA [id number] INGUINAL POLYPR SQ L PORE MFIL SFT KNIT NA BARD DAVOL-WD [id number] Right 1 Implanted   MESH SALMA [id number] INGUINAL POLYPR SQ L PORE MFIL SFT KNIT - SNA  MESH SALMA [id number] INGUINAL POLYPR SQ L PORE MFIL SFT KNIT NA BARD DAVOL-WD [id number] Left 1 Implanted         Drains:   Closed/Suction Drain Left;Anterior Abdomen Bulb (Active)   Site Description Clean, dry & intact 07/07/24 1423   Dressing Status Clean, dry & intact 07/07/24 1423   Drainage Appearance Serosanguinous 07/07/24 1423   Drain Status Compressed;To bulb suction 07/07/24 1423       Urinary Catheter 07/05/24 Cuevas (Active)   $ Urethral catheter insertion $ Not inserted for procedure 07/07/24 0600   Catheter Indications Urinary retention (acute or chronic), continuous bladder irrigation or bladder outlet obstruction;Perioperative use for selected surgical procedures 
to thigh;Tamper seal intact;Bag below bladder;Bag not on floor;Lack of dependent loop in tubing;Drainage bag less than half full 07/07/24 0600   Status Draining;Patent 07/07/24 0600   Output (mL) 75 mL 07/07/24 1001       Findings:  Infection Present At Time Of Surgery (PATOS) (choose all levels that have infection present):  No infection present  Other Findings: Left inguinoscrotal hernia with incarcerated sliding sigmoid colon.  Right indirect inguinal hernia with moderate spermatic cord lipoma.     Electronically signed by Santino Dougherty MD on 7/7/2024 at 2:34 PM

## 2024-07-08 ENCOUNTER — APPOINTMENT (OUTPATIENT)
Facility: HOSPITAL | Age: 54
DRG: 344 | End: 2024-07-08
Attending: STUDENT IN AN ORGANIZED HEALTH CARE EDUCATION/TRAINING PROGRAM
Payer: COMMERCIAL

## 2024-07-08 LAB
ANION GAP SERPL CALC-SCNC: 18 MMOL/L (ref 5–15)
BACTERIA SPEC CULT: ABNORMAL
BASOPHILS # BLD: 0 K/UL (ref 0–0.1)
BASOPHILS NFR BLD: 0 % (ref 0–1)
BUN SERPL-MCNC: 98 MG/DL (ref 6–20)
BUN/CREAT SERPL: 10 (ref 12–20)
CALCIUM SERPL-MCNC: 8.9 MG/DL (ref 8.5–10.1)
CHLORIDE SERPL-SCNC: 94 MMOL/L (ref 97–108)
CO2 SERPL-SCNC: 14 MMOL/L (ref 21–32)
COLONY COUNT, CNT: ABNORMAL
CREAT SERPL-MCNC: 9.63 MG/DL (ref 0.7–1.3)
DIFFERENTIAL METHOD BLD: ABNORMAL
EOSINOPHIL # BLD: 0 K/UL (ref 0–0.4)
EOSINOPHIL NFR BLD: 0 % (ref 0–7)
ERYTHROCYTE [DISTWIDTH] IN BLOOD BY AUTOMATED COUNT: 14.9 % (ref 11.5–14.5)
GLUCOSE BLD STRIP.AUTO-MCNC: 234 MG/DL (ref 65–117)
GLUCOSE BLD STRIP.AUTO-MCNC: 256 MG/DL (ref 65–117)
GLUCOSE BLD STRIP.AUTO-MCNC: 311 MG/DL (ref 65–117)
GLUCOSE BLD STRIP.AUTO-MCNC: 382 MG/DL (ref 65–117)
GLUCOSE SERPL-MCNC: 319 MG/DL (ref 65–100)
HBV SURFACE AB SER QL: NONREACTIVE
HBV SURFACE AB SER-ACNC: <3.1 MIU/ML
HBV SURFACE AG SER QL: <0.1 INDEX
HBV SURFACE AG SER QL: NEGATIVE
HCT VFR BLD AUTO: 34.8 % (ref 36.6–50.3)
HGB BLD-MCNC: 11.3 G/DL (ref 12.1–17)
IMM GRANULOCYTES # BLD AUTO: 0.1 K/UL (ref 0–0.04)
IMM GRANULOCYTES NFR BLD AUTO: 0 % (ref 0–0.5)
LYMPHOCYTES # BLD: 0.3 K/UL (ref 0.8–3.5)
LYMPHOCYTES NFR BLD: 2 % (ref 12–49)
Lab: ABNORMAL
MAGNESIUM SERPL-MCNC: 2.4 MG/DL (ref 1.6–2.4)
MCH RBC QN AUTO: 26 PG (ref 26–34)
MCHC RBC AUTO-ENTMCNC: 32.5 G/DL (ref 30–36.5)
MCV RBC AUTO: 80.2 FL (ref 80–99)
MONOCYTES # BLD: 1.8 K/UL (ref 0–1)
MONOCYTES NFR BLD: 9 % (ref 5–13)
NEUTS SEG # BLD: 17.7 K/UL (ref 1.8–8)
NEUTS SEG NFR BLD: 89 % (ref 32–75)
NRBC # BLD: 0 K/UL (ref 0–0.01)
NRBC BLD-RTO: 0 PER 100 WBC
PHOSPHATE SERPL-MCNC: 9.7 MG/DL (ref 2.6–4.7)
PLATELET # BLD AUTO: 159 K/UL (ref 150–400)
PMV BLD AUTO: 9.8 FL (ref 8.9–12.9)
POTASSIUM SERPL-SCNC: 6.1 MMOL/L (ref 3.5–5.1)
RBC # BLD AUTO: 4.34 M/UL (ref 4.1–5.7)
SERVICE CMNT-IMP: ABNORMAL
SODIUM SERPL-SCNC: 126 MMOL/L (ref 136–145)
WBC # BLD AUTO: 19.9 K/UL (ref 4.1–11.1)

## 2024-07-08 PROCEDURE — 80048 BASIC METABOLIC PNL TOTAL CA: CPT

## 2024-07-08 PROCEDURE — 2580000003 HC RX 258: Performed by: NURSE PRACTITIONER

## 2024-07-08 PROCEDURE — 90935 HEMODIALYSIS ONE EVALUATION: CPT

## 2024-07-08 PROCEDURE — 6370000000 HC RX 637 (ALT 250 FOR IP): Performed by: SURGERY

## 2024-07-08 PROCEDURE — 6370000000 HC RX 637 (ALT 250 FOR IP): Performed by: INTERNAL MEDICINE

## 2024-07-08 PROCEDURE — 82962 GLUCOSE BLOOD TEST: CPT

## 2024-07-08 PROCEDURE — 86706 HEP B SURFACE ANTIBODY: CPT

## 2024-07-08 PROCEDURE — 83735 ASSAY OF MAGNESIUM: CPT

## 2024-07-08 PROCEDURE — 87340 HEPATITIS B SURFACE AG IA: CPT

## 2024-07-08 PROCEDURE — 6360000002 HC RX W HCPCS: Performed by: SURGERY

## 2024-07-08 PROCEDURE — 36415 COLL VENOUS BLD VENIPUNCTURE: CPT

## 2024-07-08 PROCEDURE — 85025 COMPLETE CBC W/AUTO DIFF WBC: CPT

## 2024-07-08 PROCEDURE — 6370000000 HC RX 637 (ALT 250 FOR IP)

## 2024-07-08 PROCEDURE — 6360000002 HC RX W HCPCS: Performed by: INTERNAL MEDICINE

## 2024-07-08 PROCEDURE — 84100 ASSAY OF PHOSPHORUS: CPT

## 2024-07-08 PROCEDURE — 5A1D70Z PERFORMANCE OF URINARY FILTRATION, INTERMITTENT, LESS THAN 6 HOURS PER DAY: ICD-10-PCS | Performed by: STUDENT IN AN ORGANIZED HEALTH CARE EDUCATION/TRAINING PROGRAM

## 2024-07-08 PROCEDURE — 94761 N-INVAS EAR/PLS OXIMETRY MLT: CPT

## 2024-07-08 PROCEDURE — 1100000000 HC RM PRIVATE

## 2024-07-08 PROCEDURE — 51702 INSERT TEMP BLADDER CATH: CPT

## 2024-07-08 PROCEDURE — 2580000003 HC RX 258: Performed by: INTERNAL MEDICINE

## 2024-07-08 RX ORDER — SEVELAMER CARBONATE 800 MG/1
1600 TABLET, FILM COATED ORAL
Status: DISCONTINUED | OUTPATIENT
Start: 2024-07-08 | End: 2024-07-09 | Stop reason: HOSPADM

## 2024-07-08 RX ORDER — DOCUSATE SODIUM 100 MG/1
100 CAPSULE, LIQUID FILLED ORAL 2 TIMES DAILY
Status: DISCONTINUED | OUTPATIENT
Start: 2024-07-08 | End: 2024-07-09 | Stop reason: HOSPADM

## 2024-07-08 RX ORDER — HYDRALAZINE HYDROCHLORIDE 25 MG/1
25 TABLET, FILM COATED ORAL 3 TIMES DAILY
Status: DISCONTINUED | OUTPATIENT
Start: 2024-07-08 | End: 2024-07-09

## 2024-07-08 RX ORDER — MIDODRINE HYDROCHLORIDE 5 MG/1
10 TABLET ORAL
Status: DISCONTINUED | OUTPATIENT
Start: 2024-07-08 | End: 2024-07-08

## 2024-07-08 RX ORDER — 0.9 % SODIUM CHLORIDE 0.9 %
250 INTRAVENOUS SOLUTION INTRAVENOUS ONCE
Status: COMPLETED | OUTPATIENT
Start: 2024-07-08 | End: 2024-07-08

## 2024-07-08 RX ORDER — ALBUMIN (HUMAN) 12.5 G/50ML
25 SOLUTION INTRAVENOUS PRN
Status: DISCONTINUED | OUTPATIENT
Start: 2024-07-08 | End: 2024-07-09 | Stop reason: HOSPADM

## 2024-07-08 RX ORDER — INSULIN LISPRO 100 [IU]/ML
15 INJECTION, SOLUTION INTRAVENOUS; SUBCUTANEOUS ONCE
Status: DISCONTINUED | OUTPATIENT
Start: 2024-07-08 | End: 2024-07-08

## 2024-07-08 RX ADMIN — ASPIRIN 81 MG: 81 TABLET, COATED ORAL at 17:22

## 2024-07-08 RX ADMIN — SEVELAMER CARBONATE 1600 MG: 800 TABLET, FILM COATED ORAL at 17:24

## 2024-07-08 RX ADMIN — INSULIN LISPRO 2 UNITS: 100 INJECTION, SOLUTION INTRAVENOUS; SUBCUTANEOUS at 17:23

## 2024-07-08 RX ADMIN — EZETIMIBE 10 MG: 10 TABLET ORAL at 17:23

## 2024-07-08 RX ADMIN — HYDROMORPHONE HYDROCHLORIDE 0.5 MG: 1 INJECTION, SOLUTION INTRAMUSCULAR; INTRAVENOUS; SUBCUTANEOUS at 14:40

## 2024-07-08 RX ADMIN — OXYCODONE 5 MG: 5 TABLET ORAL at 21:28

## 2024-07-08 RX ADMIN — INSULIN LISPRO 6 UNITS: 100 INJECTION, SOLUTION INTRAVENOUS; SUBCUTANEOUS at 08:45

## 2024-07-08 RX ADMIN — OXYCODONE 10 MG: 5 TABLET ORAL at 08:45

## 2024-07-08 RX ADMIN — PIPERACILLIN AND TAZOBACTAM 3375 MG: 3; .375 INJECTION, POWDER, LYOPHILIZED, FOR SOLUTION INTRAVENOUS at 03:30

## 2024-07-08 RX ADMIN — ATORVASTATIN CALCIUM 40 MG: 20 TABLET, FILM COATED ORAL at 17:23

## 2024-07-08 RX ADMIN — PIPERACILLIN AND TAZOBACTAM 3375 MG: 3; .375 INJECTION, POWDER, LYOPHILIZED, FOR SOLUTION INTRAVENOUS at 17:31

## 2024-07-08 RX ADMIN — INSULIN LISPRO 8 UNITS: 100 INJECTION, SOLUTION INTRAVENOUS; SUBCUTANEOUS at 13:32

## 2024-07-08 RX ADMIN — INSULIN GLARGINE 15 UNITS: 100 INJECTION, SOLUTION SUBCUTANEOUS at 08:45

## 2024-07-08 RX ADMIN — TAMSULOSIN HYDROCHLORIDE 0.4 MG: 0.4 CAPSULE ORAL at 17:22

## 2024-07-08 RX ADMIN — PRASUGREL 10 MG: 10 TABLET, FILM COATED ORAL at 17:38

## 2024-07-08 RX ADMIN — SODIUM CHLORIDE 250 ML: 9 INJECTION, SOLUTION INTRAVENOUS at 05:20

## 2024-07-08 RX ADMIN — DOCUSATE SODIUM 100 MG: 100 CAPSULE, LIQUID FILLED ORAL at 21:28

## 2024-07-08 ASSESSMENT — PAIN SCALES - GENERAL
PAINLEVEL_OUTOF10: 10
PAINLEVEL_OUTOF10: 5
PAINLEVEL_OUTOF10: 7
PAINLEVEL_OUTOF10: 3
PAINLEVEL_OUTOF10: 6

## 2024-07-08 ASSESSMENT — PAIN DESCRIPTION - DESCRIPTORS: DESCRIPTORS: ACHING

## 2024-07-08 ASSESSMENT — PAIN DESCRIPTION - ORIENTATION: ORIENTATION: LOWER;RIGHT

## 2024-07-08 ASSESSMENT — PAIN DESCRIPTION - LOCATION
LOCATION: ABDOMEN
LOCATION: ABDOMEN

## 2024-07-08 NOTE — OP NOTE
[facility]          [address] [place]  [address]                            OPERATIVE REPORT      PATIENT NAME: JESUS YUAN                   : 1970  MED REC NO: [id number]                       ROOM: [id number]  ACCOUNT NO: [id number]                       ADMIT DATE: 2024  PROVIDER: Santino Dougherty MD    DATE OF SERVICE:  2024    PREOPERATIVE DIAGNOSES:  Incarcerated left inguinal hernia, symptomatic.    POSTOPERATIVE DIAGNOSES:  Incarcerated left inguinal hernia, symptomatic.    PROCEDURES PERFORMED:       1. Open bilateral inguinal hernia repair with mesh.     2. Reduction of incarcerated sigmoid colon.    SURGEON:  Santino Dougherty MD    ASSISTANT:  Russ Li    ANESTHESIA:  Monitored anesthesia care.    ESTIMATED BLOOD LOSS:  Minimal.    SPECIMENS REMOVED:  Left inguinal hernia sac, left spermatic cord lipoma, right spermatic cord lipoma.    INTRAOPERATIVE FINDINGS:  No infection at the time of surgery.  Left inguinal scrotal hernia with incarcerated sliding sigmoid colon with portion of the sigmoid colon being a part of the hernia sac.  Right indirect inguinal hernia with moderate spermatic cord lipoma.     COMPLICATIONS:  None.    IMPLANTS:  6 x 6 polypropylene mesh x 2.    INDICATIONS:  The patient is a 54-year-old male with multiple comorbidities, polycystic kidney disease, obesity, coronary artery disease with reduced ejection fraction, who presents with symptomatic left inguinal hernia.    DESCRIPTION OF PROCEDURE:  Consent was obtained.  The patient was taken to the operating room and placed in the supine position.  After the successful induction of monitored anesthesia care, the groins were prepped and draped in the usual fashion.  A time-out was performed per protocol.   Ten cm inguinal crease incision was made with a skin knife after local was administered not only in the skin, but in the ilioinguinal nerve space.  As the

## 2024-07-08 NOTE — PLAN OF CARE
Problem: Chronic Conditions and Co-morbidities  Goal: Patient's chronic conditions and co-morbidity symptoms are monitored and maintained or improved  Outcome: Progressing     Problem: Discharge Planning  Goal: Discharge to home or other facility with appropriate resources  Outcome: Progressing     Problem: Safety - Adult  Goal: Free from fall injury  Outcome: Progressing     Problem: Pain  Goal: Verbalizes/displays adequate comfort level or baseline comfort level  Outcome: Progressing  Flowsheets (Taken 7/7/2024 1420 by Siena Jung RN)  Verbalizes/displays adequate comfort level or baseline comfort level: Encourage patient to monitor pain and request assistance

## 2024-07-08 NOTE — FLOWSHEET NOTE
Primary RN SBAR: NOEL Pitt RN  Patient Education provided: procedural / infection control  Incapacitated Nurse [facility]. provided: Yes  Preferred Education method and Primary language: English / Verbal  Hospital associated wait time; reason: none  Hepatitis B Surface Ag   Date/Time Value Ref Range Status   07/08/2024 02:35 AM <0.10 Index Final     Hep B S Ag Interp   Date/Time Value Ref Range Status   07/08/2024 02:35 AM Negative NEG   Final     Hep B S Ab   Date/Time Value Ref Range Status   07/08/2024 02:35 AM <3.10 mIU/mL Final     Hep B S Ab Interp   Date/Time Value Ref Range Status   07/08/2024 02:35 AM NONREACTIVE NR   Final     Comment:     (NOTE)  The [employer] Anti-HBs2 assay is traceable to the World Health   Organization (WHO) Hepatitis B Immunoglobulin 1st International   Reference Preparation (1977). Samples with a calculated value of 10   mIU/mL or greater are considered reactive (protective) in accordance   with the CDC guidelines. The accepted criteria for immunity to HBV is   anti-HBs activity greater than or equal to 10 mIU/mL, as defined by   the WHO International Reference Preparation.  Assay performance has not been established in pregnant women,   patients who are immunosuppressed or immunocompromised, nor have   performance characteristics been established in conjunction with   other 's assays for specific HBV serologic markers. This   assay does not differentiate between vaccine induced immune response   and a response due to infection with HBV. Passively acquired anti-HBs   may be identified following patient transfusion, receipt of   immunoglobulin products, etc.            07/08/24 1130   Vital Signs   /76   Temp 98.4 °F (36.9 °C)   Pulse 94   Respirations 18   SpO2 96 %   Treatment   Time On 1145   Treatment Goal UF 2 kg as iris   Observations & Evaluations   Level of Consciousness 0   Oriented X 4   Respiratory Quality/Effort Unlabored   O2 Device None (Room air)

## 2024-07-09 ENCOUNTER — APPOINTMENT (OUTPATIENT)
Facility: HOSPITAL | Age: 54
DRG: 344 | End: 2024-07-09
Attending: STUDENT IN AN ORGANIZED HEALTH CARE EDUCATION/TRAINING PROGRAM
Payer: COMMERCIAL

## 2024-07-09 VITALS
BODY MASS INDEX: 31.66 KG/M2 | RESPIRATION RATE: 18 BRPM | HEART RATE: 90 BPM | OXYGEN SATURATION: 97 % | DIASTOLIC BLOOD PRESSURE: 79 MMHG | SYSTOLIC BLOOD PRESSURE: 112 MMHG | WEIGHT: 240 LBS | TEMPERATURE: 98.1 F

## 2024-07-09 LAB
ANION GAP SERPL CALC-SCNC: 13 MMOL/L (ref 5–15)
BASOPHILS # BLD: 0 K/UL (ref 0–0.1)
BASOPHILS NFR BLD: 0 % (ref 0–1)
BUN SERPL-MCNC: 72 MG/DL (ref 6–20)
BUN/CREAT SERPL: 9 (ref 12–20)
CALCIUM SERPL-MCNC: 8.8 MG/DL (ref 8.5–10.1)
CHLORIDE SERPL-SCNC: 94 MMOL/L (ref 97–108)
CO2 SERPL-SCNC: 19 MMOL/L (ref 21–32)
CREAT SERPL-MCNC: 7.98 MG/DL (ref 0.7–1.3)
DIFFERENTIAL METHOD BLD: ABNORMAL
EOSINOPHIL # BLD: 0 K/UL (ref 0–0.4)
EOSINOPHIL NFR BLD: 0 % (ref 0–7)
ERYTHROCYTE [DISTWIDTH] IN BLOOD BY AUTOMATED COUNT: 14.7 % (ref 11.5–14.5)
GLUCOSE BLD STRIP.AUTO-MCNC: 193 MG/DL (ref 65–117)
GLUCOSE BLD STRIP.AUTO-MCNC: 264 MG/DL (ref 65–117)
GLUCOSE SERPL-MCNC: 224 MG/DL (ref 65–100)
HCT VFR BLD AUTO: 32 % (ref 36.6–50.3)
HGB BLD-MCNC: 10.7 G/DL (ref 12.1–17)
IMM GRANULOCYTES # BLD AUTO: 0.2 K/UL (ref 0–0.04)
IMM GRANULOCYTES NFR BLD AUTO: 1 % (ref 0–0.5)
LYMPHOCYTES # BLD: 0.5 K/UL (ref 0.8–3.5)
LYMPHOCYTES NFR BLD: 3 % (ref 12–49)
MAGNESIUM SERPL-MCNC: 2.4 MG/DL (ref 1.6–2.4)
MCH RBC QN AUTO: 25.7 PG (ref 26–34)
MCHC RBC AUTO-ENTMCNC: 33.4 G/DL (ref 30–36.5)
MCV RBC AUTO: 76.9 FL (ref 80–99)
MONOCYTES # BLD: 2.6 K/UL (ref 0–1)
MONOCYTES NFR BLD: 16 % (ref 5–13)
NEUTS SEG # BLD: 13.2 K/UL (ref 1.8–8)
NEUTS SEG NFR BLD: 80 % (ref 32–75)
NRBC # BLD: 0 K/UL (ref 0–0.01)
NRBC BLD-RTO: 0 PER 100 WBC
PHOSPHATE SERPL-MCNC: 7.5 MG/DL (ref 2.6–4.7)
PLATELET # BLD AUTO: 146 K/UL (ref 150–400)
PMV BLD AUTO: 9.9 FL (ref 8.9–12.9)
POTASSIUM SERPL-SCNC: 4.2 MMOL/L (ref 3.5–5.1)
RBC # BLD AUTO: 4.16 M/UL (ref 4.1–5.7)
RBC MORPH BLD: ABNORMAL
SERVICE CMNT-IMP: ABNORMAL
SERVICE CMNT-IMP: ABNORMAL
SODIUM SERPL-SCNC: 126 MMOL/L (ref 136–145)
WBC # BLD AUTO: 16.5 K/UL (ref 4.1–11.1)

## 2024-07-09 PROCEDURE — 6360000002 HC RX W HCPCS: Performed by: INTERNAL MEDICINE

## 2024-07-09 PROCEDURE — 0J2SXYZ CHANGE OTHER DEVICE IN HEAD AND NECK SUBCUTANEOUS TISSUE AND FASCIA, EXTERNAL APPROACH: ICD-10-PCS | Performed by: STUDENT IN AN ORGANIZED HEALTH CARE EDUCATION/TRAINING PROGRAM

## 2024-07-09 PROCEDURE — 2709999900 HC NON-CHARGEABLE SUPPLY

## 2024-07-09 PROCEDURE — 80048 BASIC METABOLIC PNL TOTAL CA: CPT

## 2024-07-09 PROCEDURE — 2580000003 HC RX 258: Performed by: INTERNAL MEDICINE

## 2024-07-09 PROCEDURE — 6370000000 HC RX 637 (ALT 250 FOR IP): Performed by: INTERNAL MEDICINE

## 2024-07-09 PROCEDURE — 85025 COMPLETE CBC W/AUTO DIFF WBC: CPT

## 2024-07-09 PROCEDURE — C1769 GUIDE WIRE: HCPCS

## 2024-07-09 PROCEDURE — 83735 ASSAY OF MAGNESIUM: CPT

## 2024-07-09 PROCEDURE — 6370000000 HC RX 637 (ALT 250 FOR IP): Performed by: SURGERY

## 2024-07-09 PROCEDURE — 36415 COLL VENOUS BLD VENIPUNCTURE: CPT

## 2024-07-09 PROCEDURE — 82962 GLUCOSE BLOOD TEST: CPT

## 2024-07-09 PROCEDURE — C1750 CATH, HEMODIALYSIS,LONG-TERM: HCPCS

## 2024-07-09 PROCEDURE — 77001 FLUOROGUIDE FOR VEIN DEVICE: CPT

## 2024-07-09 PROCEDURE — 2500000003 HC RX 250 WO HCPCS: Performed by: INTERNAL MEDICINE

## 2024-07-09 PROCEDURE — 84100 ASSAY OF PHOSPHORUS: CPT

## 2024-07-09 PROCEDURE — 6360000002 HC RX W HCPCS

## 2024-07-09 PROCEDURE — 6370000000 HC RX 637 (ALT 250 FOR IP)

## 2024-07-09 RX ORDER — FENTANYL CITRATE 50 UG/ML
INJECTION, SOLUTION INTRAMUSCULAR; INTRAVENOUS PRN
Status: COMPLETED | OUTPATIENT
Start: 2024-07-09 | End: 2024-07-09

## 2024-07-09 RX ORDER — POLYETHYLENE GLYCOL 3350 17 G/17G
17 POWDER, FOR SOLUTION ORAL DAILY PRN
Qty: 30 PACKET | Refills: 0 | Status: SHIPPED | OUTPATIENT
Start: 2024-07-09 | End: 2024-08-08

## 2024-07-09 RX ORDER — DIPHENHYDRAMINE HYDROCHLORIDE 50 MG/ML
INJECTION INTRAMUSCULAR; INTRAVENOUS PRN
Status: COMPLETED | OUTPATIENT
Start: 2024-07-09 | End: 2024-07-09

## 2024-07-09 RX ORDER — MIDAZOLAM HYDROCHLORIDE 1 MG/ML
INJECTION INTRAMUSCULAR; INTRAVENOUS PRN
Status: COMPLETED | OUTPATIENT
Start: 2024-07-09 | End: 2024-07-09

## 2024-07-09 RX ORDER — FAMOTIDINE 20 MG/1
20 TABLET, FILM COATED ORAL DAILY
Status: DISCONTINUED | OUTPATIENT
Start: 2024-07-10 | End: 2024-07-09 | Stop reason: HOSPADM

## 2024-07-09 RX ORDER — SEVELAMER CARBONATE 800 MG/1
1600 TABLET, FILM COATED ORAL
Qty: 90 TABLET | Refills: 3 | Status: SHIPPED | OUTPATIENT
Start: 2024-07-09

## 2024-07-09 RX ORDER — CEFDINIR 300 MG/1
300 CAPSULE ORAL 2 TIMES DAILY
Qty: 14 CAPSULE | Refills: 0 | Status: SHIPPED | OUTPATIENT
Start: 2024-07-09 | End: 2024-07-09

## 2024-07-09 RX ORDER — CEFDINIR 300 MG/1
300 CAPSULE ORAL DAILY
Qty: 7 CAPSULE | Refills: 0 | Status: SHIPPED | OUTPATIENT
Start: 2024-07-09 | End: 2024-07-16

## 2024-07-09 RX ORDER — METRONIDAZOLE 500 MG/1
500 TABLET ORAL 2 TIMES DAILY
Qty: 14 TABLET | Refills: 0 | Status: SHIPPED | OUTPATIENT
Start: 2024-07-09 | End: 2024-07-16

## 2024-07-09 RX ORDER — CARVEDILOL 25 MG/1
25 TABLET ORAL 2 TIMES DAILY
Qty: 60 TABLET | Refills: 0 | Status: SHIPPED | OUTPATIENT
Start: 2024-07-09

## 2024-07-09 RX ORDER — PSEUDOEPHEDRINE HCL 30 MG
100 TABLET ORAL 2 TIMES DAILY
Qty: 60 CAPSULE | Refills: 0 | Status: SHIPPED | OUTPATIENT
Start: 2024-07-09

## 2024-07-09 RX ADMIN — OXYCODONE 10 MG: 5 TABLET ORAL at 12:48

## 2024-07-09 RX ADMIN — DIPHENHYDRAMINE HYDROCHLORIDE 50 MG: 50 INJECTION, SOLUTION INTRAMUSCULAR; INTRAVENOUS at 08:36

## 2024-07-09 RX ADMIN — CARVEDILOL 25 MG: 12.5 TABLET, FILM COATED ORAL at 09:54

## 2024-07-09 RX ADMIN — PIPERACILLIN AND TAZOBACTAM 3375 MG: 3; .375 INJECTION, POWDER, LYOPHILIZED, FOR SOLUTION INTRAVENOUS at 05:41

## 2024-07-09 RX ADMIN — DOCUSATE SODIUM 100 MG: 100 CAPSULE, LIQUID FILLED ORAL at 09:54

## 2024-07-09 RX ADMIN — FENTANYL CITRATE 25 MCG: 50 INJECTION, SOLUTION INTRAMUSCULAR; INTRAVENOUS at 08:39

## 2024-07-09 RX ADMIN — PRASUGREL 10 MG: 10 TABLET, FILM COATED ORAL at 12:49

## 2024-07-09 RX ADMIN — SODIUM CHLORIDE, PRESERVATIVE FREE 10 ML: 5 INJECTION INTRAVENOUS at 09:57

## 2024-07-09 RX ADMIN — MIDAZOLAM HYDROCHLORIDE 1 MG: 1 INJECTION, SOLUTION INTRAMUSCULAR; INTRAVENOUS at 08:34

## 2024-07-09 RX ADMIN — INSULIN LISPRO 4 UNITS: 100 INJECTION, SOLUTION INTRAVENOUS; SUBCUTANEOUS at 12:32

## 2024-07-09 RX ADMIN — EZETIMIBE 10 MG: 10 TABLET ORAL at 09:54

## 2024-07-09 RX ADMIN — TAMSULOSIN HYDROCHLORIDE 0.4 MG: 0.4 CAPSULE ORAL at 09:54

## 2024-07-09 RX ADMIN — INSULIN GLARGINE 15 UNITS: 100 INJECTION, SOLUTION SUBCUTANEOUS at 09:55

## 2024-07-09 RX ADMIN — ATORVASTATIN CALCIUM 40 MG: 20 TABLET, FILM COATED ORAL at 09:54

## 2024-07-09 RX ADMIN — SEVELAMER CARBONATE 1600 MG: 800 TABLET, FILM COATED ORAL at 09:54

## 2024-07-09 RX ADMIN — ASPIRIN 81 MG: 81 TABLET, COATED ORAL at 09:55

## 2024-07-09 RX ADMIN — MIDAZOLAM HYDROCHLORIDE 1 MG: 1 INJECTION, SOLUTION INTRAMUSCULAR; INTRAVENOUS at 08:39

## 2024-07-09 RX ADMIN — ISOSORBIDE MONONITRATE 60 MG: 30 TABLET, EXTENDED RELEASE ORAL at 09:54

## 2024-07-09 RX ADMIN — FENTANYL CITRATE 50 MCG: 50 INJECTION, SOLUTION INTRAMUSCULAR; INTRAVENOUS at 08:32

## 2024-07-09 RX ADMIN — FAMOTIDINE 20 MG: 10 INJECTION, SOLUTION INTRAVENOUS at 09:55

## 2024-07-09 RX ADMIN — SEVELAMER CARBONATE 1600 MG: 800 TABLET, FILM COATED ORAL at 12:32

## 2024-07-09 ASSESSMENT — PAIN DESCRIPTION - DESCRIPTORS: DESCRIPTORS: SHARP;SORE

## 2024-07-09 ASSESSMENT — PAIN DESCRIPTION - LOCATION
LOCATION: ABDOMEN
LOCATION: ABDOMEN

## 2024-07-09 ASSESSMENT — PAIN SCALES - GENERAL
PAINLEVEL_OUTOF10: 6
PAINLEVEL_OUTOF10: 8

## 2024-07-09 ASSESSMENT — PAIN DESCRIPTION - ORIENTATION: ORIENTATION: LOWER

## 2024-07-09 NOTE — PROGRESS NOTES
NEPHROLOGY CONSULT NOTE     Patient: Miguel Marc MRN: [id number]  PCP: Ileana Anne APRN - NP   :     1970  Age:   54 y.o.  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    2 PTH  AOCD  -HB 13.0  HTN  Hyperkalemia   -6.1  Acidosis   -14  Hyponatremia     Plan:  HD today for 3.15 hr with 2k/138na and 35 bicarb bath  HD MWF schedule  HB stable   PO4 high  Start Renvela 2 tabs TID  HD cath in satisfactory position from chest xray  D/w patient and davita team             Subjective:   HPI: s/p hernia repair  K high 6.1  Due for HD  Patient reported catheter comes out from the place > chest x ray show catheter in satisfactory position    Past Medical Hx:   Past Medical History:   Diagnosis Date    Abdominal hernia 2020    Benign prostatic hyperplasia 3/16/2021    CAD (coronary artery disease)     Multiple PCIs:   RPDA CROW, 2021 tent thrombosis in LAD, lesion to the RPLB and  to the PDA,;  s/p CROW to mid/distal LCx    CKD (chronic kidney disease) stage 4, GFR 15-29 ml/min ([facility])     Congenital polycystic kidney 3/16/2021    Coronary arteriosclerosis 3/25/2020    Flu 2024    Hemodialysis patient ([facility])     [facility]    HFrEF (heart failure with reduced ejection fraction) ([facility])     <40%    History of COVID-19 3/28/2021    History of prostatitis     Hyperlipidemia     Hypertension     ONIEL (iron deficiency anemia)     Inguinal hernia     Ischemic cardiomyopathy 2023    Dilated, hypertrophied left ventricle with severely reduced systolic function. LV ejection fraction = 20-25%    Morbid obesity ([facility])     NSTEMI (non-ST elevated myocardial infarction) ([facility])     2020, 3/2020    Polycystic kidney disease     Spinal stenosis     T2DM (type 2 diabetes mellitus) ([facility])     insulin dependent, poorly controlled        Past Surgical 
      [facility]                                                                               Hospitalist Progress Note  KLARISSA VELAZQUEZ MD          Date of Service:  2024  NAME:  Miguel Marc  :  1970  MRN:  [id number]    Please note that this dictation was completed with [employer], the computer voice recognition software.  Quite often unanticipated grammatical, syntax, homophones, and other interpretive errors are inadvertently transcribed by the computer software.  Please disregard these errors.  Please excuse any errors that have escaped final proofreading.    Admission Summary:   53 yo hx of HTN, DM, CAD, sCHF EF 20-25%, ESRD on HD, presented w/ LLQ pain, large L inguinal hernia, UTI. The patient c/o severe LLQ pain this AM. Pain was described as sharp, radiating to groin. Denied chest pain, SOB, fevers, chills, nausea, vomiting, diarrhea. In the ED, WBC was 17.2. U/A significant for a UTI. Abd CT showed a large inguinal hernia containing sigmoid colon.              Interval history / Subjective:     Has not passed flatus since surgery yesterday. No abdominal pain. Tolerated full liquid and asking for 'real food'.     Assessment & Plan:           Anticipated discharge date :   Anticipated disposition : Home   Barriers to discharge : Permacath exchange       ESRD:   MWF at home via right permacath  Nephrology following  - CXR  confirmed that Permacath is in the right position and ok to use for HD today. However it was pulled out by the patient and needs to be replaced by IR.   - IR consulted. Permacath exchange tomorrow         Acute LLQ pain  Due to Incarcerated left inguinal hernia   S/p  reduction of incarcerated bowel and hernia mesh repair on   - CLD   - Zosyn for now     UTI:   monitor urine Cx, prelim Lactose fermenting GNR > 100,000  - Cont IV abx above     CAD/sCHF:   EF 20-25%. Compensated.   -continue coreg, imdur, ASA, statin.    - HD for 
    [facility].       7/9/2024      RE: Miguel Marc      To Whom it May Concern:      This is to certify that Miguel Marc was admitted to hospital on 7/6/2024   to 7/9/2024.  He may return to work on 7/15/2024 for half days (reduced hours) for 2 weeks. He can resume regular work hours on 7/29/2024.      Thank you for your assistance in this matter.    Sincerely,      KLARISSA VELAZQUEZ MD    
9:24 AM  TRANSFER - OUT REPORT:    Verbal report given to Transport on Miguel Marc  being transferred to [facility] for routine progression of patient care       Report consisted of patient's Situation, Background, Assessment and   Recommendations(SBAR).     Information from the following report(s) Nurse Handoff Report was reviewed with the receiving nurse.           Lines:   Peripheral IV 07/05/24 Left Antecubital (Active)   Site Assessment Clean, dry & intact 07/08/24 1930   Line Status Flushed;Infusing 07/08/24 1930   Line Care Connections checked and tightened 07/08/24 1930   Phlebitis Assessment No symptoms 07/08/24 1930   Infiltration Assessment 0 07/08/24 1930   Alcohol Cap Used Yes 07/08/24 1930   Dressing Status Clean, dry & intact 07/08/24 1930   Dressing Type Transparent 07/08/24 1930   Dressing Intervention New 07/06/24 0030       Tunneled Hemodialysis Catheter Right Subclavian (Active)   Access Status  Not Accessed 07/09/24 0901   Site Assessment Clean, dry & intact 07/09/24 0901   Dressing Type Sterile dressing, transparent 07/09/24 0901   Date of Last Dressing Change 07/09/24 07/09/24 0901   Dressing Status Clean, dry & intact 07/09/24 0901        Opportunity for questions and clarification was provided.      Patient transported with:  Transport         
Chart reviewed in preparation for physical therapy evaluation, initiated PT this morning at 1059. Patient up in bedside chair with family member present. Reporting no PT or OT needs at this time and has been ambulatory repeatedly today and last night. Patient noting abdominal soreness, educated on log roll for bed mobility to address.  Will complete PT orders.     Thank you,  Conor Foster, PT, DPT    
IR RN to patient bedside to assess Perm Cath.  Cuff noted to be out and visible.  Cath redressed per protocal IR Provider notified.    
Occupational Therapy Note  7/9/2024    OT eval order received and acknowledged. Screen completed as pt is currently at baseline functional status for self care and functional transfers/mobility (pt stating he has been getting up with spouse). Patient reporting no OT needs at this time (pt educated on LB Dressing techniques post surgery with pt and spouse verbalizing understanding). Pt with no acute OT needs at this time thus will D/C from skilled OT services after screen. Recommend discharge to home with family when medically appropriate Please re-consult if pt status changes.     Thank you,  eMena Norris OTR/L    
Occupational Therapy Note:  Orders received and appreciated.  Chart reviewed.  Attempted OT eval and pt just starting HD and will be unavailable for the next 4 hours.  Will continue to follow for OT eval.  Thank you for this consult.  Josefina Saenz, OTR/L, CBIS  
Pharmacy Dosing Services: 7/9/24    The pharmacist has determined that this patient meets P & T approved criteria for conversion from IV to oral therapy for the following medication: famotidine      The pharmacist has written the following order for the patient: 20mg po daily  The pharmacist will continue to monitor the patient's status and advise the physician if conversion back to IV therapy is recommended.    Signed Ni Norris RPH Contact information: [phone]    
Physical Therapy    Consult received, chart reviewed.  Patient currently just starting HD, will be unavailable for next 4 hours.  Will follow tomorrow for PT evaluation as appropriate.    Ileana Avalos MS, PT       
Report received from [place] [facility]. Pt has hemodialysis catheter in place, RN stated that wife changed the dressing during night shift and later informed the nursing staff that the catheter was not in the correct place. Per RN, internal medicine doctor was notified and told RN to notify nephrologist which she reported was done without a callback received. I notified Dr. Dougherty of the concerns that the hemodialysis catheter might not be in the correct placement so that it was not used for any reason.  
TRANSFER - OUT REPORT:    Verbal report given to [place] [facility] on Miguel Marc  being transferred to [facility] for routine post-op       Report consisted of patient's Situation, Background, Assessment and   Recommendations(SBAR).     Information from the following report(s) Nurse Handoff Report was reviewed with the receiving nurse.           Lines:   Peripheral IV 07/05/24 Left Antecubital (Active)   Site Assessment Clean, dry & intact 07/07/24 1450   Line Status Infusing;Flushed 07/07/24 1450   Line Care Connections checked and tightened 07/07/24 1450   Phlebitis Assessment No symptoms 07/07/24 1450   Infiltration Assessment 0 07/07/24 1450   Alcohol Cap Used Yes 07/07/24 1450   Dressing Status Dry;Intact 07/07/24 1450   Dressing Type Other (Comment) 07/07/24 1450   Dressing Intervention New 07/06/24 0030       Hemodialysis Central Access Right Subclavian (Active)   Site Assessment Clean, dry & intact 07/07/24 0800   Alcohol Cap Used Yes 07/07/24 0800   Dressing Type Sterile dressing, gauze 07/07/24 0800   Dressing Status Clean;Dry;Intact 07/07/24 0800        Opportunity for questions and clarification was provided.      Patient transported with:  Registered Nurse       
TRANSFER - OUT REPORT:    Verbal report given to LIZZY(name) [facility]       Report consisted of patient's Situation, Background, Assessment and   Recommendations(SBAR).     Information from the following report(s) Nurse Handoff Report was reviewed with the receiving nurse.    Opportunity for questions and clarification was provided.      Patient transported with:   Registered Nurse    
1230 91/66 -- -- 90 -- --   07/08/24 1220 94/64 -- -- 90 -- --   07/08/24 1215 (!) 86/66 -- -- 93 -- --   07/08/24 1205 98/69 -- -- 92 -- --   07/08/24 1200 99/73 -- -- 90 -- --   07/08/24 1145 113/74 -- -- 88 -- --   07/08/24 1130 106/76 98.4 °F (36.9 °C) -- 94 18 96 %       Physical Exam:  General: Alert, cooperative, no distress, appears stated age.  Neck:  Supple, symmetrical, trachea midline, no adenopathy, thyroid: no enlargement/tenderness/nodules, no carotid bruit and no JVD.  Lungs: Clear to auscultation bilaterally.  Heart:  Regular rate and rhythm, S1, S2 normal, no murmur, click, rub or gallop.  Abdomen: Soft, appropriately tender. Bowel sounds normal. No masses,  No organomegaly. NILO drain serosanguinous.  Extremities: Extremities normal, atraumatic, no cyanosis or edema.  Skin:  Skin color, texture, turgor normal. No rashes or lesions    Labs:   Recent Labs     07/08/24  0235   WBC 19.9*   HGB 11.3*   HCT 34.8*        Recent Labs     07/06/24  0125 07/06/24  1501 07/08/24  0235   *   < > 126*   K 4.5   < > 6.1*   CL 99   < > 94*   CO2 18*   < > 14*   BUN 63*   < > 98*   MG 1.8   < > 2.4   PHOS 5.6*   < > 9.7*   ALT 7*  --   --     < > = values in this interval not displayed.         Principal Problem:    Left inguinal hernia  Active Problems:    Type 2 diabetes mellitus with hyperglycemia, without long-term current use of insulin (HCC)    UTI (urinary tract infection)  Resolved Problems:    * No resolved hospital problems. *      Problem List Items Addressed This Visit    None    
    ______________________________________________________________________  EXPECTED LENGTH OF STAY: 3  ACTUAL LENGTH OF STAY:          0                 KLARISSA VELAZQUEZ MD   
vial 15 Units  15 Units SubCUTAneous Daily     Facility-Administered Medications Ordered in Other Encounters   Medication Dose Route Frequency    propofol infusion   IntraVENous PRN    ketamine (KETALAR) injection   IntraVENous PRN    midazolam (VERSED) injection   IntraVENous PRN    fentaNYL (SUBLIMAZE) injection   IntraVENous PRN    ePHEDrine injection   IntraVENous PRN     ______________________________________________________________________  EXPECTED LENGTH OF STAY: 3  ACTUAL LENGTH OF STAY:          1                 KLARISSA VELAZQUEZ MD

## 2024-07-09 NOTE — DISCHARGE SUMMARY
PHYSICAL EXAMINATION AT DISCHARGE:    General : alert x 3, awake, no acute distress,   HEENT: PEERL, EOMI, moist mucus membrane  Neck: supple, no JVD, no meningeal signs  Chest: Clear to auscultation bilaterally   CVS: S1 S2 heard, Capillary refill less than 2 seconds  Abd: soft/ Non tender, non distended, BS physiological,   Ext: no clubbing, no cyanosis, no edema, brisk 2+ DP pulses  Neuro/Psych: pleasant mood and affect, no focal neurologic deficit  Skin: warm     CHRONIC MEDICAL DIAGNOSES:      Greater than 31 minutes were spent with the patient on counseling and coordination of care    Signed:   KLARISSA VELAZQUEZ MD  7/9/2024  6:19 PM

## 2024-07-09 NOTE — PLAN OF CARE
Problem: Chronic Conditions and Co-morbidities  Goal: Patient's chronic conditions and co-morbidity symptoms are monitored and maintained or improved  7/9/2024 1412 by Macie Mon RN  Outcome: Adequate for Discharge  7/9/2024 0257 by Eula Herrera RN  Outcome: Progressing     Problem: Discharge Planning  Goal: Discharge to home or other facility with appropriate resources  7/9/2024 1412 by Macie Mon RN  Outcome: Adequate for Discharge  7/9/2024 0257 by Eula Herrera RN  Outcome: Progressing     Problem: Safety - Adult  Goal: Free from fall injury  7/9/2024 1412 by Macie Mon RN  Outcome: Adequate for Discharge  7/9/2024 0257 by Eula Herrera RN  Outcome: Progressing  Flowsheets (Taken 7/8/2024 1935)  Free From Fall Injury: Instruct family/caregiver on patient safety     Problem: Pain  Goal: Verbalizes/displays adequate comfort level or baseline comfort level  7/9/2024 1412 by Macie Mon RN  Outcome: Adequate for Discharge  7/9/2024 0257 by Eula Herrera RN  Outcome: Progressing  Flowsheets (Taken 7/8/2024 2128)  Verbalizes/displays adequate comfort level or baseline comfort level:   Encourage patient to monitor pain and request assistance   Assess pain using appropriate pain scale   Administer analgesics based on type and severity of pain and evaluate response   Implement non-pharmacological measures as appropriate and evaluate response   Consider cultural and social influences on pain and pain management   Notify Licensed Independent Practitioner if interventions unsuccessful or patient reports new pain

## 2024-07-09 NOTE — DISCHARGE INSTRUCTIONS
is not advised.  You may ride as a passenger anytime. Otherwise, wait until the follow up visit.    6. PAIN: I try to make the post-operative course tolerable, but you will-and should-have some discomfort for a few days (even with pain medication.)      a)  Walking is fine, but too much activity after surgery can aggravate pain.  On the other hand, you don't need to be in bed all day either.  You should ambulate every few hours while awake.  Focus on basic activities like ambulating to bathroom, to meals, and very short trips outside (even to mailbox may be excessive) and go back to comfortable resting position with ice packs.  b)  VERY IMPORTANT:  Use ice packs as often as possible (fifteen minutes on, fifteen minutes off and exchange as needed). It will help with pain.   c) If pain is not managed with the above measures, a narcotic will be provided for break-through pain which should be used very carefully.      In some cases, narcotic medication may cause constipation - Colace, Senna(Senokot) or Milk of Magnesia may be used as needed.  You should not go more than 24 hours outside of your usual time of passing stool.     7. WOUND: If you notice any increased redness, swelling, pain or fever, please call the office. If this is noticed at a time after normal office hours, please call our answering service at [phone]. The  will then contact the surgeon “on call”.    8. URINATION: If unable to void (unable to pass your water) for more than 10 hours after your surgery please return to our hospital emergency department.    9. APPOINTMENT: I would like to see you in the office in in 14-21 days.  A Telehealth appointment is fine for follow up, as long as there are no issues that you feel warrant an office visit.  If this appointment has not been made for you prior to your departure from Outpatient Surgery, please call the office to schedule your appointment.    10. DISCOLORATION: Do not be alarmed by black

## 2024-07-10 ENCOUNTER — TELEPHONE (OUTPATIENT)
Facility: CLINIC | Age: 54
End: 2024-07-10

## 2024-07-10 LAB
GLUCOSE BLD STRIP.AUTO-MCNC: 242 MG/DL (ref 65–117)
SERVICE CMNT-IMP: ABNORMAL

## 2024-07-10 NOTE — TELEPHONE ENCOUNTER
Care Transitions Initial Follow Up Call    Outreach made within 2 business days of discharge: Yes    Patient: Miguel Marc Patient : 1970   MRN: [id number]  Reason for Admission: There are no discharge diagnoses documented for the most recent discharge.  Discharge Date: 24       Spoke with: pt wife     Discharge department/facility: [facility] Interactive Patient Contact:  Was patient able to fill all prescriptions: yes  Was patient instructed to bring all medications to the follow-up visit: Yes  Is patient taking all medications as directed in the discharge summary? Yes  Does patient understand their discharge instructions: Yes  Does patient have questions or concerns that need addressed prior to 7-14 day follow up office visit: no    Scheduled appointment with PCP within 7-14 days    Follow Up  Future Appointments   Date Time Provider Department Center   7/15/2024 11:00 AM Soo Mckinley APRN - CNP [facility] Donovan LPN

## 2024-07-11 LAB
BACTERIA SPEC CULT: NORMAL
BACTERIA SPEC CULT: NORMAL
Lab: NORMAL
Lab: NORMAL

## 2024-07-15 ENCOUNTER — OFFICE VISIT (OUTPATIENT)
Facility: CLINIC | Age: 54
End: 2024-07-15

## 2024-07-15 VITALS
SYSTOLIC BLOOD PRESSURE: 100 MMHG | HEIGHT: 73 IN | BODY MASS INDEX: 31.81 KG/M2 | TEMPERATURE: 97.3 F | WEIGHT: 240 LBS | DIASTOLIC BLOOD PRESSURE: 66 MMHG | OXYGEN SATURATION: 98 % | HEART RATE: 109 BPM | RESPIRATION RATE: 18 BRPM

## 2024-07-15 DIAGNOSIS — E11.65 UNCONTROLLED TYPE 2 DIABETES MELLITUS WITH HYPERGLYCEMIA (HCC): ICD-10-CM

## 2024-07-15 DIAGNOSIS — N30.00 ACUTE CYSTITIS WITHOUT HEMATURIA: ICD-10-CM

## 2024-07-15 DIAGNOSIS — K40.00 NON-RECURRENT BILATERAL INGUINAL HERNIA WITH OBSTRUCTION WITHOUT GANGRENE: ICD-10-CM

## 2024-07-15 DIAGNOSIS — Z09 HOSPITAL DISCHARGE FOLLOW-UP: Primary | ICD-10-CM

## 2024-07-15 RX ORDER — FUROSEMIDE 80 MG
80 TABLET ORAL 3 TIMES DAILY
COMMUNITY

## 2024-07-15 SDOH — ECONOMIC STABILITY: FOOD INSECURITY: WITHIN THE PAST 12 MONTHS, THE FOOD YOU BOUGHT JUST DIDN'T LAST AND YOU DIDN'T HAVE MONEY TO GET MORE.: PATIENT DECLINED

## 2024-07-15 SDOH — ECONOMIC STABILITY: INCOME INSECURITY: HOW HARD IS IT FOR YOU TO PAY FOR THE VERY BASICS LIKE FOOD, HOUSING, MEDICAL CARE, AND HEATING?: PATIENT DECLINED

## 2024-07-15 SDOH — ECONOMIC STABILITY: HOUSING INSECURITY
IN THE LAST 12 MONTHS, WAS THERE A TIME WHEN YOU DID NOT HAVE A STEADY PLACE TO SLEEP OR SLEPT IN A SHELTER (INCLUDING NOW)?: PATIENT DECLINED

## 2024-07-15 SDOH — ECONOMIC STABILITY: FOOD INSECURITY: WITHIN THE PAST 12 MONTHS, YOU WORRIED THAT YOUR FOOD WOULD RUN OUT BEFORE YOU GOT MONEY TO BUY MORE.: PATIENT DECLINED

## 2024-07-15 ASSESSMENT — PATIENT HEALTH QUESTIONNAIRE - PHQ9
SUM OF ALL RESPONSES TO PHQ QUESTIONS 1-9: 0
2. FEELING DOWN, DEPRESSED OR HOPELESS: NOT AT ALL
SUM OF ALL RESPONSES TO PHQ QUESTIONS 1-9: 0
SUM OF ALL RESPONSES TO PHQ QUESTIONS 1-9: 0
1. LITTLE INTEREST OR PLEASURE IN DOING THINGS: NOT AT ALL
SUM OF ALL RESPONSES TO PHQ9 QUESTIONS 1 & 2: 0
SUM OF ALL RESPONSES TO PHQ QUESTIONS 1-9: 0

## 2024-07-15 ASSESSMENT — ENCOUNTER SYMPTOMS
RESPIRATORY NEGATIVE: 1
ABDOMINAL PAIN: 1

## 2024-07-15 NOTE — PROGRESS NOTES
Post-Discharge Transitional Care Follow Up      Miguel Marc   YOB: 1970    Date of Office Visit:  7/15/2024  Date of Hospital Admission: 7/6/24  Date of Hospital Discharge: 7/9/24  Readmission Risk Score (high >=14%. Medium >=10%):Readmission Risk Score: 17.3      Care management risk score Rising risk (score 2-5) and Complex Care (Scores >=6): No Risk Score On File     Non face to face  following discharge, date last encounter closed (first attempt may have been earlier): 07/10/2024     Call initiated 2 business days of discharge: Yes     Hospital discharge follow-up  -     SD DISCHARGE MEDS RECONCILED W/ CURRENT OUTPATIENT MED LIST  Hospital admission notes and imaging labs and medication changes reviewed.  Continue Flagyl and cefdinir as directed.  Keep appointment with surgeon on 7/24/2024.    Uncontrolled type 2 diabetes mellitus with hyperglycemia (HCC)  -     insulin glargine (LANTUS;BASAGLAR) 100 UNIT/ML injection pen; Inject 10 Units into the skin nightly, Disp-5 Adjustable Dose Pre-filled Pen Syringe, R-2Normal  Hemoglobin A1C   Date Value Ref Range Status   07/06/2024 11.0 (H) 4.0 - 5.6 % Final     Comment:     (NOTE)  HbA1C Interpretive Ranges  <5.7              Normal  5.7 - 6.4         Consider Prediabetes  >6.5              Consider Diabetes       Hemoglobin A1C, POC   Date Value Ref Range Status   01/23/2024 11.6 % Final   This is my goal of less than 8 however there is some slight improvement since last A1c in January 2024.  Suspect that A1c is quite uncontrolled because patient has not taken Lantus.  It is unclear why patient is not taking.   Blood sugars have been slightly lower than usual at 199-230 fasting.  Will start Lantus back at 10 units at bedtime since patient has reduced p.o. intake however I do suspect we will quickly need to increase units at night.  Continue NovoLog sliding scale as directed.  It appears he is actually supposed to be taking NovoLog 10 to 15 units with

## 2024-07-15 NOTE — PROGRESS NOTES
Chief Complaint   Patient presents with    Follow-Up from Hospital    hernia repair     Has F/U with surgeon next week      Follow up     \"Have you been to the ER, urgent care clinic since your last visit?  Hospitalized since your last visit?\"    NO    “Have you seen or consulted any other health care providers outside of [facility] since your last visit?”    NO        “Have you had a colorectal cancer screening such as a colonoscopy/FIT/Cologuard?    NO    No colonoscopy on file  No cologuard on file  No FIT/FOBT on file   No flexible sigmoidoscopy on file         Click Here for Release of Records Request

## 2024-08-05 PROBLEM — N39.0 UTI (URINARY TRACT INFECTION): Status: RESOLVED | Noted: 2024-07-06 | Resolved: 2024-08-05

## 2024-08-21 RX ORDER — ATORVASTATIN CALCIUM 40 MG/1
40 TABLET, FILM COATED ORAL DAILY
Qty: 90 TABLET | Refills: 1 | Status: SHIPPED | OUTPATIENT
Start: 2024-08-21

## 2024-08-21 RX ORDER — TAMSULOSIN HYDROCHLORIDE 0.4 MG/1
0.4 CAPSULE ORAL DAILY
Qty: 90 CAPSULE | Refills: 1 | Status: SHIPPED | OUTPATIENT
Start: 2024-08-21

## 2024-08-28 ENCOUNTER — OFFICE VISIT (OUTPATIENT)
Facility: CLINIC | Age: 54
End: 2024-08-28
Payer: COMMERCIAL

## 2024-08-28 VITALS
SYSTOLIC BLOOD PRESSURE: 110 MMHG | RESPIRATION RATE: 18 BRPM | WEIGHT: 235.25 LBS | TEMPERATURE: 98.9 F | BODY MASS INDEX: 31.18 KG/M2 | HEART RATE: 93 BPM | DIASTOLIC BLOOD PRESSURE: 72 MMHG | HEIGHT: 73 IN | OXYGEN SATURATION: 98 %

## 2024-08-28 DIAGNOSIS — R31.9 HEMATURIA, UNSPECIFIED TYPE: Primary | ICD-10-CM

## 2024-08-28 LAB
BILIRUBIN, URINE, POC: ABNORMAL
BLOOD URINE, POC: ABNORMAL
GLUCOSE URINE, POC: 100
KETONES, URINE, POC: NEGATIVE
LEUKOCYTE ESTERASE, URINE, POC: ABNORMAL
NITRITE, URINE, POC: POSITIVE
PH, URINE, POC: 6 (ref 4.6–8)
PROTEIN,URINE, POC: POSITIVE
SPECIFIC GRAVITY, URINE, POC: 1.02 (ref 1–1.03)
URINALYSIS CLARITY, POC: ABNORMAL
URINALYSIS COLOR, POC: ABNORMAL
UROBILINOGEN, POC: NORMAL

## 2024-08-28 PROCEDURE — 99213 OFFICE O/P EST LOW 20 MIN: CPT

## 2024-08-28 PROCEDURE — 81003 URINALYSIS AUTO W/O SCOPE: CPT

## 2024-08-28 PROCEDURE — 3074F SYST BP LT 130 MM HG: CPT

## 2024-08-28 PROCEDURE — 3078F DIAST BP <80 MM HG: CPT

## 2024-08-28 RX ORDER — CIPROFLOXACIN 500 MG/1
500 TABLET, FILM COATED ORAL DAILY
Qty: 7 TABLET | Refills: 0 | Status: SHIPPED | OUTPATIENT
Start: 2024-08-28 | End: 2024-09-04

## 2024-08-28 ASSESSMENT — PATIENT HEALTH QUESTIONNAIRE - PHQ9
1. LITTLE INTEREST OR PLEASURE IN DOING THINGS: NOT AT ALL
2. FEELING DOWN, DEPRESSED OR HOPELESS: NOT AT ALL
SUM OF ALL RESPONSES TO PHQ QUESTIONS 1-9: 0
SUM OF ALL RESPONSES TO PHQ9 QUESTIONS 1 & 2: 0
SUM OF ALL RESPONSES TO PHQ QUESTIONS 1-9: 0

## 2024-08-28 ASSESSMENT — ENCOUNTER SYMPTOMS
RESPIRATORY NEGATIVE: 1
GASTROINTESTINAL NEGATIVE: 1

## 2024-08-28 NOTE — PROGRESS NOTES
Miguel Marc is a 54 y.o. male who presents to the office today for the following:    Chief Complaint   Patient presents with    Hematuria       Past Medical History:   Diagnosis Date    Abdominal hernia 1/29/2020    Benign prostatic hyperplasia 3/16/2021    CAD (coronary artery disease)     Multiple PCIs:  4/22 RPDA CROW, 11/2021 tent thrombosis in LAD, lesion to the RPLB and  to the PDA,; 6/20 s/p CROW to mid/distal LCx    CKD (chronic kidney disease) stage 4, GFR 15-29 ml/min ([facility])     Congenital polycystic kidney 3/16/2021    Coronary arteriosclerosis 3/25/2020    Flu 01/2024    Hemodialysis patient ([facility])     [facility]    HFrEF (heart failure with reduced ejection fraction) ([facility])     <40%    History of COVID-19 3/28/2021    History of prostatitis     Hyperlipidemia     Hypertension     ONIEL (iron deficiency anemia)     Inguinal hernia     Ischemic cardiomyopathy 12/2023    Dilated, hypertrophied left ventricle with severely reduced systolic function. LV ejection fraction = 20-25%    Morbid obesity ([facility])     NSTEMI (non-ST elevated myocardial infarction) ([facility])     6/2020, 3/2020    Polycystic kidney disease     Spinal stenosis     T2DM (type 2 diabetes mellitus) ([facility])     insulin dependent, poorly controlled        Past Surgical History:   Procedure Laterality Date    HERNIA REPAIR  1999    umbiblical hernia    HERNIA REPAIR Bilateral 7/7/2024    OPEN BILATERAL INGUINAL HERNIA REPAIR WITH MESH performed by Santino Dougherty MD at [facility] MAIN OR    RI UNLISTED PROCEDURE CARDIAC SURGERY  06/30/2020    Stent placement    RI UNLISTED PROCEDURE CARDIAC SURGERY  04/08/2022    stent placement- has total 4        Family History   Problem Relation Age of Onset    Kidney Disease Brother     No Known Problems Sister     Hypertension Mother     Diabetes Mother     Kidney Disease Mother     Heart Disease Father     Hypertension Sister     Diabetes Brother         Social History     Tobacco Use    Smoking status: Never

## 2024-08-30 LAB — BACTERIA UR CULT: NORMAL

## 2024-10-04 ENCOUNTER — OFFICE VISIT (OUTPATIENT)
Facility: CLINIC | Age: 54
End: 2024-10-04
Payer: COMMERCIAL

## 2024-10-04 VITALS
HEIGHT: 73 IN | DIASTOLIC BLOOD PRESSURE: 71 MMHG | OXYGEN SATURATION: 98 % | HEART RATE: 96 BPM | RESPIRATION RATE: 18 BRPM | BODY MASS INDEX: 30.48 KG/M2 | WEIGHT: 230 LBS | SYSTOLIC BLOOD PRESSURE: 109 MMHG | TEMPERATURE: 98.1 F

## 2024-10-04 DIAGNOSIS — I25.10 CAD, MULTIPLE VESSEL: ICD-10-CM

## 2024-10-04 DIAGNOSIS — I50.22 CHRONIC SYSTOLIC (CONGESTIVE) HEART FAILURE (HCC): Primary | ICD-10-CM

## 2024-10-04 DIAGNOSIS — E78.2 MIXED HYPERLIPIDEMIA: ICD-10-CM

## 2024-10-04 DIAGNOSIS — Z87.19 S/P BILATERAL INGUINAL HERNIA REPAIR: ICD-10-CM

## 2024-10-04 DIAGNOSIS — M48.00 SPINAL STENOSIS, UNSPECIFIED SPINAL REGION: ICD-10-CM

## 2024-10-04 DIAGNOSIS — I10 ESSENTIAL (PRIMARY) HYPERTENSION: ICD-10-CM

## 2024-10-04 DIAGNOSIS — Z91.199 NONCOMPLIANCE WITH DIABETES TREATMENT: ICD-10-CM

## 2024-10-04 DIAGNOSIS — Z99.2 CKD (CHRONIC KIDNEY DISEASE) REQUIRING CHRONIC DIALYSIS (HCC): ICD-10-CM

## 2024-10-04 DIAGNOSIS — E11.65 UNCONTROLLED TYPE 2 DIABETES MELLITUS WITH HYPERGLYCEMIA (HCC): ICD-10-CM

## 2024-10-04 DIAGNOSIS — N40.0 BENIGN PROSTATIC HYPERPLASIA WITHOUT LOWER URINARY TRACT SYMPTOMS: ICD-10-CM

## 2024-10-04 DIAGNOSIS — Z98.890 S/P BILATERAL INGUINAL HERNIA REPAIR: ICD-10-CM

## 2024-10-04 DIAGNOSIS — E66.9 OBESITY (BMI 30-39.9): ICD-10-CM

## 2024-10-04 DIAGNOSIS — N18.6 CKD (CHRONIC KIDNEY DISEASE) REQUIRING CHRONIC DIALYSIS (HCC): ICD-10-CM

## 2024-10-04 DIAGNOSIS — D64.9 ANEMIA, UNSPECIFIED TYPE: ICD-10-CM

## 2024-10-04 LAB — HBA1C MFR BLD: 8.1 %

## 2024-10-04 PROCEDURE — 3046F HEMOGLOBIN A1C LEVEL >9.0%: CPT | Performed by: NURSE PRACTITIONER

## 2024-10-04 PROCEDURE — 83036 HEMOGLOBIN GLYCOSYLATED A1C: CPT | Performed by: NURSE PRACTITIONER

## 2024-10-04 PROCEDURE — 99214 OFFICE O/P EST MOD 30 MIN: CPT | Performed by: NURSE PRACTITIONER

## 2024-10-04 PROCEDURE — 3074F SYST BP LT 130 MM HG: CPT | Performed by: NURSE PRACTITIONER

## 2024-10-04 PROCEDURE — 3078F DIAST BP <80 MM HG: CPT | Performed by: NURSE PRACTITIONER

## 2024-10-04 RX ORDER — FUROSEMIDE 40 MG/1
80 TABLET ORAL 3 TIMES DAILY
COMMUNITY
Start: 2024-09-24

## 2024-10-04 NOTE — PROGRESS NOTES
Chief Complaint   Patient presents with    Diabetes     Follow up       Pt did not bring meds, went over list, pt confirmed       \"Have you been to the ER, urgent care clinic since your last visit?  Hospitalized since your last visit?\"    NO    “Have you seen or consulted any other health care providers outside our system since your last visit?”    NO      “Have you had a colorectal cancer screening such as a colonoscopy/FIT/Cologuard?    NO    No colonoscopy on file  No cologuard on file  No FIT/FOBT on file   No flexible sigmoidoscopy on file     “Have you had a diabetic eye exam?”    NO last year [facility]    No diabetic eye exam on file              
management in [place].    7. Class 1 obesity due to excess calories with serious comorbidity and body mass index (BMI) of 31.0 to 31.9 in adult  Continue to encourage weight loss with diet and exercise as discussed above    8. Mixed hyperlipidemia  Lab Results   Component Value Date     (H) 01/23/2024    Continues on atorvastatin 40mg as directed (highest dose tolerated).    9. S/P bilateral inguinal hernia repair  Surgical repair successful in 7/2024.    10. CKD (chronic kidney disease) requiring chronic dialysis (HCC)  Lab Results   Component Value Date    CREATININE 7.98 (H) 07/09/2024    Is under care of nephrology who is managing.     11. Anemia  Lab Results   Component Value Date    WBC 16.5 (H) 07/09/2024    HGB 10.7 (L) 07/09/2024    HCT 32.0 (L) 07/09/2024    MCV 76.9 (L) 07/09/2024     (L) 07/09/2024   Is under care of nephrology who is managing.  Colon screening was declined and is aware of risks which could lead to poor outcomes or be life threatening.    12. Noncompliance with diabetes treatment  Re-iterated importance in strict management of diabetes with fingerstick checks, diet and insulin to prevent serious or life threatening effects from diabetes.       Patient/wife verbalizes understanding of plan of care as discussed above    Return in about 3 months (around 1/4/2025), or if symptoms worsen or fail to improve.

## 2024-10-28 PROBLEM — N18.4 CKD (CHRONIC KIDNEY DISEASE) STAGE 4, GFR 15-29 ML/MIN (HCC): Status: RESOLVED | Noted: 2023-02-06 | Resolved: 2024-10-28

## 2024-10-28 PROBLEM — Z98.890 S/P BILATERAL INGUINAL HERNIA REPAIR: Status: ACTIVE | Noted: 2024-10-28

## 2024-10-28 PROBLEM — Z91.199 NONCOMPLIANCE WITH DIABETES TREATMENT: Status: ACTIVE | Noted: 2023-02-06

## 2024-10-28 PROBLEM — Z87.19 S/P BILATERAL INGUINAL HERNIA REPAIR: Status: ACTIVE | Noted: 2024-10-28

## 2025-02-19 RX ORDER — TAMSULOSIN HYDROCHLORIDE 0.4 MG/1
0.4 CAPSULE ORAL DAILY
Qty: 90 CAPSULE | Refills: 1 | Status: SHIPPED | OUTPATIENT
Start: 2025-02-19

## 2025-02-26 RX ORDER — TAMSULOSIN HYDROCHLORIDE 0.4 MG/1
0.4 CAPSULE ORAL DAILY
Qty: 90 CAPSULE | Refills: 1 | Status: SHIPPED | OUTPATIENT
Start: 2025-02-26

## 2025-03-03 DIAGNOSIS — E78.2 MIXED HYPERLIPIDEMIA: Primary | ICD-10-CM

## 2025-03-03 RX ORDER — ATORVASTATIN CALCIUM 40 MG/1
40 TABLET, FILM COATED ORAL DAILY
Qty: 90 TABLET | Refills: 1 | Status: SHIPPED | OUTPATIENT
Start: 2025-03-03

## 2025-03-04 DIAGNOSIS — E11.65 UNCONTROLLED TYPE 2 DIABETES MELLITUS WITH HYPERGLYCEMIA (HCC): ICD-10-CM

## 2025-03-05 RX ORDER — ACYCLOVIR 400 MG/1
TABLET ORAL
Qty: 3 EACH | Refills: 5 | Status: SHIPPED | OUTPATIENT
Start: 2025-03-05

## 2025-03-28 ENCOUNTER — COMMUNITY OUTREACH (OUTPATIENT)
Facility: CLINIC | Age: 55
End: 2025-03-28

## 2025-04-04 ENCOUNTER — APPOINTMENT (OUTPATIENT)
Facility: HOSPITAL | Age: 55
End: 2025-04-04
Payer: COMMERCIAL

## 2025-04-04 ENCOUNTER — HOSPITAL ENCOUNTER (EMERGENCY)
Facility: HOSPITAL | Age: 55
Discharge: HOME | End: 2025-04-04
Attending: EMERGENCY MEDICINE
Payer: COMMERCIAL

## 2025-04-04 VITALS
DIASTOLIC BLOOD PRESSURE: 96 MMHG | HEIGHT: 73 IN | BODY MASS INDEX: 29.37 KG/M2 | HEART RATE: 82 BPM | TEMPERATURE: 98.3 F | WEIGHT: 221.6 LBS | OXYGEN SATURATION: 99 % | SYSTOLIC BLOOD PRESSURE: 127 MMHG | RESPIRATION RATE: 19 BRPM

## 2025-04-04 DIAGNOSIS — I25.9 CHEST PAIN DUE TO MYOCARDIAL ISCHEMIA, UNSPECIFIED ISCHEMIC CHEST PAIN TYPE: Primary | ICD-10-CM

## 2025-04-04 LAB
ALBUMIN SERPL-MCNC: 3.5 G/DL (ref 3.5–5)
ALBUMIN/GLOB SERPL: 0.8 (ref 1.1–2.2)
ALP SERPL-CCNC: 67 U/L (ref 45–117)
ALT SERPL-CCNC: 10 U/L (ref 12–78)
ANION GAP SERPL CALC-SCNC: 17 MMOL/L (ref 2–12)
AST SERPL W P-5'-P-CCNC: 8 U/L (ref 15–37)
BASOPHILS # BLD: 0.04 K/UL (ref 0–0.1)
BASOPHILS NFR BLD: 0.5 % (ref 0–1)
BILIRUB SERPL-MCNC: 0.5 MG/DL (ref 0.2–1)
BNP SERPL-MCNC: ABNORMAL PG/ML
BUN SERPL-MCNC: 76 MG/DL (ref 6–20)
BUN/CREAT SERPL: 9 (ref 12–20)
CA-I BLD-MCNC: 8.9 MG/DL (ref 8.5–10.1)
CHLORIDE SERPL-SCNC: 96 MMOL/L (ref 97–108)
CO2 SERPL-SCNC: 21 MMOL/L (ref 21–32)
CREAT SERPL-MCNC: 8.74 MG/DL (ref 0.7–1.3)
DIFFERENTIAL METHOD BLD: ABNORMAL
EKG ATRIAL RATE: 93 BPM
EKG DIAGNOSIS: NORMAL
EKG P AXIS: 48 DEGREES
EKG P-R INTERVAL: 216 MS
EKG Q-T INTERVAL: 384 MS
EKG QRS DURATION: 134 MS
EKG QTC CALCULATION (BAZETT): 478 MS
EKG R AXIS: -38 DEGREES
EKG T AXIS: 139 DEGREES
EKG VENTRICULAR RATE: 93 BPM
EOSINOPHIL # BLD: 0.22 K/UL (ref 0–0.4)
EOSINOPHIL NFR BLD: 2.8 % (ref 0–7)
ERYTHROCYTE [DISTWIDTH] IN BLOOD BY AUTOMATED COUNT: 14.2 % (ref 11.5–14.5)
GLOBULIN SER CALC-MCNC: 4.6 G/DL (ref 2–4)
GLUCOSE SERPL-MCNC: 314 MG/DL (ref 65–100)
HCT VFR BLD AUTO: 40 % (ref 36.6–50.3)
HGB BLD-MCNC: 13.2 G/DL (ref 12.1–17)
IMM GRANULOCYTES # BLD AUTO: 0.01 K/UL (ref 0–0.04)
IMM GRANULOCYTES NFR BLD AUTO: 0.1 % (ref 0–0.5)
LYMPHOCYTES # BLD: 0.98 K/UL (ref 0.8–3.5)
LYMPHOCYTES NFR BLD: 12.5 % (ref 12–49)
MCH RBC QN AUTO: 27.4 PG (ref 26–34)
MCHC RBC AUTO-ENTMCNC: 33 G/DL (ref 30–36.5)
MCV RBC AUTO: 83 FL (ref 80–99)
MONOCYTES # BLD: 0.99 K/UL (ref 0–1)
MONOCYTES NFR BLD: 12.7 % (ref 5–13)
NEUTS SEG # BLD: 5.58 K/UL (ref 1.8–8)
NEUTS SEG NFR BLD: 71.4 % (ref 32–75)
NRBC # BLD: 0 K/UL (ref 0–0.01)
NRBC BLD-RTO: 0 PER 100 WBC
PLATELET # BLD AUTO: 92 K/UL (ref 150–400)
PMV BLD AUTO: 11.3 FL (ref 8.9–12.9)
POTASSIUM SERPL-SCNC: 4 MMOL/L (ref 3.5–5.1)
PROT SERPL-MCNC: 8.1 G/DL (ref 6.4–8.2)
RBC # BLD AUTO: 4.82 M/UL (ref 4.1–5.7)
SODIUM SERPL-SCNC: 134 MMOL/L (ref 136–145)
TROPONIN I SERPL HS-MCNC: 34 NG/L (ref 0–76)
TROPONIN I SERPL HS-MCNC: 36 NG/L (ref 0–76)
WBC # BLD AUTO: 7.8 K/UL (ref 4.1–11.1)

## 2025-04-04 PROCEDURE — 93005 ELECTROCARDIOGRAM TRACING: CPT | Performed by: EMERGENCY MEDICINE

## 2025-04-04 PROCEDURE — 83880 ASSAY OF NATRIURETIC PEPTIDE: CPT

## 2025-04-04 PROCEDURE — 99285 EMERGENCY DEPT VISIT HI MDM: CPT

## 2025-04-04 PROCEDURE — 36415 COLL VENOUS BLD VENIPUNCTURE: CPT

## 2025-04-04 PROCEDURE — 85025 COMPLETE CBC W/AUTO DIFF WBC: CPT

## 2025-04-04 PROCEDURE — 71045 X-RAY EXAM CHEST 1 VIEW: CPT

## 2025-04-04 PROCEDURE — 80053 COMPREHEN METABOLIC PANEL: CPT

## 2025-04-04 PROCEDURE — 6370000000 HC RX 637 (ALT 250 FOR IP): Performed by: EMERGENCY MEDICINE

## 2025-04-04 PROCEDURE — 84484 ASSAY OF TROPONIN QUANT: CPT

## 2025-04-04 RX ORDER — ASPIRIN 81 MG/1
324 TABLET, CHEWABLE ORAL
Status: COMPLETED | OUTPATIENT
Start: 2025-04-04 | End: 2025-04-04

## 2025-04-04 RX ADMIN — ASPIRIN 324 MG: 81 TABLET, CHEWABLE ORAL at 02:54

## 2025-04-04 ASSESSMENT — LIFESTYLE VARIABLES
HOW MANY STANDARD DRINKS CONTAINING ALCOHOL DO YOU HAVE ON A TYPICAL DAY: PATIENT DOES NOT DRINK
HOW OFTEN DO YOU HAVE A DRINK CONTAINING ALCOHOL: NEVER

## 2025-04-04 ASSESSMENT — HEART SCORE
ECG: NON-SPECIFC REPOLARIZATION DISTURBANCE/LBTB/PM
ECG: NON-SPECIFC REPOLARIZATION DISTURBANCE/LBTB/PM

## 2025-04-04 NOTE — ED NOTES
Patient ambulated from bathroom back to room with no difficulty   Patient was provided with a warm blanket, no further requested at this time.

## 2025-04-04 NOTE — ED TRIAGE NOTES
Pt arrives to ED with family. Pt reports CP that started at 0130, took 2 nitro and experienced some relief. Reports hx of MI w/stents. HD patient.

## 2025-04-04 NOTE — ED NOTES
Patient refused hospital admission due to not wanting to switch cardiologist from [facility] to [facility]. Patient signed AMA form. Patient stated he is communicating with [facility] now and will follow-up with them.

## 2025-04-04 NOTE — ED PROVIDER NOTES
[facility] EMERGENCY DEPARTMENT  EMERGENCY DEPARTMENT ENCOUNTER       Pt Name: Miguel Marc  MRN: [id number]  Birthdate 1970  Date of evaluation: 4/4/2025  Provider: Genseis Servin MD   PCP: Ileana Anne APRN - NP  Note Started: 3:26 AM 4/4/25     CHIEF COMPLAINT       Chief Complaint   Patient presents with    Chest Pain        HISTORY OF PRESENT ILLNESS: 1 or more elements      History From: Patient  None     Miguel Marc is a 54 y.o. male who presents woke up with chest pain at 1:30 pm. Patient took NTG.  Chest  scale was 10/10 initially.  After taking the NTG it was 0.  He denies radiation of pain, SOB, diaphoresis, nausea, vomiting.  Patient is s/p 2 non-stemi, with stent placement.  He is a dialysis patient.  Patient denies any recent stressful event  Patient's cardiologist is at [facility].  Nursing Notes were all reviewed and agreed with or any disagreements were addressed in the HPI.     REVIEW OF SYSTEMS        Positives and Pertinent negatives as per HPI.    PAST HISTORY     Past Medical History:  Past Medical History:   Diagnosis Date    Abdominal hernia 1/29/2020    Anemia 02/06/2023    Benign prostatic hyperplasia 3/16/2021    CAD (coronary artery disease)     Multiple PCIs:  4/22 RPDA CROW, 11/2021 tent thrombosis in LAD, lesion to the RPLB and  to the PDA,; 6/20 s/p CROW to mid/distal LCx    CKD (chronic kidney disease) stage 4, GFR 15-29 ml/min ([facility])     Colon cancer screening declined 08/21/2022    Congenital polycystic kidney 3/16/2021    Coronary arteriosclerosis 3/25/2020    Flu 01/2024    Hemodialysis patient     [facility]    HFrEF (heart failure with reduced ejection fraction) ([facility])     <40%    History of COVID-19 3/28/2021    History of prostatitis     Hyperlipidemia     Hyperlipidemia, unspecified 11/16/2021    Hypertension     ONIEL (iron deficiency anemia)     Inguinal hernia     Ischemic cardiomyopathy 12/2023    Dilated, hypertrophied left  nstructions to follow up with Ileana Anne APRN - NP tomorrow or return to the Emergency Room as soon as possible. This discussion was witnessed by nurse Eagle.               Chronic Conditions: Atherosclerotic heart disease status post non-STEMI with stent placement, end-stage renal disease     Social Determinants affecting Dx or Tx: None    Records Reviewed (source and summary of external notes): Nursing Notes    CC/HPI Summary, DDx, ED Course, and Reassessment:   Ddx: Congestive heart failure, pneumonia, GERD, peptic ulcer disease, electrolyte imbalance, STEMI non-STEMI,       Disposition Considerations (Tests not done, Shared Decision Making, Pt Expectation of Test or Tx.):      FINAL IMPRESSION   No diagnosis found.      DISPOSITION/PLAN   DISPOSITION                 AMA     PATIENT REFERRED TO:  No follow-ups on file.        DISCHARGE MEDICATIONS:     Medication List        CONTINUE taking these medications      Dexcom G7 Sensor Misc  USE TO CHECK BLOOD SUGAR 4 TIMES DAILY     Insulin Pen Needle 32G X 4 MM Misc  Use to inject insulin 4 times a day            ASK your doctor about these medications      albuterol sulfate  (90 Base) MCG/ACT inhaler  Commonly known as: Ventolin HFA  Inhale 2 puffs into the lungs 4 times daily as needed for Wheezing     aspirin 81 MG EC tablet     atorvastatin 40 MG tablet  Commonly known as: LIPITOR  Take 1 tablet by mouth daily     carvedilol 25 MG tablet  Commonly known as: COREG  Take 1 tablet by mouth 2 times daily     docusate 100 MG Caps  Commonly known as: COLACE, DULCOLAX  Take 100 mg by mouth 2 times daily     ezetimibe 10 MG tablet  Commonly known as: ZETIA  TAKE 1 TABLET BY MOUTH DAILY     furosemide 40 MG tablet  Commonly known as: LASIX     hydrALAZINE 50 MG tablet  Commonly known as: APRESOLINE  Take 1 tablet by mouth 3 times daily     insulin glargine 100 UNIT/ML injection pen  Commonly known as: LANTUS;BASAGLAR  Inject 10 Units into the skin

## 2025-04-04 NOTE — DISCHARGE INSTRUCTIONS
Continue current medication as directed.  Follow-up with cardiology today.  If symptoms get worse return to the ED immediately      Thank you for choosing our Emergency Department for your care.  It is our privilege to care for you in your time of need.  In the next several days, you may receive a survey via email or mailed to your home about your experience with our team.  We would greatly appreciate you taking a few minutes to complete the survey, as we use this information to learn what we have done well and what we could be doing better. Thank you for trusting us with your care!    Below you will find a list of your tests from today's visit.   Labs and Radiology Studies  Recent Results (from the past 12 hours)   EKG 12 Lead    Collection Time: 04/04/25  2:31 AM   Result Value Ref Range    Ventricular Rate 93 BPM    Atrial Rate 93 BPM    P-R Interval 216 ms    QRS Duration 134 ms    Q-T Interval 384 ms    QTc Calculation (Bazett) 478 ms    P Axis 48 degrees    R Axis -38 degrees    T Axis 139 degrees    Diagnosis       Sinus rhythm  Ventricular premature complex  Prolonged CA interval  Probable left atrial enlargement  Nonspecific IVCD with LAD  LVH with secondary repolarization abnormality     CBC with Auto Differential    Collection Time: 04/04/25  2:38 AM   Result Value Ref Range    WBC 7.8 4.1 - 11.1 K/uL    RBC 4.82 4.10 - 5.70 M/uL    Hemoglobin 13.2 12.1 - 17.0 g/dL    Hematocrit 40.0 36.6 - 50.3 %    MCV 83.0 80.0 - 99.0 FL    MCH 27.4 26.0 - 34.0 PG    MCHC 33.0 30.0 - 36.5 g/dL    RDW 14.2 11.5 - 14.5 %    Platelets 92 (L) 150 - 400 K/uL    MPV 11.3 8.9 - 12.9 FL    Nucleated RBCs 0.0 0.0  WBC    nRBC 0.00 0.00 - 0.01 K/uL    Neutrophils % 71.4 32.0 - 75.0 %    Lymphocytes % 12.5 12.0 - 49.0 %    Monocytes % 12.7 5.0 - 13.0 %    Eosinophils % 2.8 0.0 - 7.0 %    Basophils % 0.5 0.0 - 1.0 %    Immature Granulocytes % 0.1 0 - 0.5 %    Neutrophils Absolute 5.58 1.80 - 8.00 K/UL    Lymphocytes  Absolute 0.98 0.80 - 3.50 K/UL    Monocytes Absolute 0.99 0.00 - 1.00 K/UL    Eosinophils Absolute 0.22 0.00 - 0.40 K/UL    Basophils Absolute 0.04 0.00 - 0.10 K/UL    Immature Granulocytes Absolute 0.01 0.00 - 0.04 K/UL    Differential Type AUTOMATED     Troponin    Collection Time: 04/04/25  2:38 AM   Result Value Ref Range    Troponin, High Sensitivity 36 0 - 76 ng/L   Comprehensive Metabolic Panel    Collection Time: 04/04/25  2:38 AM   Result Value Ref Range    Sodium 134 (L) 136 - 145 mmol/L    Potassium 4.0 3.5 - 5.1 mmol/L    Chloride 96 (L) 97 - 108 mmol/L    CO2 21 21 - 32 mmol/L    Anion Gap 17 (H) 2 - 12 mmol/L    Glucose 314 (H) 65 - 100 mg/dL    BUN 76 (H) 6 - 20 mg/dL    Creatinine 8.74 (H) 0.70 - 1.30 mg/dL    BUN/Creatinine Ratio 9 (L) 12 - 20      Est, Glom Filt Rate 7 (L) >60 ml/min/1.73m2    Calcium 8.9 8.5 - 10.1 mg/dL    Total Bilirubin 0.5 0.2 - 1.0 mg/dL    AST 8 (L) 15 - 37 U/L    ALT 10 (L) 12 - 78 U/L    Alk Phosphatase 67 45 - 117 U/L    Total Protein 8.1 6.4 - 8.2 g/dL    Albumin 3.5 3.5 - 5.0 g/dL    Globulin 4.6 (H) 2.0 - 4.0 g/dL    Albumin/Globulin Ratio 0.8 (L) 1.1 - 2.2     Brain Natriuretic Peptide    Collection Time: 04/04/25  2:38 AM   Result Value Ref Range    NT Pro-BNP 15,559 (H) <125 pg/mL   Troponin    Collection Time: 04/04/25  4:35 AM   Result Value Ref Range    Troponin, High Sensitivity 34 0 - 76 ng/L     XR CHEST PORTABLE  Result Date: 4/4/2025  EXAM:  XR CHEST PORTABLE INDICATION: Chest pain COMPARISON: 7/7/2024 TECHNIQUE: Portable AP chest view at 0259 hours FINDINGS: A right IJ catheter is stable. The cardiac silhouette is within normal limits. The pulmonary vasculature is within normal limits. The lungs and pleural spaces are clear. There is no pneumothorax. The visualized bones and upper abdomen are age-appropriate.     No acute process. Electronically signed by Boom FISHER

## 2025-05-02 ENCOUNTER — OFFICE VISIT (OUTPATIENT)
Facility: CLINIC | Age: 55
End: 2025-05-02
Payer: COMMERCIAL

## 2025-05-02 VITALS
HEART RATE: 94 BPM | RESPIRATION RATE: 20 BRPM | OXYGEN SATURATION: 97 % | BODY MASS INDEX: 31.81 KG/M2 | WEIGHT: 240 LBS | TEMPERATURE: 97.7 F | SYSTOLIC BLOOD PRESSURE: 109 MMHG | HEIGHT: 73 IN | DIASTOLIC BLOOD PRESSURE: 71 MMHG

## 2025-05-02 DIAGNOSIS — I50.22 CHRONIC SYSTOLIC (CONGESTIVE) HEART FAILURE (HCC): ICD-10-CM

## 2025-05-02 DIAGNOSIS — Z99.2 CKD (CHRONIC KIDNEY DISEASE) REQUIRING CHRONIC DIALYSIS (HCC): ICD-10-CM

## 2025-05-02 DIAGNOSIS — E66.9 OBESITY (BMI 30-39.9): ICD-10-CM

## 2025-05-02 DIAGNOSIS — E11.65 UNCONTROLLED TYPE 2 DIABETES MELLITUS WITH HYPERGLYCEMIA (HCC): Primary | ICD-10-CM

## 2025-05-02 DIAGNOSIS — I25.10 CAD, MULTIPLE VESSEL: ICD-10-CM

## 2025-05-02 DIAGNOSIS — Z91.199 NONCOMPLIANCE WITH DIABETES TREATMENT: ICD-10-CM

## 2025-05-02 DIAGNOSIS — N40.0 BENIGN PROSTATIC HYPERPLASIA WITHOUT LOWER URINARY TRACT SYMPTOMS: ICD-10-CM

## 2025-05-02 DIAGNOSIS — Z98.890 S/P BILATERAL INGUINAL HERNIA REPAIR: ICD-10-CM

## 2025-05-02 DIAGNOSIS — M48.00 SPINAL STENOSIS, UNSPECIFIED SPINAL REGION: ICD-10-CM

## 2025-05-02 DIAGNOSIS — I10 ESSENTIAL (PRIMARY) HYPERTENSION: ICD-10-CM

## 2025-05-02 DIAGNOSIS — D64.9 ANEMIA, UNSPECIFIED TYPE: ICD-10-CM

## 2025-05-02 DIAGNOSIS — N18.6 CKD (CHRONIC KIDNEY DISEASE) REQUIRING CHRONIC DIALYSIS (HCC): ICD-10-CM

## 2025-05-02 DIAGNOSIS — E78.2 MIXED HYPERLIPIDEMIA: ICD-10-CM

## 2025-05-02 DIAGNOSIS — Z87.19 S/P BILATERAL INGUINAL HERNIA REPAIR: ICD-10-CM

## 2025-05-02 LAB — HBA1C MFR BLD: 12.9 %

## 2025-05-02 PROCEDURE — 3074F SYST BP LT 130 MM HG: CPT | Performed by: NURSE PRACTITIONER

## 2025-05-02 PROCEDURE — 99214 OFFICE O/P EST MOD 30 MIN: CPT | Performed by: NURSE PRACTITIONER

## 2025-05-02 PROCEDURE — 83036 HEMOGLOBIN GLYCOSYLATED A1C: CPT | Performed by: NURSE PRACTITIONER

## 2025-05-02 PROCEDURE — 3078F DIAST BP <80 MM HG: CPT | Performed by: NURSE PRACTITIONER

## 2025-05-02 PROCEDURE — 3046F HEMOGLOBIN A1C LEVEL >9.0%: CPT | Performed by: NURSE PRACTITIONER

## 2025-05-02 RX ORDER — ERGOCALCIFEROL 1.25 MG/1
CAPSULE, LIQUID FILLED ORAL
COMMUNITY
Start: 2024-10-18

## 2025-05-02 RX ORDER — CARVEDILOL 25 MG/1
37.5 TABLET ORAL 2 TIMES DAILY
Qty: 60 TABLET | Refills: 0 | Status: SHIPPED
Start: 2025-05-02

## 2025-05-02 RX ORDER — CALCITRIOL 0.5 UG/1
CAPSULE, LIQUID FILLED ORAL DAILY
COMMUNITY
Start: 2025-04-16

## 2025-05-02 SDOH — ECONOMIC STABILITY: FOOD INSECURITY: WITHIN THE PAST 12 MONTHS, THE FOOD YOU BOUGHT JUST DIDN'T LAST AND YOU DIDN'T HAVE MONEY TO GET MORE.: NEVER TRUE

## 2025-05-02 SDOH — ECONOMIC STABILITY: FOOD INSECURITY: WITHIN THE PAST 12 MONTHS, YOU WORRIED THAT YOUR FOOD WOULD RUN OUT BEFORE YOU GOT MONEY TO BUY MORE.: NEVER TRUE

## 2025-05-02 ASSESSMENT — PATIENT HEALTH QUESTIONNAIRE - PHQ9
SUM OF ALL RESPONSES TO PHQ QUESTIONS 1-9: 0
1. LITTLE INTEREST OR PLEASURE IN DOING THINGS: NOT AT ALL
SUM OF ALL RESPONSES TO PHQ QUESTIONS 1-9: 0
2. FEELING DOWN, DEPRESSED OR HOPELESS: NOT AT ALL

## 2025-05-02 NOTE — PROGRESS NOTES
Miguel Marc is a 52 y.o. male who presents to the office today for the following:    Chief Complaint   Patient presents with    Diabetes        Past Medical History:   Diagnosis Date    Abdominal hernia 1/29/2020    Anemia 02/06/2023    Benign prostatic hyperplasia 3/16/2021    CAD (coronary artery disease)     Multiple PCIs:  4/22 RPDA CROW, 11/2021 tent thrombosis in LAD, lesion to the RPLB and  to the PDA,; 6/20 s/p CROW to mid/distal LCx    CKD (chronic kidney disease) stage 4, GFR 15-29 ml/min ([facility])     Colon cancer screening declined 08/21/2022    Congenital polycystic kidney 3/16/2021    Coronary arteriosclerosis 3/25/2020    Flu 01/2024    Hemodialysis patient     [facility]    HFrEF (heart failure with reduced ejection fraction) ([facility])     <40%    History of COVID-19 3/28/2021    History of prostatitis     Hyperlipidemia     Hyperlipidemia, unspecified 11/16/2021    Hypertension     ONIEL (iron deficiency anemia)     Inguinal hernia     Ischemic cardiomyopathy 12/2023    Dilated, hypertrophied left ventricle with severely reduced systolic function. LV ejection fraction = 20-25%    Left inguinal hernia     Morbid obesity ([facility])     NSTEMI (non-ST elevated myocardial infarction) ([facility])     6/2020, 3/2020    Obesity 03/16/2021    Polycystic kidney disease     S/P bilateral inguinal hernia repair 10/28/2024    Spinal stenosis     T2DM (type 2 diabetes mellitus) ([facility])     insulin dependent, poorly controlled        Past Surgical History:   Procedure Laterality Date    HERNIA REPAIR  1999    umbiblical hernia    HERNIA REPAIR Bilateral 7/7/2024    OPEN BILATERAL INGUINAL HERNIA REPAIR WITH MESH performed by Santino Dougherty MD at [facility] MAIN OR    AZ UNLISTED PROCEDURE CARDIAC SURGERY  06/30/2020    Stent placement    AZ UNLISTED PROCEDURE CARDIAC SURGERY  04/08/2022    stent placement- has total 4        Family History   Problem Relation Age of Onset    Kidney Disease Brother     No Known Problems Sister

## 2025-05-02 NOTE — PROGRESS NOTES
Chief Complaint   Patient presents with    Diabetes     Follow up     1. Have you been to the ER, urgent care clinic since your last visit?  Hospitalized since your last visit?No    2. Have you seen or consulted any other health care providers outside of the [facility] since your last visit?  Include any pap smears or colon screening. No

## 2025-05-27 ENCOUNTER — HOSPITAL ENCOUNTER (OUTPATIENT)
Facility: HOSPITAL | Age: 55
Discharge: HOME | End: 2025-05-29
Payer: COMMERCIAL

## 2025-05-27 ENCOUNTER — PATIENT MESSAGE (OUTPATIENT)
Facility: CLINIC | Age: 55
End: 2025-05-27

## 2025-05-27 ENCOUNTER — RESULTS FOLLOW-UP (OUTPATIENT)
Facility: CLINIC | Age: 55
End: 2025-05-27

## 2025-05-27 DIAGNOSIS — M79.662 PAIN OF LEFT CALF: Primary | ICD-10-CM

## 2025-05-27 PROCEDURE — 93971 EXTREMITY STUDY: CPT

## 2025-05-30 ENCOUNTER — OFFICE VISIT (OUTPATIENT)
Facility: CLINIC | Age: 55
End: 2025-05-30
Payer: COMMERCIAL

## 2025-05-30 VITALS
OXYGEN SATURATION: 98 % | DIASTOLIC BLOOD PRESSURE: 87 MMHG | HEART RATE: 71 BPM | SYSTOLIC BLOOD PRESSURE: 127 MMHG | TEMPERATURE: 98.4 F

## 2025-05-30 DIAGNOSIS — S86.812A STRAIN OF CALF MUSCLE, LEFT, INITIAL ENCOUNTER: Primary | ICD-10-CM

## 2025-05-30 PROCEDURE — 3074F SYST BP LT 130 MM HG: CPT | Performed by: NURSE PRACTITIONER

## 2025-05-30 PROCEDURE — 99213 OFFICE O/P EST LOW 20 MIN: CPT | Performed by: NURSE PRACTITIONER

## 2025-05-30 PROCEDURE — 3079F DIAST BP 80-89 MM HG: CPT | Performed by: NURSE PRACTITIONER

## 2025-05-30 RX ORDER — CYCLOBENZAPRINE HCL 10 MG
10 TABLET ORAL 2 TIMES DAILY PRN
Qty: 30 TABLET | Refills: 0 | Status: SHIPPED | OUTPATIENT
Start: 2025-05-30 | End: 2025-06-09

## 2025-05-30 RX ORDER — PREDNISONE 10 MG/1
10 TABLET ORAL DAILY
Qty: 5 TABLET | Refills: 0 | Status: SHIPPED | OUTPATIENT
Start: 2025-05-30 | End: 2025-06-04

## 2025-05-30 ASSESSMENT — ENCOUNTER SYMPTOMS
GASTROINTESTINAL NEGATIVE: 1
RESPIRATORY NEGATIVE: 1

## 2025-05-30 NOTE — PROGRESS NOTES
tape causes  large blisters  Reaction Type: Allergy; Severity: Moderate; Reaction(s): paper tape = skin blisters      Spironolactone Swelling     gynecomastia       Review of Systems   Constitutional:  Negative for activity change, appetite change, chills, diaphoresis, fatigue, fever and unexpected weight change.   Respiratory: Negative.     Cardiovascular: Negative.    Gastrointestinal: Negative.    Musculoskeletal:  Positive for arthralgias.   Neurological:  Negative for numbness.              Objective    Vitals:    05/30/25 1643   BP: 127/87   Pulse: 71   Temp: 98.4 °F (36.9 °C)   SpO2: 98%       Physical Exam  Vitals and nursing note reviewed.   Constitutional:       Appearance: Normal appearance. He is obese.   Cardiovascular:      Rate and Rhythm: Normal rate.   Pulmonary:      Effort: Pulmonary effort is normal.   Abdominal:      General: Bowel sounds are normal.      Palpations: Abdomen is soft.   Musculoskeletal:      Right lower leg: No swelling, deformity, lacerations or tenderness.      Left lower leg: Tenderness present. No swelling, deformity or lacerations.        Legs:    Skin:     General: Skin is warm and dry.   Neurological:      Mental Status: He is alert and oriented to person, place, and time. Mental status is at baseline.       Interpretation Summary 5/27/25  Show Result Comparison     No evidence of deep vein thrombosis in the left lower extremity.           Assessment & Plan  1. Strain of calf muscle, left, initial encounter  -     predniSONE (DELTASONE) 10 MG tablet; Take 1 tablet by mouth daily for 5 days, Disp-5 tablet, R-0Normal  -     cyclobenzaprine (FLEXERIL) 10 MG tablet; Take 1 tablet by mouth 2 times daily as needed for Muscle spasms, Disp-30 tablet, R-0Normal     Start on low dose of prednisone as directed. Advised may temporarily raise glucose and monitor closely.  Also start on muscle relaxant prn (avoid driving/operating machinery when taking due to possible

## (undated) DEVICE — SUTURE MONOCRYL SZ 4-0 L27IN ABSRB UD L19MM PS-2 1/2 CIR PRIM Y426H

## (undated) DEVICE — SUTURE DEV SZ 2-0 WND CLSR ABSRB GS-22 VLOC COVIDIEN VLOCM2145

## (undated) DEVICE — SURGICAL PROCEDURE TRAY CRD CATH 3 PRT

## (undated) DEVICE — BOWL UTIL 16OZ STRL --

## (undated) DEVICE — CATH 5F 100CM JL35 -- DXTERITY

## (undated) DEVICE — SOLUTION IRRIG 1000ML STRL H2O USP PLAS POUR BTL

## (undated) DEVICE — COPE MANDRIL WIRE GUIDE STAINLESS STEEL: Brand: COPE

## (undated) DEVICE — BAND COMPR L29CM XLN CLR PLAS HEMSTAT EXT HK AND LOOP RETEN

## (undated) DEVICE — Z DUPLICATE USE 2103554 VALVE HEMOSTATIC BLEEDBK CTRL COPILOT

## (undated) DEVICE — CATH BLLN DIL 2.5 X15MM RX -- EUPHORA

## (undated) DEVICE — LIQUIBAND RAPID ADHESIVE 36/CS 0.8ML: Brand: MEDLINE

## (undated) DEVICE — GLOVE ORTHO 8   MSG9480

## (undated) DEVICE — TOOL INSRT ANGI GDWIRE MTL SS --

## (undated) DEVICE — RUNTHROUGH NS EXTRA FLOPPY PTCA GUIDEWIRE: Brand: RUNTHROUGH

## (undated) DEVICE — GUIDEWIRE VASC L260CM DIA0.035IN TIP L3MM STD EXCHG PTFE J

## (undated) DEVICE — PRESSURE TUBING: Brand: TRUWAVE

## (undated) DEVICE — SEAL

## (undated) DEVICE — SYRINGE MED 10ML PUR GAM COMPATIBLE POLYCARB FIX M LUER CONN

## (undated) DEVICE — DEVICE INFL SYR BLLN ENDO 30 -- INTELLI

## (undated) DEVICE — 3M™ STERI-DRAPE™ SMALL DRAPE WITH ADHESIVE APERTURE 1092 25/BX,4/CS&#X20;: Brand: STERI-DRAPE™

## (undated) DEVICE — CATH GUID COR AL75 6FR 100CM -- LAUNCHER

## (undated) DEVICE — INTRODUCER SHTH 6FR L4CM SHT GRN HUB W/O GWIRE FOR DECLOT

## (undated) DEVICE — WASTEBAG DRIP/ADAPTER: Brand: MEDLINE INDUSTRIES, INC.

## (undated) DEVICE — SYRINGE MED 10ML RED POLYCARB BRL FIX M LUER CONN FLAT GRP

## (undated) DEVICE — SYRINGE ANGIO 20ML WHT POLYCARB VAC PRSS CAP PLUNG FIX M

## (undated) DEVICE — SUTURE ETHIBOND EXCEL SZ 0 L36IN NONABSORBABLE GRN SH L26MM X524H

## (undated) DEVICE — 3M™ TEGADERM™ TRANSPARENT FILM DRESSING FRAME STYLE, 1626W, 4 IN X 4-3/4 IN (10 CM X 12 CM), 50/CT 4CT/CASE: Brand: 3M™ TEGADERM™

## (undated) DEVICE — DEVICE TORQ 0009 0018IN GRN PTFE GWIRE ANGIO COAT PIN VISE

## (undated) DEVICE — CATH 5F 100CM JR40 -- DXTERITY

## (undated) DEVICE — SOLUTION IRRIG 500ML 0.9% SOD CHLO USP POUR PLAS BTL